# Patient Record
Sex: FEMALE | Race: WHITE | NOT HISPANIC OR LATINO | Employment: OTHER | URBAN - METROPOLITAN AREA
[De-identification: names, ages, dates, MRNs, and addresses within clinical notes are randomized per-mention and may not be internally consistent; named-entity substitution may affect disease eponyms.]

---

## 2017-01-03 ENCOUNTER — TELEPHONE (OUTPATIENT)
Dept: FAMILY MEDICINE | Facility: CLINIC | Age: 62
End: 2017-01-03

## 2017-01-03 DIAGNOSIS — G90.50 REFLEX SYMPATHETIC DYSTROPHY: Primary | ICD-10-CM

## 2017-01-03 NOTE — TELEPHONE ENCOUNTER
[facility] phone call message- patient requesting a refill:    Full Medication Name: gabapentin (NEURONTIN) 600 MG tablet    Dose: Take 2 tablets (1,200 mg) by mouth 3 times daily    Pharmacy confirmed as   [facility] [address] [place] [address] N AT [address]  [facility] [address]  Phone: [phone] Fax: [phone]  : Yes    Additional Comments: Patient is requesting refills on the medication above. States [facility] faxed this over with no follow up. She is now hoping it can be sent to the pharmacy selected above to save time. Please follow up. Thanks!     OK to leave a message on voice mail? Yes    Primary language: English      needed? No    Call taken on January 3, 2017 at 10:34 AM by Ashley Miles

## 2017-01-04 RX ORDER — GABAPENTIN 600 MG/1
1200 TABLET ORAL 3 TIMES DAILY
Qty: 540 TABLET | Refills: 3 | Status: SHIPPED | OUTPATIENT
Start: 2017-01-04 | End: 2017-01-25

## 2017-01-10 ENCOUNTER — OFFICE VISIT (OUTPATIENT)
Dept: DERMATOLOGY | Facility: CLINIC | Age: 62
End: 2017-01-10

## 2017-01-10 DIAGNOSIS — L82.1 SEBORRHEIC KERATOSIS: Primary | ICD-10-CM

## 2017-01-10 PROCEDURE — 96999 UNLISTED SPEC DERM SVC/PX: CPT | Performed by: DERMATOLOGY

## 2017-01-10 NOTE — Clinical Note
1/10/2017      RE: Hailee Mayo  [address] [address] [place] [address]       [facility] Dermatology Note      Dermatology Problem List:  none    Encounter Date: Hector 10, 2017    CC:  Chief Complaint   Patient presents with     Derm Problem     6 month skin check         History of Present Illness:  Ms. Hailee Mayo is a 61 year old female who presents as a follow up for lesion on the right temple. The patient was last seen 9/13/2016 when a SK on the right temple was identified for recheck. Today, the patient reports that the lesion on the right temple appears unchanged. The lesion is not pruritic. Has a lesion on the left thigh that she has had for many years and would like evaluated. The lesions on her back seem unchanged but she is unable to look at the lesions clearly. The patient reports no other lesions of concern.     Past Medical History:   Patient Active Problem List   Diagnosis     Sural neuritis     Saphenous neuritis     Abnormality of gait     Herpes simplex     Reflex sympathetic dystrophy of lower extremity     Insomnia     Plantar fasciitis     Menopausal syndrome (hot flashes)     Thyroid nodule     Anxiety     Hyperlipidemia with target LDL less than 130     Complex regional pain syndrome type 1 of left lower extremity     Gastroesophageal reflux disease without esophagitis     Chronic pain syndrome     Primary osteoarthritis of first carpometacarpal joint of right hand     Thumb pain, right     Thyroid function test abnormal     Past Medical History   Diagnosis Date     Sore throat      Sleep problems      Trouble swallowing      Complex regional pain syndrome      Past Surgical History   Procedure Laterality Date     ------------other-------------       Surgery to lengthen Left calf muscle     Bunionectomy       Both feet     Tonsillectomy       Lumpectomy breast bilateral       Orthopedic surgery       bunionectomy     Gyn surgery       uterine polyp removed      Laryngoscopy, esophagoscopy,  biopsy, combined  5/20/2013     Procedure: COMBINED LARYNGOSCOPY, ESOPHAGOSCOPY,  BIOPSY;  Direct Laryngoscopy , Esophagoscopy;  Surgeon: Tawanda Bryson MD;  Location: [facility]     Colonoscopy with co2 insufflation N/A 9/7/2016     Procedure: COLONOSCOPY WITH CO2 INSUFFLATION;  Surgeon: Duane, William Charles, MD;  Location: [facility] OR     Colonoscopy N/A 9/7/2016     Procedure: COMBINED COLONOSCOPY, SINGLE OR MULTIPLE BIOPSY/POLYPECTOMY BY BIOPSY;  Surgeon: Duane, William Charles, MD;  Location: [facility] OR       Social History:  The patient is retired, used to work as a . The patient denies use of tanning beds. The patient does not drink alcohol, smoke or use tobacco.     Family History:  There is no family history of skin cancer.    Medications:  Current Outpatient Prescriptions   Medication Sig Dispense Refill     gabapentin (NEURONTIN) 600 MG tablet Take 2 tablets (1,200 mg) by mouth 3 times daily 540 tablet 3     valACYclovir (VALTREX) 500 MG tablet Take 2 tablets (1,000 mg) by mouth daily 180 tablet 3     [START ON 2/12/2017] oxyCODONE (ROXICODONE) 5 MG IR tablet Take 1 tablet (5 mg) by mouth every 4 hours as needed for moderate to severe pain 150 tablet 0     zolpidem (AMBIEN) 10 MG tablet Take 1 tablet (10 mg) by mouth nightly as needed 90 tablet 1     sertraline (ZOLOFT) 50 MG tablet Take 1 tablet (50 mg) by mouth daily 90 tablet 3     simvastatin (ZOCOR) 20 MG tablet Take 1 tablet (20 mg) by mouth At Bedtime 90 tablet 3     Multiple Vitamins-Minerals (MULTIVITAMIN ADULT PO) Take by mouth daily       CALCIUM PO Take by mouth daily       Probiotic Product (PROBIOTIC DAILY PO) Take by mouth daily       ORDER FOR DME Equipment being ordered: Scooter 1 each 99       Allergies   Allergen Reactions     Penicillins      Vicodin [Hydrocodone-Acetaminophen] Other (See Comments)     Burning in abd       Review of Systems:  -Skin: As above in HPI. No additional skin  concerns.    Physical exam:  Vitals: There were no vitals taken for this visit.  GEN: This is a well developed, well-nourished female in no acute distress, in a pleasant mood.    SKIN: Focused examination of the left thigh, right temple and back was performed.  -There are waxy stuck on tan to brown papules on the back, left thigh and right temple.  -No other lesions of concern on areas examined.     Impression/Plan:  1. Seborrheic keratosis, right temple, left thigh, back    Benign nature was discussed. Discussed cryotherapy for treatment and cosmetic $150 fee.   Cryotherapy procedure note: After verbal consent and discussion of risks and benefits including but no limited to dyspigmentation/scar, blister, and pain, 10 were treated with 1-2mm freeze border for 2 cycles with liquid nitrogen. Post cryotherapy instructions were provided.     Last total skin exam 9/13/2016    Follow up in 1 year for skin exam due to number of lesions, earlier for new or changing lesions.     Staff Involved:  Scribe/Staff    Scribe Disclosure:   I, Pushpa Bradford, am serving as a scribe to document services personally performed by Dr. Enid Benavides, based on data collection and the provider's statements to me.       Provider Disclosure:   I agree with above History, Review of Systems, Physical exam and Plan. I have reviewed the content of the documentation and have edited it as needed. I have personally performed the services documented here and the documentation accurately represents those services and the decisions I have made.     Enid Benavides MD    Department of Dermatology  [facility]: Phone: [phone], Fax:[phone]  [facility]: Phone: [phone], Fax: [phone]

## 2017-01-10 NOTE — PATIENT INSTRUCTIONS
Cryotherapy    What is it?    Use of a very cold liquid, such as liquid nitrogen, to freeze and destroy abnormal skin cells that need to be removed    What should I expect?    Tenderness and redness    A small blister that might grow and fill with dark purple blood. There may be crusting.    More than one treatment may be needed if the lesions do not go away.    How do I care for the treated area?    Gently wash the area with your hands when bathing.    Use a thin layer of Vaseline to help with healing. You may use a Band-Aid.     The area should heal within 7-10 days and may leave behind a pink or lighter color.     Do not use an antibiotic or Neosporin ointment.     You may take acetaminophen (Tylenol) for pain.     Call your Doctor if you have:    Severe pain    Signs of infection (warmth, redness, cloudy yellow drainage, and or a bad smell)    Questions or concerns    Who should I call with questions?       [facility]: [phone]       [facility]: [phone]       For urgent needs outside of business hours call the [facility] at [phone]        and ask for the dermatology resident on call

## 2017-01-10 NOTE — PROGRESS NOTES
[facility] Dermatology Note      Dermatology Problem List:  none    Encounter Date: Hector 10, 2017    CC:  Chief Complaint   Patient presents with     Derm Problem     6 month skin check         History of Present Illness:  Ms. Hailee Mayo is a 61 year old female who presents as a follow up for lesion on the right temple. The patient was last seen 9/13/2016 when a SK on the right temple was identified for recheck. Today, the patient reports that the lesion on the right temple appears unchanged. The lesion is not pruritic. Has a lesion on the left thigh that she has had for many years and would like evaluated. The lesions on her back seem unchanged but she is unable to look at the lesions clearly. The patient reports no other lesions of concern.     Past Medical History:   Patient Active Problem List   Diagnosis     Sural neuritis     Saphenous neuritis     Abnormality of gait     Herpes simplex     Reflex sympathetic dystrophy of lower extremity     Insomnia     Plantar fasciitis     Menopausal syndrome (hot flashes)     Thyroid nodule     Anxiety     Hyperlipidemia with target LDL less than 130     Complex regional pain syndrome type 1 of left lower extremity     Gastroesophageal reflux disease without esophagitis     Chronic pain syndrome     Primary osteoarthritis of first carpometacarpal joint of right hand     Thumb pain, right     Thyroid function test abnormal     Past Medical History   Diagnosis Date     Sore throat      Sleep problems      Trouble swallowing      Complex regional pain syndrome      Past Surgical History   Procedure Laterality Date     ------------other-------------       Surgery to lengthen Left calf muscle     Bunionectomy       Both feet     Tonsillectomy       Lumpectomy breast bilateral       Orthopedic surgery       bunionectomy     Gyn surgery       uterine polyp removed     Laryngoscopy, esophagoscopy,  biopsy, combined  5/20/2013     Procedure: COMBINED  LARYNGOSCOPY, ESOPHAGOSCOPY,  BIOPSY;  Direct Laryngoscopy , Esophagoscopy;  Surgeon: Tawanda Bryson MD;  Location: [facility]     Colonoscopy with co2 insufflation N/A 9/7/2016     Procedure: COLONOSCOPY WITH CO2 INSUFFLATION;  Surgeon: Duane, William Charles, MD;  Location: [facility]     Colonoscopy N/A 9/7/2016     Procedure: COMBINED COLONOSCOPY, SINGLE OR MULTIPLE BIOPSY/POLYPECTOMY BY BIOPSY;  Surgeon: Duane, William Charles, MD;  Location: [facility] OR       Social History:  The patient is retired, used to work as a . The patient denies use of tanning beds. The patient does not drink alcohol, smoke or use tobacco.     Family History:  There is no family history of skin cancer.    Medications:  Current Outpatient Prescriptions   Medication Sig Dispense Refill     gabapentin (NEURONTIN) 600 MG tablet Take 2 tablets (1,200 mg) by mouth 3 times daily 540 tablet 3     valACYclovir (VALTREX) 500 MG tablet Take 2 tablets (1,000 mg) by mouth daily 180 tablet 3     [START ON 2/12/2017] oxyCODONE (ROXICODONE) 5 MG IR tablet Take 1 tablet (5 mg) by mouth every 4 hours as needed for moderate to severe pain 150 tablet 0     zolpidem (AMBIEN) 10 MG tablet Take 1 tablet (10 mg) by mouth nightly as needed 90 tablet 1     sertraline (ZOLOFT) 50 MG tablet Take 1 tablet (50 mg) by mouth daily 90 tablet 3     simvastatin (ZOCOR) 20 MG tablet Take 1 tablet (20 mg) by mouth At Bedtime 90 tablet 3     Multiple Vitamins-Minerals (MULTIVITAMIN ADULT PO) Take by mouth daily       CALCIUM PO Take by mouth daily       Probiotic Product (PROBIOTIC DAILY PO) Take by mouth daily       ORDER FOR DME Equipment being ordered: Scooter 1 each 99       Allergies   Allergen Reactions     Penicillins      Vicodin [Hydrocodone-Acetaminophen] Other (See Comments)     Burning in abd       Review of Systems:  -Skin: As above in HPI. No additional skin concerns.    Physical exam:  Vitals: There were no vitals taken for this visit.  GEN:  This is a well developed, well-nourished female in no acute distress, in a pleasant mood.    SKIN: Focused examination of the left thigh, right temple and back was performed.  -There are waxy stuck on tan to brown papules on the back, left thigh and right temple.  -No other lesions of concern on areas examined.     Impression/Plan:  1. Seborrheic keratosis, right temple, left thigh, back    Benign nature was discussed. Discussed cryotherapy for treatment and cosmetic $150 fee.   Cryotherapy procedure note: After verbal consent and discussion of risks and benefits including but no limited to dyspigmentation/scar, blister, and pain, 10 were treated with 1-2mm freeze border for 2 cycles with liquid nitrogen. Post cryotherapy instructions were provided.     Last total skin exam 9/13/2016    Follow up in 1 year for skin exam due to number of lesions, earlier for new or changing lesions.     Staff Involved:  Scribe/Staff    Scribe Disclosure:   I, Pushpa Bradford, am serving as a scribe to document services personally performed by Dr. Enid Benavides, based on data collection and the provider's statements to me.       Provider Disclosure:   I agree with above History, Review of Systems, Physical exam and Plan. I have reviewed the content of the documentation and have edited it as needed. I have personally performed the services documented here and the documentation accurately represents those services and the decisions I have made.     Enid Benavides MD    Department of Dermatology  [facility]: Phone: [phone], Fax:[phone]  [facility]: Phone: [phone], Fax: [phone]

## 2017-01-10 NOTE — MR AVS SNAPSHOT
After Visit Summary   1/10/2017    Hailee Mayo    MRN: [id number]           Patient Information     Date Of Birth          1955        Visit Information        Provider Department      1/10/2017 12:15 PM Enid Benavides MD [facility]        Today's Diagnoses     Seborrheic keratosis    -  1       Care Instructions    Cryotherapy    What is it?    Use of a very cold liquid, such as liquid nitrogen, to freeze and destroy abnormal skin cells that need to be removed    What should I expect?    Tenderness and redness    A small blister that might grow and fill with dark purple blood. There may be crusting.    More than one treatment may be needed if the lesions do not go away.    How do I care for the treated area?    Gently wash the area with your hands when bathing.    Use a thin layer of Vaseline to help with healing. You may use a Band-Aid.     The area should heal within 7-10 days and may leave behind a pink or lighter color.     Do not use an antibiotic or Neosporin ointment.     You may take acetaminophen (Tylenol) for pain.     Call your Doctor if you have:    Severe pain    Signs of infection (warmth, redness, cloudy yellow drainage, and or a bad smell)    Questions or concerns    Who should I call with questions?       [facility]: [phone]       [facility]: [phone]       For urgent needs outside of business hours call the [facility] at [phone]        and ask for the dermatology resident on call          Follow-ups after your visit        Your next 10 appointments already scheduled     May 01, 2017 11:30 AM   LAB with LAB FIRST FLOOR [facility] ([facility])    [id number] [place] [address] [phone]           Patient must bring picture ID.  Patient should be prepared to give a urine specimen  Please do not eat 10-12 hours  before your appointment if you are coming in fasting for labs on lipids, cholesterol, or glucose (sugar).  Pregnant women should follow their Care Team instructions. Water with medications is okay. Do not drink coffee or other fluids.   If you have concerns about taking  your medications, please ask at office or if scheduling via Or[employer], send a message by clicking on Secure Messaging, Message Your Care Team.            May 08, 2017 11:00 AM   [facility] Visit with Arvind Deal MD   [facility]    [address] [place] [address] [phone]            Jan 09, 2018 12:15 PM   Return Visit with Enid Benavides MD   [facility]    [address] [place] [address] [phone]              Who to contact     If you have questions or need follow up information about today's clinic visit or your schedule please contact [facility] directly at [phone].  Normal or non-critical lab and imaging results will be communicated to you by [employer], letter or phone within 4 business days after the clinic has received the results. If you do not hear from us within 7 days, please contact the clinic through [employer] or phone. If you have a critical or abnormal lab result, we will notify you by phone as soon as possible.  Submit refill requests through Ornicept or call your pharmacy and they will forward the refill request to us. Please allow 3 business days for your refill to be completed.          Additional Information About Your Visit        [employer] Information     Ornicept gives you secure access to your electronic health record. If you see a primary care provider, you can also send messages to your care team and make appointments. If you have questions, please call your primary care clinic.  If you do not have a primary care provider, please call [phone] and they will assist  you.      [employer] is an electronic gateway that provides easy, online access to your medical records. With [employer], you can request a clinic appointment, read your test results, renew a prescription or communicate with your care team.     To access your existing account, please contact your [facility] or call [phone] for assistance.        Care EveryWhere ID     This is your Care EveryWhere ID. This could be used by other organizations to access your [place] medical records  [id number]         Blood Pressure from Last 3 Encounters:   12/12/16 116/75   11/07/16 100/60   09/12/16 124/78    Weight from Last 3 Encounters:   11/07/16 78.8 kg (173 lb 11.6 oz)   09/12/16 78.563 kg (173 lb 3.2 oz)   08/30/16 77.111 kg (170 lb)              We Performed the Following     C DESTROY < 15 BENIGN SKIN LESIONS        Primary Care Provider Office Phone # Fax #    Kaleb Mcelroy [phone]       [facility] [address] [address]        Thank you!     Thank you for choosing [facility]  for your care. Our goal is always to provide you with excellent care. Hearing back from our patients is one way we can continue to improve our services. Please take a few minutes to complete the written survey that you may receive in the mail after your visit with us. Thank you!             Your Updated Medication List - Protect others around you: Learn how to safely use, store and throw away your medicines at www.disposemymeds.org.          This list is accurate as of: 1/10/17 12:46 PM.  Always use your most recent med list.                   Brand Name Dispense Instructions for use    CALCIUM PO      Take by mouth daily       gabapentin 600 MG tablet    NEURONTIN    540 tablet    Take 2 tablets (1,200 mg) by mouth 3 times daily       MULTIVITAMIN ADULT PO      Take by mouth daily       order for DME     1 each    Equipment being ordered:  Rodriguez       oxyCODONE 5 MG IR tablet   Start taking on:  2/12/2017    ROXICODONE    150 tablet    Take 1 tablet (5 mg) by mouth every 4 hours as needed for moderate to severe pain       PROBIOTIC DAILY PO      Take by mouth daily       sertraline 50 MG tablet    ZOLOFT    90 tablet    Take 1 tablet (50 mg) by mouth daily       simvastatin 20 MG tablet    ZOCOR    90 tablet    Take 1 tablet (20 mg) by mouth At Bedtime       valACYclovir 500 MG tablet    VALTREX    180 tablet    Take 2 tablets (1,000 mg) by mouth daily       zolpidem 10 MG tablet    AMBIEN    90 tablet    Take 1 tablet (10 mg) by mouth nightly as needed

## 2017-01-25 DIAGNOSIS — G90.50 REFLEX SYMPATHETIC DYSTROPHY: Primary | ICD-10-CM

## 2017-01-25 RX ORDER — GABAPENTIN 600 MG/1
1200 TABLET ORAL 3 TIMES DAILY
Qty: 540 TABLET | Refills: 3 | Status: SHIPPED | OUTPATIENT
Start: 2017-01-25 | End: 2017-10-10

## 2017-01-25 NOTE — TELEPHONE ENCOUNTER
[facility] phone call message- medication clarification/question:    Full Medication Name: Jah       Refill request. Last seen 12/12/16. Last fill 1/4/2017    Pharmacy confirmed as    [id number] - [address] [place] AT [facility]  [facility] - [address]: No    OK to leave a message on voice mail? Yes    Primary language: English      needed? No    Call taken on January 25, 2017 at 1:58 PM by Catherine Rivera RN

## 2017-01-25 NOTE — TELEPHONE ENCOUNTER
[facility] phone call message- patient requesting a refill:    Full Medication Name: gabapentin (NEURONTIN) 600 MG tablet      Pharmacy confirmed as     [facility] - [address]  [address]  [facility] [address]  Phone: [phone] Fax: [phone]  : Yes    Additional Comments: Patient stated her pharmacy wanted her to get this filled through [facility] as it is cheaper.      OK to leave a message on voice mail? Yes    Primary language: English      needed? No    Call taken on January 25, 2017 at 1:51 PM by Leti Clark

## 2017-02-27 ENCOUNTER — TELEPHONE (OUTPATIENT)
Dept: FAMILY MEDICINE | Facility: CLINIC | Age: 62
End: 2017-02-27

## 2017-02-27 ENCOUNTER — OFFICE VISIT (OUTPATIENT)
Dept: FAMILY MEDICINE | Facility: CLINIC | Age: 62
End: 2017-02-27

## 2017-02-27 VITALS
WEIGHT: 179 LBS | HEART RATE: 63 BPM | OXYGEN SATURATION: 98 % | SYSTOLIC BLOOD PRESSURE: 127 MMHG | TEMPERATURE: 97.7 F | BODY MASS INDEX: 30.73 KG/M2 | RESPIRATION RATE: 18 BRPM | DIASTOLIC BLOOD PRESSURE: 83 MMHG

## 2017-02-27 DIAGNOSIS — G47.00 INSOMNIA, UNSPECIFIED TYPE: ICD-10-CM

## 2017-02-27 DIAGNOSIS — G90.522 COMPLEX REGIONAL PAIN SYNDROME TYPE 1 OF LEFT LOWER EXTREMITY: ICD-10-CM

## 2017-02-27 DIAGNOSIS — G89.4 CHRONIC PAIN SYNDROME: Primary | ICD-10-CM

## 2017-02-27 LAB
AMPHETAMINES QUAL: NEGATIVE
BARBITURATES QUAL URINE: NEGATIVE
BENZODIAZEPINE QUAL URINE: NEGATIVE
BUPRENORPHINE QUAL URINE: NEGATIVE
CANNABINOIDS UR QL SCN: NEGATIVE
COCAINE QUAL URINE: NEGATIVE
METHAMPHETAMINE: NEGATIVE
METHODONE QUAL: NEGATIVE
MORPHINE QUAL: NEGATIVE
OXYCODONE QUAL: NEGATIVE
TEMPERATURE OF URINE WAS BETWEEN 90-100 DEGREES F: YES

## 2017-02-27 RX ORDER — ZOLPIDEM TARTRATE 10 MG/1
10 TABLET ORAL
Qty: 90 TABLET | Refills: 1 | Status: SHIPPED | OUTPATIENT
Start: 2017-02-27 | End: 2017-04-26

## 2017-02-27 RX ORDER — OXYCODONE HYDROCHLORIDE 5 MG/1
5 TABLET ORAL EVERY 4 HOURS PRN
Qty: 150 TABLET | Refills: 0 | Status: SHIPPED | OUTPATIENT
Start: 2017-03-27 | End: 2017-02-27

## 2017-02-27 RX ORDER — OXYCODONE HYDROCHLORIDE 5 MG/1
5 TABLET ORAL EVERY 4 HOURS PRN
Qty: 150 TABLET | Refills: 0 | Status: SHIPPED | OUTPATIENT
Start: 2017-04-27 | End: 2017-05-22

## 2017-02-27 RX ORDER — OXYCODONE HYDROCHLORIDE 5 MG/1
5 TABLET ORAL EVERY 4 HOURS PRN
Qty: 150 TABLET | Refills: 0 | Status: SHIPPED | OUTPATIENT
Start: 2017-02-27 | End: 2017-02-27

## 2017-02-27 NOTE — PROGRESS NOTES
HPI  61 year old female here for evaluation of several issues.      1. RSD  Some tingling in R foot now.  Would like more oxy but knows that will not happen  Difficult on days she does water aerobics    2. Chronic Pain  UTox - no oxy seen  FAQ5 - 55 (previous 60)  [facility] database - expected  Consent - done today  BH - referral done today  Exercise - water aerobics twice a week    3. Refill ambien    4. Depression / anxiety  Stable with zoloft        ROS  6 point ROS neg other than the symptoms noted above in the HPI.    MEDS  Current Outpatient Prescriptions   Medication     gabapentin (NEURONTIN) 600 MG tablet     valACYclovir (VALTREX) 500 MG tablet     oxyCODONE (ROXICODONE) 5 MG IR tablet     zolpidem (AMBIEN) 10 MG tablet     sertraline (ZOLOFT) 50 MG tablet     simvastatin (ZOCOR) 20 MG tablet     Multiple Vitamins-Minerals (MULTIVITAMIN ADULT PO)     CALCIUM PO     Probiotic Product (PROBIOTIC DAILY PO)     ORDER FOR DME     No current facility-administered medications for this visit.          SOC      FHx  Family History   Problem Relation Age of Onset     CEREBROVASCULAR DISEASE Mother      DIABETES Father      DIABETES Maternal Grandfather      C.A.D. Maternal Grandfather      Myocardial Infarction Maternal Grandfather      Alcohol/Drug Maternal Grandfather      CEREBROVASCULAR DISEASE Paternal Grandmother        PHYSICAL EXAMINATION:  Vital signs: /83 (BP Location: Left arm, Patient Position: Chair, Cuff Size: Adult Regular)  Pulse 63  Temp 97.7  F (36.5  C) (Oral)  Resp 18  Wt 179 lb (81.2 kg)  SpO2 98%  Breastfeeding? No  BMI 30.73 kg/m2    GENERAL:: healthy, alert and no distress  PSYCH: Alert and oriented times 3; speech- coherent , normal rate and volume; able to articulate logical thoughts, able to abstract reason, no tangential thoughts, no hallucinations or delusions, affect- normal      LABS  Results for orders placed or performed in visit on 02/27/17 (from the past 24 hour(s))   Rapid Urine  "Drug Screen ([facility])   Result Value Ref Range    Amphetamines Qual NEGATIVE NEGATIVE    Barbiturates Qual Urine NEGATIVE NEGATIVE    Buprenorphine Qual Urine NEGATIVE NEGATIVE    Benzodiazepine Qual Urine NEGATIVE NEGATIVE    Cocaine Qual Urine NEGATIVE NEGATIVE    Cannabinoids Qual Urine NEGATIVE NEGATIVE    Methamphetamine Qual NEGATIVE NEGATIVE    Methadone Qual NEGATIVE NEGATIVE    Morphine Qual NEGATIVE NEGATIVE    Oxycodone Qual NEGATIVE NEGATIVE    Temperature of Urine was Between  Degrees F YES YES         ASSESSMENT/PLAN    1. Chronic pain syndrome  2. Complex regional pain syndrome type 1 of left lower extremity  Reasonably stable  Offered referral to pain med for any \"new\" methods - she will consider    Behavioral health consult and care plan  Consent given  FAQ = 55    - oxyCODONE (ROXICODONE) 5 MG IR tablet; Take 1 tablet (5 mg) by mouth every 4 hours as needed for moderate to severe pain  Dispense: 150 tablet; Refill: 0  - BEHAVIORAL HEALTH REFERRAL ([facility] and external)  - Rapid Urine Drug Screen ([facility])        3. Insomnia, unspecified type  - zolpidem (AMBIEN) 10 MG tablet; Take 1 tablet (10 mg) by mouth nightly as needed  Dispense: 90 tablet; Refill: 1    4. RTC 3 months      JANA DRIVER    "

## 2017-02-27 NOTE — TELEPHONE ENCOUNTER
Mental Health Referral:  Please schedule with Dr. Larios or Jessica Hoff [facility] for chronic pain evaluation    If you are unable to reach the patient after two phone attempts, please send a letter and close the encounter.      Thank you!

## 2017-02-27 NOTE — MR AVS SNAPSHOT
After Visit Summary   2/27/2017    Hailee Mayo    MRN: [id number]           Patient Information     Date Of Birth          1955        Visit Information        Provider Department      2/27/2017 11:00 AM Kaleb Mcelroy MD [facility]        Today's Diagnoses     Complex regional pain syndrome type 1 of left lower extremity    -  1    Chronic pain syndrome        Insomnia, unspecified type          Care Instructions    Read this consent/information about opiates        Hailee Mayo  [id number]         February 27, 2017  Informed  Consent  and  Agreement  for  Opioid  Therapy  of  Pain        Reason  for  Review  and  Signing  of  this  Document       Pain  relief  is  an  important  goal  for  your  care.  Opioid  medications  may  be  a  helpful  part  of   chronic  pain  treatment  for  some  people;  however,  misuse  of  opioid  medications  may result  in   serious  harm  to  patients  prescribed  them  and,  when  the  medications  are  diverted,  to  the  public  at   large.  As  opioid  use  for  pain  management  has  increased  in  recent  years,  injury,  addiction,  and   death  due  to  misuse  of  opioids  have  also  increased.    Patients  and  health  care  providers  both  have  responsibilities  for  the  safe  use  of  opioid   medications  when  they  are  prescribed  for  pain.  This  agreement  provides  important information   on  the  potential  benefits  and  risks  of  opioid  medications  and shows that both  you and  your  provider  agree  on  a  care  plan  so  that  opioid  medications  are  used  in  a  way  that  is  safe   and  effective  in  treating  your  pain.    This  agreement  is  reviewed  and  signed  by  all  patients  in  our   practice  who  receive  opioids  for  chronic  pain.          Expected  Benefits  or  Goals  of  Opioid  Treatment     Improved  pain      Improved  ability  to  engage  in  work,  social,  recreational   and/or  physical  activities      Improved  quality  of  life            Potential Risks or Side Effects of Opioid Treatment    Overdose Taking more than the prescribed amount of medication or using with alcohol or other drugs can cause you to stop breathing, resulting in coma, brain damage, or even death. Every single day, more than 40 Americans die from prescription opioid overdoses. The Center for Disease Control and Prevention (CDC) has declared that the [place] is in the middle of an epidemic opioid overdose deaths.      Physical side effects May include mood changes, drowsiness, nausea, constipation, urination difficulties, depressed breathing, itching, bone thinning and sexual difficulties, such as lowering male hormone in men and stopping menstrual periods in women.      Physical dependence Suddenly stopping an opioid may lead to withdrawal symptoms including abdominal cramping, pain, diarrhea, sweating, anxiety, irritability and aching.     Tolerance A dose of an opioid may become less effective over time even though there is no change in your physical condition. If this happens repeatedly, your medication may need to be changed or discontinued.      Addiction Is more common in people with personal or family history of addiction, but can occur in anyone. It is suggested by drug craving, loss of control and may lead to money, relationship, or legal problems.     Hyperalgesia The use of opioids can increase the experience of pain. If this happens, it may require change or discontinuation of medication.      Sleep apnea  (periods of not breathing while asleep) may be caused or worsened by opioids.   Risk to unborn child Risks to unborn children may include: physical dependence at birth, possible alterations in pain perception, possible increased risk for addiction later in life, among others. Tell your provider if you are or intend to become pregnant.       Victimization There is a risk that you or someone in  your household may be the victim of theft, deceit, assault or abuse by people trying to take your medications to misuse them.          Responsibilities  in  Opioid  Therapy  of  Chronic  Pain      Your provider's responsibilities: listening carefully to your concerns, treating you with  care and with due respect, and making clinical decisions based on what he/she believes is in  your best interest.    Your responsibilities: In order to maximize the potential benefits of opioid medications and to  minimize the potential risks, it is important that you accept the following responsibilities. In  signing this agreement, you agree to:    1. Use your opioid medications as prescribed for the purpose of relieving pain  2. Keep your medications locked up to avoid intentional or unintentional use or diversion  by others. Discard all unused medications.  3. Be honest with your providers about your medication or other drug use.  4. Use no illegal drugs and not abuse alcohol while being prescribed opioids.  5. Not to share, sell, trade or in any way provide your medications to others.  6. Receive opioid medications from this practice only. If opioids are prescribed  unexpectedly by another office (for example due to an accident or dental procedure),  inform this office within 24 hours.  7. Fill your opioid medications at one pharmacy only. Inform this practice within 24 hours  if you must use a pharmacy different from your usual one.  8. Have urine drugs tests on a random basis and as requested by your provider. (Opioid  may be discontinued if the tests are declined illicit drugs found or medication not present when should be.)  9. Bring your opioid medications to the practice when requested.  10. Participate in other pain treatments agreed to with your provider and keep all  appointments scheduled for your care.  11. Permit this practice to communicate with other care providers and/or your significant  others as needed to  assure opioids are being used appropriately and are beneficial to  your health and well-being  12.  I understand that my clinic can track controlled substance prescriptions from other providers through a central database (prescription monitoring program).  13.   I will tell my clinic right away if I become pregnant or have a new medical problem treated at another clinic    Your medications may be continued if they improve your pain, help you engage in valued  activities, and/or enhance your quality of life and if you follow the above responsibilities.  Your medications may be discontinued if your goals for treatment are not met, if you experience negative  effects from using them, or if you do not follow this agreement.  If you develop complications of opioid use, such as addiction, we will assist you in finding  treatment. Please be aware, however, that our practice cooperates fully with law enforcement,  the US Drug Enforcement Agency and other agencies in the investigation of opioid-related  crimes including sharing, selling, trading or other potential harmful use of these powerful  Medications.    I have reviewed this document and been given the opportunity to have any questions  answered. I understand the possible benefits and risks of opioid medications and I accept the  responsibilities described above.    __________________________________         ____________________________________  Patient     Date   Kaleb Escamilla MD                     Date        Follow-ups after your visit        Additional Services     BEHAVIORAL HEALTH REFERRAL ([facility] interal and external)       Referring MD: KALEB ESCAMILLA    May leave message on voicemail: Yes  PHQ-9 Score:   GAD7 Score:                  Your next 10 appointments already scheduled     May 01, 2017 11:30 AM CDT   LAB with LAB FIRST FLOOR [facility] ([facility])    [address] [address] [place] [address]    [phone]           Patient must bring picture ID.  Patient should be prepared to give a urine specimen  Please do not eat 10-12 hours before your appointment if you are coming in fasting for labs on lipids, cholesterol, or glucose (sugar).  Pregnant women should follow their Care Team instructions. Water with medications is okay. Do not drink coffee or other fluids.   If you have concerns about taking  your medications, please ask at office or if scheduling via [employer], send a message by clicking on Secure Messaging, Message Your Care Team.            May 08, 2017 11:00 AM CDT   Return Visit with Arvind Deal MD   [facility]    [address] [place] [address] [phone]            Jan 09, 2018 12:15 PM CST   Return Visit with Enid Benavides MD   [facility] ([facility])    [address] [place] [address] [phone]              Who to contact     Please call your clinic at [phone] to:    Ask questions about your health    Make or cancel appointments    Discuss your medicines    Learn about your test results    Speak to your doctor   If you have compliments or concerns about an experience at your clinic, or if you wish to file a complaint, please contact [facility] Patient Relations at [phone] or email us at [email or web address]         Additional Information About Your Visit        [employer] Information     [employer] gives you secure access to your electronic health record. If you see a primary care provider, you can also send messages to your care team and make appointments. If you have questions, please call your primary care clinic.  If you do not have a primary care provider, please call [phone] and they will assist you.      [employer] is an electronic gateway that provides easy, online access to your medical records. With [employer],  you can request a clinic appointment, read your test results, renew a prescription or communicate with your care team.     To access your existing account, please contact your [facility] or call [phone] for assistance.        Care EveryWhere ID     This is your Care EveryWhere ID. This could be used by other organizations to access your [facility] medical records  [id number]        Your Vitals Were     Pulse Temperature Respirations Pulse Oximetry Breastfeeding? BMI (Body Mass Index)    63 97.7  F (36.5  C) (Oral) 18 98% No 30.73 kg/m2       Blood Pressure from Last 3 Encounters:   02/27/17 127/83   12/12/16 116/75   11/07/16 100/60    Weight from Last 3 Encounters:   02/27/17 179 lb (81.2 kg)   11/07/16 173 lb 11.6 oz (78.8 kg)   09/12/16 173 lb 3.2 oz (78.6 kg)              We Performed the Following     BEHAVIORAL HEALTH REFERRAL ([facility] interal and external)     Rapid Urine Drug Screen ([facility])          Today's Medication Changes          These changes are accurate as of: 2/27/17 11:30 AM.  If you have any questions, ask your nurse or doctor.               Start taking these medicines.        Dose/Directions    oxyCODONE 5 MG IR tablet   Commonly known as:  ROXICODONE   Used for:  Chronic pain syndrome   Started by:  Kaleb Mcelroy MD        Dose:  5 mg   Start taking on:  4/27/2017   Take 1 tablet (5 mg) by mouth every 4 hours as needed for moderate to severe pain   Quantity:  150 tablet   Refills:  0            Where to get your medicines      Some of these will need a paper prescription and others can be bought over the counter.  Ask your nurse if you have questions.     Bring a paper prescription for each of these medications     oxyCODONE 5 MG IR tablet    zolpidem 10 MG tablet                Primary Care Provider Office Phone # Fax #    Kaleb Mcelroy [phone]       [facility] [address] [address]        Thank  you!     Thank you for choosing [facility]  for your care. Our goal is always to provide you with excellent care. Hearing back from our patients is one way we can continue to improve our services. Please take a few minutes to complete the written survey that you may receive in the mail after your visit with us. Thank you!             Your Updated Medication List - Protect others around you: Learn how to safely use, store and throw away your medicines at www.disposemymeds.org.          This list is accurate as of: 2/27/17 11:30 AM.  Always use your most recent med list.                   Brand Name Dispense Instructions for use    CALCIUM PO      Take by mouth daily       gabapentin 600 MG tablet    NEURONTIN    540 tablet    Take 2 tablets (1,200 mg) by mouth 3 times daily       MULTIVITAMIN ADULT PO      Take by mouth daily       order for DME     1 each    Equipment being ordered: Scooter       oxyCODONE 5 MG IR tablet   Start taking on:  4/27/2017    ROXICODONE    150 tablet    Take 1 tablet (5 mg) by mouth every 4 hours as needed for moderate to severe pain       PROBIOTIC DAILY PO      Take by mouth daily       sertraline 50 MG tablet    ZOLOFT    90 tablet    Take 1 tablet (50 mg) by mouth daily       simvastatin 20 MG tablet    ZOCOR    90 tablet    Take 1 tablet (20 mg) by mouth At Bedtime       valACYclovir 500 MG tablet    VALTREX    180 tablet    Take 2 tablets (1,000 mg) by mouth daily       zolpidem 10 MG tablet    AMBIEN    90 tablet    Take 1 tablet (10 mg) by mouth nightly as needed

## 2017-02-27 NOTE — PATIENT INSTRUCTIONS
Read this consent/information about opiates        Hailee Mayo  [id number]         February 27, 2017  Informed  Consent  and  Agreement  for  Opioid  Therapy  of  Pain        Reason  for  Review  and  Signing  of  this  Document       Pain  relief  is  an  important  goal  for  your  care.  Opioid  medications  may  be  a  helpful  part  of   chronic  pain  treatment  for  some  people;  however,  misuse  of  opioid  medications  may result  in   serious  harm  to  patients  prescribed  them  and,  when  the  medications  are  diverted,  to  the  public  at   large.  As  opioid  use  for  pain  management  has  increased  in  recent  years,  injury,  addiction,  and   death  due  to  misuse  of  opioids  have  also  increased.    Patients  and  health  care  providers  both  have  responsibilities  for  the  safe  use  of  opioid   medications  when  they  are  prescribed  for  pain.  This  agreement  provides  important information   on  the  potential  benefits  and  risks  of  opioid  medications  and shows that both  you and  your  provider  agree  on  a  care  plan  so  that  opioid  medications  are  used  in  a  way  that  is  safe   and  effective  in  treating  your  pain.    This  agreement  is  reviewed  and  signed  by  all  patients  in  our   practice  who  receive  opioids  for  chronic  pain.          Expected  Benefits  or  Goals  of  Opioid  Treatment     Improved  pain      Improved  ability  to  engage  in  work,  social,  recreational  and/or  physical  activities      Improved  quality  of  life            Potential Risks or Side Effects of Opioid Treatment    Overdose Taking more than the prescribed amount of medication or using with alcohol or other drugs can cause you to stop breathing, resulting in coma, brain damage, or even death. Every single day, more than 40 Americans die from prescription opioid overdoses. The Center for Disease Control and Prevention (CDC) has declared that the  [place] is in the middle of an epidemic opioid overdose deaths.      Physical side effects May include mood changes, drowsiness, nausea, constipation, urination difficulties, depressed breathing, itching, bone thinning and sexual difficulties, such as lowering male hormone in men and stopping menstrual periods in women.      Physical dependence Suddenly stopping an opioid may lead to withdrawal symptoms including abdominal cramping, pain, diarrhea, sweating, anxiety, irritability and aching.     Tolerance A dose of an opioid may become less effective over time even though there is no change in your physical condition. If this happens repeatedly, your medication may need to be changed or discontinued.      Addiction Is more common in people with personal or family history of addiction, but can occur in anyone. It is suggested by drug craving, loss of control and may lead to money, relationship, or legal problems.     Hyperalgesia The use of opioids can increase the experience of pain. If this happens, it may require change or discontinuation of medication.      Sleep apnea  (periods of not breathing while asleep) may be caused or worsened by opioids.   Risk to unborn child Risks to unborn children may include: physical dependence at birth, possible alterations in pain perception, possible increased risk for addiction later in life, among others. Tell your provider if you are or intend to become pregnant.       Victimization There is a risk that you or someone in your household may be the victim of theft, deceit, assault or abuse by people trying to take your medications to misuse them.          Responsibilities  in  Opioid  Therapy  of  Chronic  Pain      Your provider's responsibilities: listening carefully to your concerns, treating you with  care and with due respect, and making clinical decisions based on what he/she believes is in  your best interest.    Your responsibilities: In order to maximize the potential  benefits of opioid medications and to  minimize the potential risks, it is important that you accept the following responsibilities. In  signing this agreement, you agree to:    1. Use your opioid medications as prescribed for the purpose of relieving pain  2. Keep your medications locked up to avoid intentional or unintentional use or diversion  by others. Discard all unused medications.  3. Be honest with your providers about your medication or other drug use.  4. Use no illegal drugs and not abuse alcohol while being prescribed opioids.  5. Not to share, sell, trade or in any way provide your medications to others.  6. Receive opioid medications from this practice only. If opioids are prescribed  unexpectedly by another office (for example due to an accident or dental procedure),  inform this office within 24 hours.  7. Fill your opioid medications at one pharmacy only. Inform this practice within 24 hours  if you must use a pharmacy different from your usual one.  8. Have urine drugs tests on a random basis and as requested by your provider. (Opioid  may be discontinued if the tests are declined illicit drugs found or medication not present when should be.)  9. Bring your opioid medications to the practice when requested.  10. Participate in other pain treatments agreed to with your provider and keep all  appointments scheduled for your care.  11. Permit this practice to communicate with other care providers and/or your significant  others as needed to assure opioids are being used appropriately and are beneficial to  your health and well-being  12.  I understand that my clinic can track controlled substance prescriptions from other providers through a central database (prescription monitoring program).  13.   I will tell my clinic right away if I become pregnant or have a new medical problem treated at another clinic    Your medications may be continued if they improve your pain, help you engage in  valued  activities, and/or enhance your quality of life and if you follow the above responsibilities.  Your medications may be discontinued if your goals for treatment are not met, if you experience negative  effects from using them, or if you do not follow this agreement.  If you develop complications of opioid use, such as addiction, we will assist you in finding  treatment. Please be aware, however, that our practice cooperates fully with law enforcement,  the US Drug Enforcement Agency and other agencies in the investigation of opioid-related  crimes including sharing, selling, trading or other potential harmful use of these powerful  Medications.    I have reviewed this document and been given the opportunity to have any questions  answered. I understand the possible benefits and risks of opioid medications and I accept the  responsibilities described above.    __________________________________         ____________________________________  Patient     Date   Kaleb Mcelroy MD                     Date

## 2017-04-26 DIAGNOSIS — G47.00 INSOMNIA, UNSPECIFIED TYPE: ICD-10-CM

## 2017-04-26 NOTE — TELEPHONE ENCOUNTER
Request for medication refill:    Date of last visit at clinic: 2/27/17    Please complete refill if appropriate and CLOSE ENCOUNTER.    Closing the encounter signifies the refill is complete.    If refill has been denied, please complete the smart phrase .smirefuse and route it to the [facility] RN TRIAGE pool to inform the patient and the pharmacy.    Sharron Rodríguez, CMA

## 2017-04-27 RX ORDER — ZOLPIDEM TARTRATE 10 MG/1
10 TABLET ORAL
Qty: 90 TABLET | Refills: 1 | Status: SHIPPED | OUTPATIENT
Start: 2017-04-27 | End: 2017-09-05

## 2017-05-08 ENCOUNTER — OFFICE VISIT (OUTPATIENT)
Dept: ENDOCRINOLOGY | Facility: CLINIC | Age: 62
End: 2017-05-08
Payer: MEDICARE

## 2017-05-08 ENCOUNTER — TELEPHONE (OUTPATIENT)
Dept: ENDOCRINOLOGY | Facility: CLINIC | Age: 62
End: 2017-05-08

## 2017-05-08 VITALS
HEART RATE: 76 BPM | DIASTOLIC BLOOD PRESSURE: 70 MMHG | WEIGHT: 180.38 LBS | SYSTOLIC BLOOD PRESSURE: 111 MMHG | HEIGHT: 64 IN | BODY MASS INDEX: 30.8 KG/M2

## 2017-05-08 DIAGNOSIS — R94.6 THYROID FUNCTION TEST ABNORMAL: ICD-10-CM

## 2017-05-08 DIAGNOSIS — E04.2 MULTIPLE THYROID NODULES: ICD-10-CM

## 2017-05-08 DIAGNOSIS — R94.6 ABNORMAL FINDING ON THYROID FUNCTION TEST: Primary | ICD-10-CM

## 2017-05-08 LAB
T4 FREE SERPL-MCNC: 0.72 NG/DL (ref 0.76–1.46)
T4 SERPL-MCNC: 7.3 UG/DL (ref 4.5–13.9)
TSH SERPL DL<=0.05 MIU/L-ACNC: 1.81 MU/L (ref 0.4–4)

## 2017-05-08 PROCEDURE — 99000 SPECIMEN HANDLING OFFICE-LAB: CPT | Performed by: INTERNAL MEDICINE

## 2017-05-08 PROCEDURE — 84443 ASSAY THYROID STIM HORMONE: CPT | Performed by: INTERNAL MEDICINE

## 2017-05-08 PROCEDURE — 84439 ASSAY OF FREE THYROXINE: CPT | Performed by: INTERNAL MEDICINE

## 2017-05-08 PROCEDURE — 36415 COLL VENOUS BLD VENIPUNCTURE: CPT | Performed by: INTERNAL MEDICINE

## 2017-05-08 PROCEDURE — 84436 ASSAY OF TOTAL THYROXINE: CPT | Performed by: INTERNAL MEDICINE

## 2017-05-08 PROCEDURE — 99214 OFFICE O/P EST MOD 30 MIN: CPT | Performed by: INTERNAL MEDICINE

## 2017-05-08 NOTE — MR AVS SNAPSHOT
After Visit Summary   5/8/2017    Hailee Mayo    MRN: [id number]           Patient Information     Date Of Birth          1955        Visit Information        Provider Department      5/8/2017 11:00 AM Arvind Deal MD [facility]        Today's Diagnoses     Abnormal finding on thyroid function test    -  1    Multiple thyroid nodules          Care Instructions    Thank you for choosing the [facility] Department of Endocrinology. It has been a pleasure servicing you today. Please contact us at [phone] with any questions. If you need to speak to an Endocrinologist and it is after 5:00 pm or on a weekend, please call the On-Call Endocrinologist at [phone].          Follow-ups after your visit        Your next 10 appointments already scheduled     May 22, 2017 11:00 AM CDT   Return Visit with Kaleb Mcelroy MD   [facility])    [facility] [address],  [address] [place], [place] [id number] [phone]            Oct 04, 2017 10:00 AM CDT   US THYROID with MGUSAngela MG  [employer]   [facility] ([facility])    [address] [address] [place] [id number] [phone]           Please bring a list of your medicines (including vitamins, minerals and over-the-counter drugs). Also, tell your doctor about any allergies you may have. Wear comfortable clothes and leave your valuables at home.  You do not need to do anything special to prepare for your exam.  Please call the Imaging Department at your exam site with any questions.            Jan 09, 2018 12:15 PM CST   Return Visit with Enid Benavides MD   [facility] ([facility])    [address] [place] [id number] [phone]              Future tests that were ordered for you today     Open Future Orders        Priority Expected Expires Ordered    US Thyroid Routine 10/9/2017  "12/4/2017 5/8/2017    T4 free Routine  5/8/2018 5/8/2017    TSH Routine  5/8/2018 5/8/2017    Thyroxine total Routine  5/8/2018 5/8/2017            Who to contact     If you have questions or need follow up information about today's clinic visit or your schedule please contact [facility] directly at [phone].  Normal or non-critical lab and imaging results will be communicated to you by [employer]MineOnlinehart, letter or phone within 4 business days after the clinic has received the results. If you do not hear from us within 7 days, please contact the clinic through [employer] or phone. If you have a critical or abnormal lab result, we will notify you by phone as soon as possible.  Submit refill requests through [employer] or call your pharmacy and they will forward the refill request to us. Please allow 3 business days for your refill to be completed.          Additional Information About Your Visit        [employer] Information     [employer] gives you secure access to your electronic health record. If you see a primary care provider, you can also send messages to your care team and make appointments. If you have questions, please call your primary care clinic.  If you do not have a primary care provider, please call [phone] and they will assist you.      [employer] is an electronic gateway that provides easy, online access to your medical records. With [employer], you can request a clinic appointment, read your test results, renew a prescription or communicate with your care team.     To access your existing account, please contact your [facility] or call [phone] for assistance.        Care EveryWhere ID     This is your Care EveryWhere ID. This could be used by other organizations to access your [place] medical records  [id number]        Your Vitals Were     Pulse Height BMI (Body Mass Index)             76 1.626 m (5' 4\") 30.96 kg/m2          Blood Pressure from Last 3 " Encounters:   05/08/17 111/70   02/27/17 127/83   12/12/16 116/75    Weight from Last 3 Encounters:   05/08/17 81.8 kg (180 lb 6 oz)   02/27/17 81.2 kg (179 lb)   11/07/16 78.8 kg (173 lb 11.6 oz)               Primary Care Provider Office Phone # Fax #    Kaleb Mcelroy [phone]       [facility] [address] [address]        Thank you!     Thank you for choosing [facility]  for your care. Our goal is always to provide you with excellent care. Hearing back from our patients is one way we can continue to improve our services. Please take a few minutes to complete the written survey that you may receive in the mail after your visit with us. Thank you!             Your Updated Medication List - Protect others around you: Learn how to safely use, store and throw away your medicines at www.disposemymeds.org.          This list is accurate as of: 5/8/17 11:39 AM.  Always use your most recent med list.                   Brand Name Dispense Instructions for use    gabapentin 600 MG tablet    NEURONTIN    540 tablet    Take 2 tablets (1,200 mg) by mouth 3 times daily       MULTIVITAMIN ADULT PO      Take by mouth daily       order for DME     1 each    Equipment being ordered: Scooter       oxyCODONE 5 MG IR tablet    ROXICODONE    150 tablet    Take 1 tablet (5 mg) by mouth every 4 hours as needed for moderate to severe pain       PROBIOTIC DAILY PO      Take by mouth daily       sertraline 50 MG tablet    ZOLOFT    90 tablet    Take 1 tablet (50 mg) by mouth daily       simvastatin 20 MG tablet    ZOCOR    90 tablet    Take 1 tablet (20 mg) by mouth At Bedtime       valACYclovir 500 MG tablet    VALTREX    180 tablet    Take 2 tablets (1,000 mg) by mouth daily       zolpidem 10 MG tablet    AMBIEN    90 tablet    Take 1 tablet (10 mg) by mouth nightly as needed

## 2017-05-08 NOTE — PATIENT INSTRUCTIONS
Thank you for choosing the [facility], Department of Endocrinology. It has been a pleasure servicing you today. Please contact us at [phone] with any questions. If you need to speak to an Endocrinologist and it is after 5:00 pm or on a weekend, please call the On-Call Endocrinologist at [phone].

## 2017-05-08 NOTE — PROGRESS NOTES
Endocrinology Note         Hailee is a 61 year old female presents today for multiple thyroid nodules.    HPI  Hailee Mayo is a 61 years old female with hx of multiple thyroid nodule, complex regional pain syndrome who is here for follow up multiple thyroid nodule and mild abnormality of thyroid function test. She was previously seen Dr.Shalamar Broussard. Last seen 11/7/2016.    She was noted to have multiple thyroid nodules since 2013 when she experienced something stuck in her throat and left sided sore throat that occurred intermittently. She did have FNA of the right sided nodule that was benign in 2013. She had serial thyroid ultrasound over the past year. The last US thyroid in 10/2016 showed multiple thyroid nodules with the dominant nodule in the right lobe described as well-defined hypoechoic nodule in the inferior right thyroid lobe measuring 1.2 x 1 x 0.9 cm. The remaining nodules were subcentimeter in size.    She continues to have intermittent left sided sore throat and food stuck in her throat. However, she did not have choking or difficulty swallowing or breathing. She has low energy, easily sweating and hot intolerance. Her recent 2 sets of TFT showed mild low FT4 and normal TSH. Lab on 12/12/16 showed TSH 1.92, FT4 0.74 (0.76-1.46). She has had complex regional pain syndrome affected left foot and leg.    Past Medical History  Past Medical History:   Diagnosis Date     Complex regional pain syndrome      Sleep problems      Sore throat      Trouble swallowing        Allergies  Allergies   Allergen Reactions     Penicillins      Vicodin [Hydrocodone-Acetaminophen] Other (See Comments)     Burning in abd     Medications  Current Outpatient Prescriptions   Medication Sig Dispense Refill     zolpidem (AMBIEN) 10 MG tablet Take 1 tablet (10 mg) by mouth nightly as needed 90 tablet 1     oxyCODONE (ROXICODONE) 5 MG IR tablet Take 1 tablet (5 mg) by mouth every 4 hours as needed for moderate to severe  "pain 150 tablet 0     gabapentin (NEURONTIN) 600 MG tablet Take 2 tablets (1,200 mg) by mouth 3 times daily 540 tablet 3     valACYclovir (VALTREX) 500 MG tablet Take 2 tablets (1,000 mg) by mouth daily 180 tablet 3     sertraline (ZOLOFT) 50 MG tablet Take 1 tablet (50 mg) by mouth daily 90 tablet 3     simvastatin (ZOCOR) 20 MG tablet Take 1 tablet (20 mg) by mouth At Bedtime 90 tablet 3     Multiple Vitamins-Minerals (MULTIVITAMIN ADULT PO) Take by mouth daily       Probiotic Product (PROBIOTIC DAILY PO) Take by mouth daily       ORDER FOR DME Equipment being ordered: Scooter 1 each 99     Family History  family history includes Alcohol/Drug in her maternal grandfather; C.A.D. in her maternal grandfather; CEREBROVASCULAR DISEASE in her mother and paternal grandmother; DIABETES in her father and maternal grandfather; Myocardial Infarction in her maternal grandfather.     Social History  Social History   Substance Use Topics     Smoking status: Never Smoker     Smokeless tobacco: Never Used     Alcohol use Yes      Comment: occasional   retired    ROS  Constitutional: low energy  Eyes: no vision change, diplopia or red eyes   Neck: no difficulty swallowing, no choking, no neck pain, no neck swelling  Cardiovascular: no chest pain, palpitations  Respiratory: no dyspnea, cough, shortness of breath or wheezing   GI: no nausea, vomiting, diarrhea or constipation, no abdominal pain   : no change in urine, no dysuria or hematuria  Musculoskeletal: no joint or muscle pain or swelling   Integumentary: no concerning lesions  Neuro: no loss of strength or sensation, no numbness or tingling, no tremor, no dizziness, no headache   Endo: no polyuria or polydipsia, +hot intolerance   Heme/Lymph: no concerning bumps, no bleeding problems   Allergy: no environmental allergies   Psych:  Sleep ok.     Physical Exam  /70  Pulse 76  Ht 1.626 m (5' 4\")  Wt 81.8 kg (180 lb 6 oz)  BMI 30.96 kg/m2  Body mass index is 30.96 " kg/(m^2).  Constitutional: no distress, comfortable, pleasant   Eyes: anicteric, normal extra-ocular movements, no lid lag or retraction  Neck: no thyromegaly, no discrete nodule  Cardiovascular: regular rate and rhythm, normal S1 and S2, no murmurs  Respiratory: clear to auscultation, no wheezes or crackles, normal breath sounds   Gastrointestinal:  nontender, no hepatomegaly, no masses   Musculoskeletal: no edema   Skin: no jaundice   Neurological: cranial nerves intact, 2+reflexes at patella, normal gait, no tremor on outstretched hands bilaterally  Psychological: appropriate mood   Lymphatic: no cervical  lymphadenopathy.      RESULTS       US thyroid 4/11/2013  Findings: The right lobe of the thyroid measures 3.8 x 1.6 x 0.9 cm. In the inferior pole, there is a 8 x 8 x 8 mm solid-appearing hypoechoic nodule with a thin hypoechoic rim. No internal calcifications or vascularity within this nodule. At the lower tip of the right thyroid, there is a 5 x 3 x 3 mm uniformly hyperechoic nodule with no internal calcifications or vascularity.      The left lobe of the thyroid measures 0.6 x 1.4 x 3.3 cm. At the lower tip there is a 5 x 5 x 3 mm uniformly hyperechoic nodule with no internal calcifications or flow on color Doppler imaging.      The isthmus measures 1 mm in thickness and appears unremarkable.       Impression:  1. 8 mm solid nodule in the right lobe of the thyroid would be amenable to ultrasound-guided FNA.  2. There are symmetric 5 mm hyperechoic nodules at the lower poles of the right and left lobes of the uncertain clinical significance. They may represent atypical appearing parathyroid glands.    FNA 8/21/2013  Thyroid, right lobe, ultrasound-guided fine needle aspiration:     Benign Consistent with a benign nodule (includes adenomatoid nodule, colloid nodule, etc.)    US thyroid 1/6/2016  COMPARISON: 4/11/2013     FINDINGS: The right thyroid measures 4.1 x 1.4 x 1.6 cm. There is a 1.0 x 0.9 x 1.2 cm  nodule in the inferior pole, previously 0.8 x 0.8 x  0.8 cm. There is some vascularity. No calcification. It is not hypervascular. There is a 4 mm nodule at the inferior tip of the right thyroid.     The thyroid isthmus measures 0.2 cm.     The left thyroid measures 3.5 x 1.0 x 1.4 cm. There is a 6 mm nodule at the inferior aspect.    IMPRESSION: Mild increase in size of the 1 cm nodule in the inferior right thyroid.    US thyroid 10/4/2016  FINDINGS:  The right lobe of the thyroid measures 4.2 x 1.3 x 1.4 cm.   The left lobe of the thyroid measures 4.1 x 1 x 1.5 cm.  The isthmus measures 2 mm in AP diameter.     Thyroid nodules are as follows:  1): Well-defined hypoechoic nodule in the inferior right thyroid lobe measuring 1.2 x 1 x 0.9 cm with mild internal vascularity, unchanged.  2): Ovoid uniformly hyperechoic non-shadowing nodule in the inferior right thyroid lobe measuring 0.7 x 0.4 x 0.3 cm without internal hypervascularity, unchanged.  3): Generally hyperechoic non-shadowing nodule with rim of decreased echogenicity in the inferior left thyroid lobe measuring 0.6 x 0.5 x  0.4 cm with minimal internal vascular flow, not significantly changed.        IMPRESSION: No significant interval change in 3 thyroid nodules, the largest of which is in the inferior right thyroid lobe measuring up to 1.2 cm.    ASSESSMENT:    Hailee Mayo is a 61 years old female with hx of multiple thyroid nodule, complex regional pain syndrome who is here for follow up multiple thyroid nodule and mild abnormality of thyroid function test.    1) multiple thyroid nodules: no compressive symptoms. She has had multiple nodules with the dominant nodule on the right lobe measured 1.2x1x0.9 cm well defined hypoechoic nodule. The remaining nodule was subcentimeter. Reviewed US image, no suspicious characteristics. I will plan for repeating US thyroid in October 2017.    2) abnormal thyroid function test: mildly low FT4 with normal TSH. Unclear  etiology. Patient will check whether she is taking biotin in her supplement or not. Repeat lab today.    PLAN:   - check lab for TSH, FT4 and TT4  - plan for US thyroid in October 2017 and contact patient with result    Addendum  ENDO THYROID LABS-[facility] Latest Ref Rng & Units 5/8/2017 12/12/2016   TSH 0.40 - 4.00 mU/L 1.81 1.92   T4 TOTAL 4.5 - 13.9 ug/dL 7.3    T4 FREE 0.76 - 1.46 ng/dL 0.72 (L) 0.74 (L)   Pt called back and reported taking Biotin 5000 mcg daily  Asked the pt to stop biotin for 2 weeks and repeat lab    Arvind Deal MD     Division of Diabetes and Endocrinology  Department of Medicine  [phone]

## 2017-05-08 NOTE — TELEPHONE ENCOUNTER
[facility] Call Center    Phone Message    Name of Caller: Hailee    Phone Number: Home number on file [phone] (home)    Best time to return call: Any    May a detailed message be left on voicemail: yes    Relation to patient: Self    Reason for Call: Other: Patient was to inform Dr. Samuels if she is taking Biotin and she is. Please advise.      Action Taken: Message routed to:  Adult Clinics: Endocrinology p [phone]

## 2017-05-08 NOTE — NURSING NOTE
"Hailee Mayo's goals for this visit include: Establish Care Thyroid Nodule  She requests these members of her care team be copied on today's visit information: YES    PCP: Kaleb Mcelroy    Referring Provider:  No referring provider defined for this encounter.    Chief Complaint   Patient presents with     RECHECK     Thyroid Nodule       Initial There were no vitals taken for this visit. Estimated body mass index is 30.73 kg/(m^2) as calculated from the following:    Height as of 11/7/16: 1.626 m (5' 4\").    Weight as of 2/27/17: 81.2 kg (179 lb).  Medication Reconciliation: complete    Do you need any medication refills at today's visit? NO    "

## 2017-05-08 NOTE — LETTER
5/8/2017      RE: Hailee Mayo  [address] [address] [place] [id number]     Dear Colleague,    Thank you for referring your patient, Hailee Mayo, to the [facility]. Please see a copy of my visit note below.         Endocrinology Note         Hailee is a 61 year old female presents today for multiple thyroid nodules.    HPI  Hailee Mayo is a 61 years old female with hx of multiple thyroid nodule, complex regional pain syndrome who is here for follow up multiple thyroid nodule and mild abnormality of thyroid function test. She was previously seen Dr.Shalamar Broussard. Last seen 11/7/2016.    She was noted to have multiple thyroid nodules since 2013 when she experienced something stuck in her throat and left sided sore throat that occurred intermittently. She did have FNA of the right sided nodule that was benign in 2013. She had serial thyroid ultrasound over the past year. The last US thyroid in 10/2016 showed multiple thyroid nodules with the dominant nodule in the right lobe described as well-defined hypoechoic nodule in the inferior right thyroid lobe measuring 1.2 x 1 x 0.9 cm. The remaining nodules were subcentimeter in size.    She continues to have intermittent left sided sore throat and food stuck in her throat. However, she did not have choking or difficulty swallowing or breathing. She has low energy, easily sweating and hot intolerance. Her recent 2 sets of TFT showed mild low FT4 and normal TSH. Lab on 12/12/16 showed TSH 1.92, FT4 0.74 (0.76-1.46). She has had complex regional pain syndrome affected left foot and leg.    Past Medical History  Past Medical History:   Diagnosis Date     Complex regional pain syndrome      Sleep problems      Sore throat      Trouble swallowing        Allergies  Allergies   Allergen Reactions     Penicillins      Vicodin [Hydrocodone-Acetaminophen] Other (See Comments)     Burning in abd     Medications  Current Outpatient Prescriptions    Medication Sig Dispense Refill     zolpidem (AMBIEN) 10 MG tablet Take 1 tablet (10 mg) by mouth nightly as needed 90 tablet 1     oxyCODONE (ROXICODONE) 5 MG IR tablet Take 1 tablet (5 mg) by mouth every 4 hours as needed for moderate to severe pain 150 tablet 0     gabapentin (NEURONTIN) 600 MG tablet Take 2 tablets (1,200 mg) by mouth 3 times daily 540 tablet 3     valACYclovir (VALTREX) 500 MG tablet Take 2 tablets (1,000 mg) by mouth daily 180 tablet 3     sertraline (ZOLOFT) 50 MG tablet Take 1 tablet (50 mg) by mouth daily 90 tablet 3     simvastatin (ZOCOR) 20 MG tablet Take 1 tablet (20 mg) by mouth At Bedtime 90 tablet 3     Multiple Vitamins-Minerals (MULTIVITAMIN ADULT PO) Take by mouth daily       Probiotic Product (PROBIOTIC DAILY PO) Take by mouth daily       ORDER FOR DME Equipment being ordered: Scooter 1 each 99     Family History  family history includes Alcohol/Drug in her maternal grandfather; C.A.D. in her maternal grandfather; CEREBROVASCULAR DISEASE in her mother and paternal grandmother; DIABETES in her father and maternal grandfather; Myocardial Infarction in her maternal grandfather.     Social History  Social History   Substance Use Topics     Smoking status: Never Smoker     Smokeless tobacco: Never Used     Alcohol use Yes      Comment: occasional   retired    ROS  Constitutional: low energy  Eyes: no vision change, diplopia or red eyes   Neck: no difficulty swallowing, no choking, no neck pain, no neck swelling  Cardiovascular: no chest pain, palpitations  Respiratory: no dyspnea, cough, shortness of breath or wheezing   GI: no nausea, vomiting, diarrhea or constipation, no abdominal pain   : no change in urine, no dysuria or hematuria  Musculoskeletal: no joint or muscle pain or swelling   Integumentary: no concerning lesions  Neuro: no loss of strength or sensation, no numbness or tingling, no tremor, no dizziness, no headache   Endo: no polyuria or polydipsia, +hot intolerance  "  Heme/Lymph: no concerning bumps, no bleeding problems   Allergy: no environmental allergies   Psych:  Sleep ok.     Physical Exam  /70  Pulse 76  Ht 1.626 m (5' 4\")  Wt 81.8 kg (180 lb 6 oz)  BMI 30.96 kg/m2  Body mass index is 30.96 kg/(m^2).  Constitutional: no distress, comfortable, pleasant   Eyes: anicteric, normal extra-ocular movements, no lid lag or retraction  Neck: no thyromegaly, no discrete nodule  Cardiovascular: regular rate and rhythm, normal S1 and S2, no murmurs  Respiratory: clear to auscultation, no wheezes or crackles, normal breath sounds   Gastrointestinal:  nontender, no hepatomegaly, no masses   Musculoskeletal: no edema   Skin: no jaundice   Neurological: cranial nerves intact, 2+reflexes at patella, normal gait, no tremor on outstretched hands bilaterally  Psychological: appropriate mood   Lymphatic: no cervical  lymphadenopathy.      RESULTS       US thyroid 4/11/2013  Findings: The right lobe of the thyroid measures 3.8 x 1.6 x 0.9 cm. In the inferior pole, there is a 8 x 8 x 8 mm solid-appearing hypoechoic nodule with a thin hypoechoic rim. No internal calcifications or vascularity within this nodule. At the lower tip of the right thyroid, there is a 5 x 3 x 3 mm uniformly hyperechoic nodule with no internal calcifications or vascularity.      The left lobe of the thyroid measures 0.6 x 1.4 x 3.3 cm. At the lower tip there is a 5 x 5 x 3 mm uniformly hyperechoic nodule with no internal calcifications or flow on color Doppler imaging.      The isthmus measures 1 mm in thickness and appears unremarkable.       Impression:  1. 8 mm solid nodule in the right lobe of the thyroid would be amenable to ultrasound-guided FNA.  2. There are symmetric 5 mm hyperechoic nodules at the lower poles of the right and left lobes of the uncertain clinical significance. They may represent atypical appearing parathyroid glands.    FNA 8/21/2013  Thyroid, right lobe, ultrasound-guided fine needle " aspiration:     Benign Consistent with a benign nodule (includes adenomatoid nodule, colloid nodule, etc.)    US thyroid 1/6/2016  COMPARISON: 4/11/2013     FINDINGS: The right thyroid measures 4.1 x 1.4 x 1.6 cm. There is a 1.0 x 0.9 x 1.2 cm nodule in the inferior pole, previously 0.8 x 0.8 x  0.8 cm. There is some vascularity. No calcification. It is not hypervascular. There is a 4 mm nodule at the inferior tip of the right thyroid.     The thyroid isthmus measures 0.2 cm.     The left thyroid measures 3.5 x 1.0 x 1.4 cm. There is a 6 mm nodule at the inferior aspect.    IMPRESSION: Mild increase in size of the 1 cm nodule in the inferior right thyroid.    US thyroid 10/4/2016  FINDINGS:  The right lobe of the thyroid measures 4.2 x 1.3 x 1.4 cm.   The left lobe of the thyroid measures 4.1 x 1 x 1.5 cm.  The isthmus measures 2 mm in AP diameter.     Thyroid nodules are as follows:  1): Well-defined hypoechoic nodule in the inferior right thyroid lobe measuring 1.2 x 1 x 0.9 cm with mild internal vascularity, unchanged.  2): Ovoid uniformly hyperechoic non-shadowing nodule in the inferior right thyroid lobe measuring 0.7 x 0.4 x 0.3 cm without internal hypervascularity, unchanged.  3): Generally hyperechoic non-shadowing nodule with rim of decreased echogenicity in the inferior left thyroid lobe measuring 0.6 x 0.5 x  0.4 cm with minimal internal vascular flow, not significantly changed.        IMPRESSION: No significant interval change in 3 thyroid nodules, the largest of which is in the inferior right thyroid lobe measuring up to 1.2 cm.    ASSESSMENT:    Hailee Mayo is a 61 years old female with hx of multiple thyroid nodule, complex regional pain syndrome who is here for follow up multiple thyroid nodule and mild abnormality of thyroid function test.    1) multiple thyroid nodules: no compressive symptoms. She has had multiple nodules with the dominant nodule on the right lobe measured 1.2x1x0.9 cm well  defined hypoechoic nodule. The remaining nodule was subcentimeter. Reviewed US image, no suspicious characteristics. I will plan for repeating US thyroid in October 2017.    2) abnormal thyroid function test: mildly low FT4 with normal TSH. Unclear etiology. Patient will check whether she is taking biotin in her supplement or not. Repeat lab today.    PLAN:   - check lab for TSH, FT4 and TT4  - plan for US thyroid in October 2017 and contact patient with result    Addendum  ENDO THYROID LABS-[facility] Latest Ref Rng & Units 5/8/2017 12/12/2016   TSH 0.40 - 4.00 mU/L 1.81 1.92   T4 TOTAL 4.5 - 13.9 ug/dL 7.3    T4 FREE 0.76 - 1.46 ng/dL 0.72 (L) 0.74 (L)   Pt called back and reported taking Biotin 5000 mcg daily  Asked the pt to stop biotin for 2 weeks and repeat lab    Arvind Deal MD     Division of Diabetes and Endocrinology  Department of Medicine  [phone]        Again, thank you for allowing me to participate in the care of your patient.      Sincerely,    Arvind Deal MD

## 2017-05-09 NOTE — TELEPHONE ENCOUNTER
Koki Vines LPN Harindhanavudhi, Tasma, MD Yesterday (1:32 PM)                    Patient taking 5000 mcg daily - medication list updated.   Koki Vines LPN (Routing comment)               Arvind Deal MD   [facility] 2 hours ago (12:01 PM)                Thank you.     Can you please ask her to stop biotin for 2 weeks and repeat thyroid lab afterward?     Thank you,   Arvind (Routing comment)                 Left message for patient to contact clinic.    Kaelyn Mattson, ALEX  Endocrine Care Coordinator  [facility]

## 2017-05-10 LAB — T4 FREE SERPL DIALY-MCNC: 0.9 NG/DL

## 2017-05-11 NOTE — TELEPHONE ENCOUNTER
Message was relayed to patient by nursing staff, Koki VARGAS. Labs also scheduled by Koki for 5/24/17.      Kaelyn Mattson RN  Endocrine Care Coordinator  [facility]

## 2017-05-22 ENCOUNTER — OFFICE VISIT (OUTPATIENT)
Dept: FAMILY MEDICINE | Facility: CLINIC | Age: 62
End: 2017-05-22

## 2017-05-22 VITALS
TEMPERATURE: 98.2 F | BODY MASS INDEX: 30.73 KG/M2 | RESPIRATION RATE: 20 BRPM | SYSTOLIC BLOOD PRESSURE: 111 MMHG | DIASTOLIC BLOOD PRESSURE: 75 MMHG | OXYGEN SATURATION: 95 % | HEART RATE: 68 BPM | WEIGHT: 179 LBS

## 2017-05-22 DIAGNOSIS — E78.5 HYPERLIPIDEMIA LDL GOAL <130: ICD-10-CM

## 2017-05-22 DIAGNOSIS — G90.522 COMPLEX REGIONAL PAIN SYNDROME TYPE 1 OF LEFT LOWER EXTREMITY: ICD-10-CM

## 2017-05-22 DIAGNOSIS — B00.9 HERPES SIMPLEX VIRUS INFECTION: ICD-10-CM

## 2017-05-22 DIAGNOSIS — G89.4 CHRONIC PAIN SYNDROME: Primary | ICD-10-CM

## 2017-05-22 LAB
AMPHETAMINES QUAL: NEGATIVE
BARBITURATES QUAL URINE: NEGATIVE
BENZODIAZEPINE QUAL URINE: NEGATIVE
BUPRENORPHINE QUAL URINE: NEGATIVE
CANNABINOIDS UR QL SCN: NEGATIVE
COCAINE QUAL URINE: NEGATIVE
METHAMPHETAMINE: NEGATIVE
METHODONE QUAL: NEGATIVE
MORPHINE QUAL: NEGATIVE
OXYCODONE QUAL: POSITIVE
TEMPERATURE OF URINE WAS BETWEEN 90-100 DEGREES F: YES

## 2017-05-22 RX ORDER — VALACYCLOVIR HYDROCHLORIDE 500 MG/1
500 TABLET, FILM COATED ORAL DAILY
Qty: 180 TABLET | Refills: 3 | COMMUNITY
Start: 2017-05-22 | End: 2017-12-18

## 2017-05-22 RX ORDER — OXYCODONE HYDROCHLORIDE 5 MG/1
5 TABLET ORAL EVERY 4 HOURS PRN
Qty: 150 TABLET | Refills: 0 | Status: SHIPPED | OUTPATIENT
Start: 2017-07-22 | End: 2017-08-14

## 2017-05-22 RX ORDER — OXYCODONE HYDROCHLORIDE 5 MG/1
5 TABLET ORAL EVERY 4 HOURS PRN
Qty: 150 TABLET | Refills: 0 | Status: SHIPPED | OUTPATIENT
Start: 2017-06-22 | End: 2017-05-22

## 2017-05-22 RX ORDER — OXYCODONE HYDROCHLORIDE 5 MG/1
5 TABLET ORAL EVERY 4 HOURS PRN
Qty: 150 TABLET | Refills: 0 | Status: SHIPPED | OUTPATIENT
Start: 2017-05-22 | End: 2017-05-22

## 2017-05-22 RX ORDER — SIMVASTATIN 20 MG
20 TABLET ORAL AT BEDTIME
Qty: 90 TABLET | Refills: 3 | Status: SHIPPED | OUTPATIENT
Start: 2017-05-22 | End: 2018-07-23

## 2017-05-22 NOTE — PROGRESS NOTES
HPI  61 year old female here for evaluation of several issues.    1. Chronic Pain (Complex Regional Pain Syndrome)  Utox - Expected   - Reviewed, expected  FAQ - 47.5 (previously 55 in 2/17 and 60 in 12/16)  Care Plan - Not yet completed, referred for   Naloxone - None  Other- Continues to do water aerobics 2-3x per week and practices mindful meditation    2. Depression and Anxiety  She is stable on sertraline. Feeling well and is happy with the amount of social activity she gets with water aerobics.     ROS  6 point ROS neg other than the symptoms noted above in the HPI.    MEDS  Current Outpatient Prescriptions   Medication     simvastatin (ZOCOR) 20 MG tablet     valACYclovir (VALTREX) 500 MG tablet     [START ON 7/22/2017] oxyCODONE (ROXICODONE) 5 MG IR tablet     BIOTIN PO     zolpidem (AMBIEN) 10 MG tablet     gabapentin (NEURONTIN) 600 MG tablet     sertraline (ZOLOFT) 50 MG tablet     Multiple Vitamins-Minerals (MULTIVITAMIN ADULT PO)     Probiotic Product (PROBIOTIC DAILY PO)     ORDER FOR DME     [DISCONTINUED] valACYclovir (VALTREX) 500 MG tablet     [DISCONTINUED] simvastatin (ZOCOR) 20 MG tablet     No current facility-administered medications for this visit.          FHx  Family History   Problem Relation Age of Onset     CEREBROVASCULAR DISEASE Mother      DIABETES Father      DIABETES Maternal Grandfather      C.A.D. Maternal Grandfather      Myocardial Infarction Maternal Grandfather      Alcohol/Drug Maternal Grandfather      CEREBROVASCULAR DISEASE Paternal Grandmother        PHYSICAL EXAMINATION:  Vital signs: /75 (BP Location: Left arm, Patient Position: Chair, Cuff Size: Adult Regular)  Pulse 68  Temp 98.2  F (36.8  C) (Oral)  Resp 20  Wt 179 lb (81.2 kg)  SpO2 95%  BMI 30.73 kg/m2    GENERAL:: healthy, alert and no distress  MS: extremities- no gross deformities noted, no edema  PSYCH: Alert and oriented times 3; speech- coherent , normal rate and volume; able to articulate  logical thoughts, able to abstract reason, no tangential thoughts, no hallucinations or delusions, affect- normal      LABS  Results for orders placed or performed in visit on 05/22/17 (from the past 24 hour(s))   Rapid Urine Drug Screen ([facility])   Result Value Ref Range    Amphetamines Qual NEGATIVE NEGATIVE    Barbiturates Qual Urine NEGATIVE NEGATIVE    Buprenorphine Qual Urine NEGATIVE NEGATIVE    Benzodiazepine Qual Urine NEGATIVE NEGATIVE    Cocaine Qual Urine NEGATIVE NEGATIVE    Cannabinoids Qual Urine NEGATIVE NEGATIVE    Methamphetamine Qual NEGATIVE NEGATIVE    Methadone Qual NEGATIVE NEGATIVE    Morphine Qual NEGATIVE NEGATIVE    Oxycodone Qual POSITIVE (A) NEGATIVE    Temperature of Urine was Between  Degrees F YES YES         ASSESSMENT/PLAN  1. Chronic pain syndrome  2. Complex regional pain syndrome type 1 of left lower extremit  Appears to be stable compared to 2/17. Her FAQ score has gone down to 47.5 from 55 in 3 months, but when asked about it she admits to scoring some questions lower than she should. We estimate her score to be closer to 60 on our own calculation, but given possible down trend in score it will be important to monitor this at next visit.     Behavioral health consult and care plan referral made today. She was unable to make her last appointment due to illness. Encouraged her to continue water aerobics and meditation.     - oxyCODONE (ROXICODONE) 5 MG IR tablet; Take 1 tablet (5 mg) by mouth every 4 hours as needed for moderate to severe pain Dispense: 150 tablet; Refill: 0  - BEHAVIORAL HEALTH REFERRAL ([facility])    3. Depression/Anxiety  Continue Sertraline 50 mg and continue meditation/exercise.     The medical student acted as a scribe and the encounter documented above was performed completely by me and the documentation accurately reflects the work I have performed today. Sandeep BATES MD    This note is scribed by Kaleb Mcelroy, medical student on  behalf of GLORIA ESCAMILLA

## 2017-05-22 NOTE — PATIENT INSTRUCTIONS
1. Chronic pain syndrome    - oxyCODONE (ROXICODONE) 5 MG IR tablet; Take 1 tablet (5 mg) by mouth every 4 hours as needed for moderate to severe pain  Dispense: 150 tablet; Refill: 0  - BEHAVIORAL HEALTH REFERRAL ([facility] and external)    2. Hyperlipidemia LDL goal <130  Refill    - simvastatin (ZOCOR) 20 MG tablet; Take 1 tablet (20 mg) by mouth At Bedtime  Dispense: 90 tablet; Refill: 3      PHarper

## 2017-05-22 NOTE — MR AVS SNAPSHOT
After Visit Summary   5/22/2017    Hailee Mayo    MRN: [id number]           Patient Information     Date Of Birth          1955        Visit Information        Provider Department      5/22/2017 11:00 AM Kaleb Escamilla MD [facility]        Today's Diagnoses     Chronic pain syndrome    -  1    Complex regional pain syndrome type 1 of left lower extremity        Hyperlipidemia LDL goal <130        Herpes simplex          Care Instructions    1. Chronic pain syndrome    - oxyCODONE (ROXICODONE) 5 MG IR tablet; Take 1 tablet (5 mg) by mouth every 4 hours as needed for moderate to severe pain  Dispense: 150 tablet; Refill: 0  - BEHAVIORAL HEALTH REFERRAL ([facility] inter[facility] and external)    2. Hyperlipidemia LDL goal <130  Refill    - simvastatin (ZOCOR) 20 MG tablet; Take 1 tablet (20 mg) by mouth At Bedtime  Dispense: 90 tablet; Refill: 3      PHarper            Follow-ups after your visit        Additional Services     [facility]       Referring MD: KALEB ESCAMILLA    May leave message on voicemail: Yes                  Your next 10 appointments already scheduled     Oct 04, 2017 10:00 AM CDT   US THYROID with MGUS1, MG [employer]   [facility]    [address] [place] [id number] [phone]           Please bring a list of your medicines (including vitamins, minerals and over-the-counter drugs). Also, tell your doctor about any allergies you may have. Wear comfortable clothes and leave your valuables at home.  You do not need to do anything special to prepare for your exam.  Please call the Imaging Department at your exam site with any questions.            Jan 09, 2018 12:15 PM CST   Return Visit with Enid Benavides MD   [facility] ([facility])    [address] [address] [place] [id number] [phone]              Who to  contact     Please call your clinic at [phone] to:    Ask questions about your health    Make or cancel appointments    Discuss your medicines    Learn about your test results    Speak to your doctor   If you have compliments or concerns about an experience at your clinic, or if you wish to file a complaint, please contact [facility] Patient Relations at [phone] or email us at [email or web address]         Additional Information About Your Visit        [employer] Information     [employer] gives you secure access to your electronic health record. If you see a primary care provider, you can also send messages to your care team and make appointments. If you have questions, please call your primary care clinic.  If you do not have a primary care provider, please call [phone] and they will assist you.      [employer] is an electronic gateway that provides easy, online access to your medical records. With [employer], you can request a clinic appointment, read your test results, renew a prescription or communicate with your care team.     To access your existing account, please contact your [facility] or call [phone] for assistance.        Care EveryWhere ID     This is your Care EveryWhere ID. This could be used by other organizations to access your [place] medical records  [id number]        Your Vitals Were     Pulse Temperature Respirations Pulse Oximetry BMI (Body Mass Index)       68 98.2  F (36.8  C) (Oral) 20 95% 30.73 kg/m2        Blood Pressure from Last 3 Encounters:   05/22/17 111/75   05/08/17 111/70   02/27/17 127/83    Weight from Last 3 Encounters:   05/22/17 179 lb (81.2 kg)   05/08/17 180 lb 6 oz (81.8 kg)   02/27/17 179 lb (81.2 kg)              We Performed the Following     BEHAVIORAL HEALTH REFERRAL ([facility])     Rapid Urine Drug Screen ([facility])          Today's Medication Changes          These  changes are accurate as of: 5/22/17 11:59 PM.  If you have any questions, ask your nurse or doctor.               Start taking these medicines.        Dose/Directions    oxyCODONE 5 MG IR tablet   Commonly known as:  ROXICODONE   Used for:  Chronic pain syndrome   Started by:  Kaleb Mcelroy MD        Dose:  5 mg   Start taking on:  7/22/2017   Take 1 tablet (5 mg) by mouth every 4 hours as needed for moderate to severe pain   Quantity:  150 tablet   Refills:  0         These medicines have changed or have updated prescriptions.        Dose/Directions    valACYclovir 500 MG tablet   Commonly known as:  VALTREX   This may have changed:  how much to take   Used for:  Herpes simplex virus infection   Changed by:  Kaleb Mcelroy MD        Dose:  500 mg   Take 1 tablet (500 mg) by mouth daily   Quantity:  180 tablet   Refills:  3            Where to get your medicines      These medications were sent to [facility] - [address], [facility] [address]     Phone:  [phone]     simvastatin 20 MG tablet         Some of these will need a paper prescription and others can be bought over the counter.  Ask your nurse if you have questions.     Bring a paper prescription for each of these medications     oxyCODONE 5 MG IR tablet                Primary Care Provider Office Phone # Fax #    Kaleb Mcelroy [phone]       [facility] [address] [address]        Thank you!     Thank you for choosing [facility]  for your care. Our goal is always to provide you with excellent care. Hearing back from our patients is one way we can continue to improve our services. Please take a few minutes to complete the written survey that you may receive in the mail after your visit with us. Thank you!             Your Updated Medication List - Protect others around you: Learn how to safely use, store and  throw away your medicines at www.disposemymeds.org.          This list is accurate as of: 5/22/17 11:59 PM.  Always use your most recent med list.                   Brand Name Dispense Instructions for use    BIOTIN PO      Take 5,000 mcg by mouth daily       gabapentin 600 MG tablet    NEURONTIN    540 tablet    Take 2 tablets (1,200 mg) by mouth 3 times daily       MULTIVITAMIN ADULT PO      Take by mouth daily       order for DME     1 each    Equipment being ordered: Scooter       oxyCODONE 5 MG IR tablet   Start taking on:  7/22/2017    ROXICODONE    150 tablet    Take 1 tablet (5 mg) by mouth every 4 hours as needed for moderate to severe pain       PROBIOTIC DAILY PO      Take by mouth daily       sertraline 50 MG tablet    ZOLOFT    90 tablet    Take 1 tablet (50 mg) by mouth daily       simvastatin 20 MG tablet    ZOCOR    90 tablet    Take 1 tablet (20 mg) by mouth At Bedtime       valACYclovir 500 MG tablet    VALTREX    180 tablet    Take 1 tablet (500 mg) by mouth daily       zolpidem 10 MG tablet    AMBIEN    90 tablet    Take 1 tablet (10 mg) by mouth nightly as needed

## 2017-05-24 ENCOUNTER — TELEPHONE (OUTPATIENT)
Dept: ENDOCRINOLOGY | Facility: CLINIC | Age: 62
End: 2017-05-24

## 2017-05-24 DIAGNOSIS — R94.6 THYROID FUNCTION TEST ABNORMAL: ICD-10-CM

## 2017-05-24 DIAGNOSIS — R94.6 ABNORMAL FINDING ON THYROID FUNCTION TEST: ICD-10-CM

## 2017-05-24 DIAGNOSIS — E04.2 MULTIPLE THYROID NODULES: ICD-10-CM

## 2017-05-24 LAB
T4 FREE SERPL-MCNC: 0.77 NG/DL (ref 0.76–1.46)
TSH SERPL DL<=0.05 MIU/L-ACNC: 1.63 MU/L (ref 0.4–4)

## 2017-05-24 PROCEDURE — 84439 ASSAY OF FREE THYROXINE: CPT | Performed by: INTERNAL MEDICINE

## 2017-05-24 PROCEDURE — 36415 COLL VENOUS BLD VENIPUNCTURE: CPT | Performed by: INTERNAL MEDICINE

## 2017-05-24 PROCEDURE — 84443 ASSAY THYROID STIM HORMONE: CPT | Performed by: INTERNAL MEDICINE

## 2017-05-24 NOTE — TELEPHONE ENCOUNTER
Patient notified of results and recommendations. She is agreeable with plan to remain off of Biotin.    Alecia Peña LPN  Adult Endocrinology  [facility] [place]      Per Say Message:  Please let the pt know that her TFT is normalized. The previous abnormality was likely biotin interference with lab assay. It biotin does not help her in any way, I would recommend NOT to restart. If she would like to continue biotin, please make sure that she informs the provider when they are checking thyroid test in the future.     Thank you,   Arvind

## 2017-05-26 NOTE — TELEPHONE ENCOUNTER
Patient called and is wondering if this is something she could do at a clinic somewhere in [place]. If not, can she wait until August to do this? If not, she could come in sooner. Patient states she lives far away from the clinic.     Leti Clark, [facility]

## 2017-06-06 NOTE — TELEPHONE ENCOUNTER
OK to wait until August.  But the issue is making the scheduling work  BH was not available at her last visit.  Fouzia

## 2017-06-06 NOTE — TELEPHONE ENCOUNTER
Will call patient and relay this information. I will put her on the recall list and when the August schedule is available, I will schedule appointments the same day if possible.     Leti Clark, [facility]

## 2017-06-07 NOTE — TELEPHONE ENCOUNTER
Spoke with patient and relayed information below. Patient understood and will be waiting for my call when schedules are released.     Leti Clark, CMA

## 2017-06-08 NOTE — TELEPHONE ENCOUNTER
When patient is called to schedule appt for chronic pain eval, we can schedule at the same time as her follow up with Dr. Mcelroy.  This means that it does not have to be when we have an appointment slot, but can be when we are in [facility] (just would need to alert behavioral health of the appt).  There is a behavioral health staff person typically on M Afternoon, Tues AM/PM, Wed AM, Th AM/PM, Fri AM/PM.

## 2017-07-17 NOTE — TELEPHONE ENCOUNTER
Patient scheduled 08/14/2017 at 10:00 with [facility] and 11:00 with provider.

## 2017-08-14 ENCOUNTER — OFFICE VISIT (OUTPATIENT)
Dept: PSYCHOLOGY | Facility: CLINIC | Age: 62
End: 2017-08-14

## 2017-08-14 ENCOUNTER — OFFICE VISIT (OUTPATIENT)
Dept: FAMILY MEDICINE | Facility: CLINIC | Age: 62
End: 2017-08-14

## 2017-08-14 ENCOUNTER — OFFICE VISIT (OUTPATIENT)
Dept: PHARMACY | Facility: CLINIC | Age: 62
End: 2017-08-14

## 2017-08-14 VITALS
DIASTOLIC BLOOD PRESSURE: 88 MMHG | RESPIRATION RATE: 16 BRPM | SYSTOLIC BLOOD PRESSURE: 130 MMHG | TEMPERATURE: 98.1 F | BODY MASS INDEX: 31.24 KG/M2 | WEIGHT: 182 LBS | HEART RATE: 74 BPM | OXYGEN SATURATION: 97 %

## 2017-08-14 DIAGNOSIS — F41.9 ANXIETY: ICD-10-CM

## 2017-08-14 DIAGNOSIS — G89.4 CHRONIC PAIN SYNDROME: Primary | ICD-10-CM

## 2017-08-14 DIAGNOSIS — G89.4 CHRONIC PAIN SYNDROME: ICD-10-CM

## 2017-08-14 DIAGNOSIS — G90.522 COMPLEX REGIONAL PAIN SYNDROME TYPE 1 OF LEFT LOWER EXTREMITY: Primary | ICD-10-CM

## 2017-08-14 RX ORDER — OXYCODONE HYDROCHLORIDE 5 MG/1
5 TABLET ORAL EVERY 4 HOURS PRN
Qty: 150 TABLET | Refills: 0 | Status: SHIPPED | OUTPATIENT
Start: 2017-09-14 | End: 2017-08-14

## 2017-08-14 RX ORDER — OXYCODONE HYDROCHLORIDE 5 MG/1
5 TABLET ORAL EVERY 4 HOURS PRN
Qty: 150 TABLET | Refills: 0 | Status: SHIPPED | OUTPATIENT
Start: 2017-08-14 | End: 2017-08-14

## 2017-08-14 RX ORDER — OXYCODONE HYDROCHLORIDE 5 MG/1
5 TABLET ORAL EVERY 4 HOURS PRN
Qty: 150 TABLET | Refills: 0 | Status: SHIPPED | OUTPATIENT
Start: 2017-10-14 | End: 2017-11-06

## 2017-08-14 NOTE — PROGRESS NOTES
"Behavioral Health Consult for Chronic Pain Management    Visit type: Health and Behavior Assessment  Meeting lasted: 60 minutes  Others present: patient     Hailee Mayo is a 61 year old,  female referred by Dr. Mcelroy for behavioral health evaluation as part of the Chronic Pain Management protocol at [facility]. Goal of behavioral health visit is to assist PCP with assessment and to co-create a plan to help patient with psychosocial aspects of pain.     Topics Discussed:  Oriented patient to team model of care for chronic pain and scope and purpose of assessment today.    Patient's Understanding of Pain Experience: Hailee states that the pain feels like \"hot electricity\" in her left toes.  It feels cold on the outside, even though it feels like it is burning on the inside.  When she does hot shower, there is part of her left foot that turns brighter pink around the ankle.  The pain comes from her left foot, up her calf to the front of her left leg. She feels as if it is starting on the right side as well.  She states she has also been experiencing a constant sore throat that is only on the left side as well.  Everything that happens, seems to occur on the left side and this is puzzling to her.  The pain has led her to do things in an intermittent fashion - she vacuums, but only vacuums a small piece at a time.  She feels scared about driving because she doesn't feel as if her reaction time with the pain in her left leg is as good as it was.     Patient Goals: Hailee states that her goals are to be able to go out and do stuff.  She greatly enjoys the two days/week that her grandbabies come over.  The connection she has been able to develop with them from having that time is irreplaceable.  She wants to be able to continue to spend time with them in this way.  Hailee also really wants to be able to stay living in her home.  It is getting harder to take care of, but it means a lot to her to " be in her own home with her stuff.  She wants to be able to continue to go to water aerobics with her mom and be able to go to breakfast and play cards with her sister and mom.     Current self-management strategies: Hailee reports that she has a healing mind cd that she uses when it is really hurting and this helps the pain a bit, that cd also helps her to fall asleep if she is having a hard time, she does a remy angelo yoga dvd on wednesdays, she goes to the water aerobics on mon and wed, she uses a body pillow as well.  She tried acupuncture and that felt terrible, but light massage helps a little as well.    Sleep: takes zolpidem nightly - [employer] all the time so schedule always off.  She states if she doesn't take the medication she falls asleep for an hour and then she is back up.  If she does take it, she gets 8 81/2 hours of sleep each night.  I     Nutrition:  Hailee reports having some stomach issues that were causing diarrhea.  Reports appetite it OK, but has to watch what she eats so it doesn't set off a bout.  Is only having diarrhea every couple of weeks now.  Takes a Having some stomach isues - appetite is ok, ghazala efoods that maribel normal bother me, biox4 - otc, now don't have as much diahrrhea anymore, every couple of weeks, like to eat cerral with gruit, blueberries love them, homemade jam, vegetabl are hard for me, eat just at dinner, those are things that sit well and do't cause a probem    Physical Activity:  Water aerobics, yoga dvd, had gone to physical therapy before this all happened, it didn't ever help me -kept going and it didn't do anything - feel better now than ever been.    Psychiatric History:  Hailee reports an incredibly stressful time in her life in 2013 which resulted in a low mood.  She was prescribed sertraline 50 mg and feels like that brought her back to her normal mood.  Not currently in counseling.  Saw a counselor one time for 3 sessions.      Trauma History:  None  reported, did not have a chance to directly ask.     History of Substance Use:    Alcohol: none at all - would have a glass of red wine at dinner at work    Tobacco: never   Caffeine: coffee 2 cups/day   Illicit Drugs: never any other drugs   Misuse of Prescription Drugs: when still working taking hydrocodone - feet and ankel would hurt so bad, I would get to hotel and take an extra - allergic to hydrocondone     Family History of Substance Use:  Son has significant substance use problems.  In 2013 he moved from [place] with his one year baby to live with Hailee and her .  He was stealing her oxycodone, so he was asked to leave.  He went to live with Hailee's parents.  There were a number of things that happened as well there, so he was asked to move out.  She states he is still smoking pot, she doesn't talk to him much.  She feels as if he is always trying to scam them.     Work History: , left with disability 2011, 6/1/2012 retired.        Patient Active Problem List   Diagnosis     Sural neuritis     Saphenous neuritis     Abnormality of gait     Herpes simplex     Reflex sympathetic dystrophy of lower extremity     Insomnia     Plantar fasciitis     Menopausal syndrome (hot flashes)     Thyroid nodule     Anxiety     Hyperlipidemia with target LDL less than 130     Complex regional pain syndrome type 1 of left lower extremity     Gastroesophageal reflux disease without esophagitis     Chronic pain syndrome     Primary osteoarthritis of first carpometacarpal joint of right hand     Thumb pain, right     Thyroid function test abnormal       Current Outpatient Prescriptions   Medication     simvastatin (ZOCOR) 20 MG tablet     valACYclovir (VALTREX) 500 MG tablet     oxyCODONE (ROXICODONE) 5 MG IR tablet     BIOTIN PO     zolpidem (AMBIEN) 10 MG tablet     gabapentin (NEURONTIN) 600 MG tablet     sertraline (ZOLOFT) 50 MG tablet     Multiple Vitamins-Minerals (MULTIVITAMIN ADULT PO)     Probiotic  Product (PROBIOTIC DAILY PO)     ORDER FOR DME     No current facility-administered medications for this visit.        Objective: Ms. Mayo appears to be awake and alert and oriented to time, place and person for today's visit.    Assessment tools:     Opioid Risk Tool Score:  OPIOID RISK TOOL TOTAL SCORE 8/14/2017   Total Score 3      Total Score Risk Category  0 - 3 = Low Risk  of future problems with Opioid     Functional Assessment  Questionnaire -5  FUNCTIONAL ASSESSMENT QUESTIONNAIRE SCORE 3/13/2017 5/23/2017   Total Score 55 60     PHQ-9 SCORE 7/13/2015 3/21/2016 6/13/2016   Total Score 1 - -   Total Score - 8 0     Assessment:   Ms. Mayo was referred by Dr. Mcelroy for a behavioral health evaluation to address chronic pain syndrome. The patient is benefiting from ongoing engagement in water aerobics, doing yoga dvd at home, and taking medications.  She also finds some benefit from using the healing mind cd when pain worsens.  Discussed additional advantages of doing relaxation exercises regularly, even when pain is not exacerbated to help with training her body so it can potentially go more easily into a state of relaxation when pain worsens.      Stage of change: Action  Diagnosis: chronic pain syndrome.    Plan:   1. Provided psychoeducation about the relationships between chronic pain, sleep, physical activity, nutrition/weight and stress.  Handouts are in AVS.   2. Developed Personal Care Plan for Chronic Pain for self-management strategies (see patient care instructions).  3. No follow up with  is planned at this time, but let patient know she is welcome to schedule in the future if she is interested.

## 2017-08-14 NOTE — MR AVS SNAPSHOT
After Visit Summary   8/14/2017    Hailee Mayo    MRN: [id number]           Patient Information     Date Of Birth          1955        Visit Information        Provider Department      8/14/2017 11:00 AM Kaleb Escamilla MD \[facility]        Today's Diagnoses     Chronic pain syndrome    -  1    Anxiety          Care Instructions    Here is the plan from today's visit    1. Chronic pain syndrome    - Rapid Urine Drug Screen (\[facility]\"")  - oxyCODONE (ROXICODONE) 5 MG IR tablet; Take 1 tablet (5 mg) by mouth every 4 hours as needed for moderate to severe pain  Dispense: 150 tablet; Refill: 0  - naloxone (NARCAN) nasal spray; Spray 1 spray (4 mg) into one nostril alternating nostrils as needed for opioid reversal (every 2-3 minutes until assistance arrives.)  Dispense: 0.2 mL; Refill: 1    2. Anxiety    Refill sent to [facility]    - sertraline (ZOLOFT) 50 MG tablet; Take 1 tablet (50 mg) by mouth daily  Dispense: 90 tablet; Refill: 3          Please call or return to clinic if your symptoms don't go away.        Follow up plan        Please make a clinic appointment for follow up with me (KALEB ESCAMILLA) in 3  months .      (Narcan) Narlaxone - Prescribed for emergency use      Sertraline refill was filled today and sent to Pharmacy    Thank you for coming to [facility] today.    Lab Testing:  **If you had lab testing today and your results are reassuring or normal they will be mailed to you or sent through [employer] within 7 days.   **If the lab tests need quick action we will call you with the results.  The phone number we will call with results is # [phone] (home) none (work). If this is not the best number please call our clinic and change the number.  Medication Refills:    Was sent to preferred pharmacy.      If you need any refills please call your pharmacy and they will contact us.   If you need to  your refill at a new pharmacy, please contact the new  pharmacy directly. The new pharmacy will help you get your medications transferred faster.   Scheduling:  If you have any concerns about today's visit or wish to schedule another appointment please call our office during normal business hours [phone] (8-5:00 M-F)  If a referral was made to a [facility] and you don't get a call from central scheduling please call [phone].  If a Mammogram was ordered for you at [facility] call [phone] to schedule or change your appointment.  If you had an XRay/CT/Ultrasound/MRI ordered the number is [phone] to schedule or change your radiology appointment.   Medical Concerns:  If you have urgent medical concerns please call [phone] at any time of the day.  If you have a medical emergency please call [phone].            Follow-ups after your visit        Your next 10 appointments already scheduled     Oct 04, 2017 10:00 AM CDT   US THYROID with MGUS1, [facility] TE[facility]    [id number] [place] [id number] [phone]           Please bring a list of your medicines (including vitamins, minerals and over-the-counter drugs). Also, tell your doctor about any allergies you may have. Wear comfortable clothes and leave your valuables at home.  You do not need to do anything special to prepare for your exam.  Please call the Imaging Department at your exam site with any questions.            Jan 09, 2018 12:15 PM CST   [facility] Visit with [facility]ides [facility]   [facility]    [address] [place] [id number] [phone]              Who to contact     Please call your clinic at [phone] to:    Ask questions about your health    Make or cancel appointments    Discuss your medicines    Learn about your test results    Speak to your doctor   If you have compliments or concerns about an experience at your clinic, or  if you wish to file a complaint, please contact [facility] Patient Relations at [phone] or email us at [email or web address]         Additional Information About Your Visit        [facility] Information     [employer] gives you secure access to your electronic health record. If you see a primary care provider, you can also send messages to your care team and make appointments. If you have questions, please call your primary care clinic.  If you do not have a primary care provider, please call [phone] and they will assist you.      [employer] is an electronic gateway that provides easy, online access to your medical records. With [employer], you can request a clinic appointment, read your test results, renew a prescription or communicate with your care team.     To access your existing account, please contact your [facility] or call [phone] for assistance.        Care EveryWhere ID     This is your Care EveryWhere ID. This could be used by other organizations to access your [facility] medical records  [id number]        Your Vitals Were     Pulse Temperature Respirations Pulse Oximetry BMI (Body Mass Index)       74 98.1  F (36.7  C) (Oral) 16 97% 31.24 kg/m2        Blood Pressure from Last 3 Encounters:   08/14/17 130/88   05/22/17 111/75   05/08/17 111/70    Weight from Last 3 Encounters:   08/14/17 182 lb (82.6 kg)   05/22/17 179 lb (81.2 kg)   05/08/17 180 lb 6 oz (81.8 kg)              We Performed the Following     Rapid Urine Drug Screen ([facility])          Today's Medication Changes          These changes are accurate as of: 8/14/17 11:48 AM.  If you have any questions, ask your nurse or doctor.               Start taking these medicines.        Dose/Directions    naloxone nasal spray   Commonly known as:  NARCAN   Used for:  Chronic pain syndrome   Started by:  Kaleb Mcelroy MD        Dose:  4 mg   Spray 1 spray (4 mg) into one nostril  alternating nostrils as needed for opioid reversal (every 2-3 minutes until assistance arrives.)   Quantity:  0.2 mL   Refills:  1       oxyCODONE 5 MG IR tablet   Commonly known as:  ROXICODONE   Used for:  Chronic pain syndrome   Started by:  Kaleb Mcelroy MD        Dose:  5 mg   Start taking on:  10/14/2017   Take 1 tablet (5 mg) by mouth every 4 hours as needed for moderate to severe pain   Quantity:  150 tablet   Refills:  0         Stop taking these medicines if you haven't already. Please contact your care team if you have questions.     BIOTIN PO   Stopped by:  Kaleb Mcelroy MD                Where to get your medicines      These medications were sent to [facility] - [address], [facility] [address]     Phone:  [phone]     sertraline 50 MG tablet         These medications were sent to [facility] [address] [place], [place] [address] [address] [address] [place] [address] [facility] [address]     Phone:  [phone]     naloxone nasal spray         Some of these will need a paper prescription and others can be bought over the counter.  Ask your nurse if you have questions.     Bring a paper prescription for each of these medications     oxyCODONE 5 MG IR tablet                Primary Care Provider Office Phone # Fax #    Kaleb Mcelroy [phone]       [address] [place] [phone]        Equal Access to Services     NICOLA ELLIS : Hadii jimena galloo Soarmindaali, waaxda luqadaha, qaybta kaalmada adeegyada, waxay dodie guallpa. So [facility] [phone].    ATENCIÓN: Si habla español, tiene a roca disposición servicios gratuitos de asistencia lingüística. Llame al [phone].    We comply with applicable federal civil rights laws and [place] laws. We do not discriminate on the basis of race, color, national origin, age, disability sex, sexual  orientation or gender identity.            Thank you!     Thank you for choosing [facility]  for your care. Our goal is always to provide you with excellent care. Hearing back from our patients is one way we can continue to improve our services. Please take a few minutes to complete the written survey that you may receive in the mail after your visit with us. Thank you!             Your Updated Medication List - Protect others around you: Learn how to safely use, store and throw away your medicines at www.disposemymeds.org.          This list is accurate as of: 8/14/17 11:48 AM.  Always use your most recent med list.                   Brand Name Dispense Instructions for use Diagnosis    gabapentin 600 MG tablet    NEURONTIN    540 tablet    Take 2 tablets (1,200 mg) by mouth 3 times daily    Reflex sympathetic dystrophy       MULTIVITAMIN ADULT PO      Take by mouth daily        naloxone nasal spray    NARCAN    0.2 mL    Spray 1 spray (4 mg) into one nostril alternating nostrils as needed for opioid reversal (every 2-3 minutes until assistance arrives.)    Chronic pain syndrome       order for DME     1 each    Equipment being ordered: [employer]    Reflex sympathetic dystrophy of the lower limb       oxyCODONE 5 MG IR tablet   Start taking on:  10/14/2017    ROXICODONE    150 tablet    Take 1 tablet (5 mg) by mouth every 4 hours as needed for moderate to severe pain    Chronic pain syndrome       PROBIOTIC DAILY PO      Take by mouth daily        sertraline 50 MG tablet    ZOLOFT    90 tablet    Take 1 tablet (50 mg) by mouth daily    Anxiety       simvastatin 20 MG tablet    ZOCOR    90 tablet    Take 1 tablet (20 mg) by mouth At Bedtime    Hyperlipidemia LDL goal <130       valACYclovir 500 MG tablet    VALTREX    180 tablet    Take 1 tablet (500 mg) by mouth daily    Herpes simplex virus infection       zolpidem 10 MG tablet    AMBIEN    90 tablet    Take 1 tablet (10 mg) by mouth nightly as  needed    Insomnia, unspecified type

## 2017-08-14 NOTE — MR AVS SNAPSHOT
After Visit Summary   8/14/2017    Hailee Mayo    MRN: [id number]           Patient Information     Date Of Birth          1955        Visit Information        Provider Department      8/14/2017 10:00 AM Maya Larios PsyD [facility]        Care Instructions    Personal Care Plan for Chronic Pain    1.  Personal Goals:  Engaging and spending time with my grandkids as much as I can, getting to my water aerobics and spending that time with the others int he class and my mom, spending time with my mom and my sister, feel like I am still of use in this world, that I am important to my grandma, a good partner to my , to stay in my house and keep it clean      2.  Sleep:     *  Basic sleep plan:    *reduce or eliminate caffeine and daytime naps    * relaxation before bed    * limit screen time 1-2 hours prior to bed    * establish dark/quiet sleep environment   *  Nighttime medications including the following: zolpidem    3.  Physical Activity:     * Home/community based activity:    * Yoga dvd 1x/week    * water aerobics 2x/week:     * Listen to your body.  Pace yourself for success.  Don't over-do it.  Don't under do it.   * Balance activities with rest and set realistic goals.     4.  Nutrition/Weight:     * Try to eat at least 5 servings of fresh fruits and vegetables each day.   * Limit processed foods and foods high in sugar, sodium and fat.   * Maintain a healthy weight.     5.  Mood/Stress Management:    * Listening to healing cd for relaxation and pain management - can use for acute pain, but it's also helpful to do this daily to help retrain brain   * Listening Danny music   * Taking sertraline medication daily    6.  Pain:     * Non-medication treatments:    * light massage as tolerated    7.  Pain Medications: oxycodone as prescribed    No follow up with behavioral health planned at this time        ESPERANZA Villarreal., COLTON Rodriguez., MANOJ Parrish,  CHRISTIANO Acosta, YOVANI New, & ADRIANA Mcgovern. Cognitive behavioral therapy for chronic pain among veterans: Therapist manual. [place]: [employer].        JEAN CLAUDE Villarreal, COLTON Rodriguez., TYLER Parrish., CHRISTIANO Acosta, YOVANI New, & ADRIANA Mcgovern. Cognitive behavioral therapy for chronic pain among veterans: Therapist manual. [place] [employer].          Follow-ups after your visit        Your next 10 appointments already scheduled     Aug 14, 2017 11:00 AM CDT   Return Visit with Kaleb Mcelroy MD   [facility] ([facility]    [address] [place] [address],  [address] [place] [address]   [phone]            Oct 04, 2017 10:00 AM CDT   US THYROID with MGUS1, [facility] US TECH   [facility]    [address] [place] [id number] [phone]           Please bring a list of your medicines (including vitamins, minerals and over-the-counter drugs). Also, tell your doctor about any allergies you may have. Wear comfortable clothes and leave your valuables at home.  You do not need to do anything special to prepare for your exam.  Please call the Imaging Department at your exam site with any questions.            Jan 09, 2018 12:15 PM CST   [facility]id [facility]    [address] [place] [id number] [phone]              Who to contact     Please call your clinic at [phone] to:    Ask questions about your health    Make or cancel appointments    Discuss your medicines    Learn about your test results    Speak to your doctor   If you have compliments or concerns about an experience at your clinic, or if you wish to file a complaint, please contact [facility] Patient Relations at [phone] or email us at [email or web address]         Additional Information  About Your Visit        [employer] Information     [email or web address] gives you secure access to your electronic health record. If you see a primary care provider, you can also send messages to your care team and make appointments. If you have questions, please call your primary care clinic.  If you do not have a primary care provider, please call [phone] and they will assist you.      [email or web address] is an electronic gateway that provides easy, online access to your medical records. With [email or web address], you can request a clinic appointment, read your test results, renew a prescription or communicate with your care team.     To access your existing account, please contact your [facility] or call [phone] for assistance.        Care EveryWhere ID     This is your Care EveryWhere ID. This could be used by other organizations to access your [facility] medical records  [id number]         Blood Pressure from Last 3 Encounters:   05/22/17 111/75   05/08/17 111/70   02/27/17 127/83    Weight from Last 3 Encounters:   05/22/17 179 lb (81.2 kg)   05/08/17 180 lb 6 oz (81.8 kg)   02/27/17 179 lb (81.2 kg)              Today, you had the following     No orders found for display       Primary Care Provider Office Phone # Fax #    Kaleb Mcelroy [phone]       [address] [address]        Equal Access to Services     ANKITA ELLIS : Hadii jimena jones hadasho Solorena, waaxda luqadaha, qaybta kaalmada katty quintero. So [facility] [phone].    ATENCIÓN: Si habla español, tiene a roca disposición servicios gratuitos de asistencia lingüística. Tito al [phone].    We comply with applicable federal civil rights laws and [place] laws. We do not discriminate on the basis of race, color, national origin, age, disability sex, sexual orientation or gender identity.            Thank you!     Thank you for choosing [facility]  for your care. Our goal is always to provide you with excellent care. Hearing back from our patients is one way we can continue to improve our services. Please take a few minutes to complete the written survey that you may receive in the mail after your visit with us. Thank you!             Your Updated Medication List - Protect others around you: Learn how to safely use, store and throw away your medicines at www.disposemymeds.org.          This list is accurate as of: 8/14/17 10:45 AM.  Always use your most recent med list.                   Brand Name Dispense Instructions for use Diagnosis    BIOTIN PO      Take 5,000 mcg by mouth daily    Abnormal finding on thyroid function test, Multiple thyroid nodules, Thyroid function test abnormal       gabapentin 600 MG tablet    NEURONTIN    540 tablet    Take 2 tablets (1,200 mg) by mouth 3 times daily    Reflex sympathetic dystrophy       MULTIVITAMIN ADULT PO      Take by mouth daily        order for DME     1 each    Equipment being ordered: Scooter    Reflex sympathetic dystrophy of the lower limb       oxyCODONE 5 MG IR tablet    ROXICODONE    150 tablet    Take 1 tablet (5 mg) by mouth every 4 hours as needed for moderate to severe pain    Chronic pain syndrome       PROBIOTIC DAILY PO      Take by mouth daily        sertraline 50 MG tablet    ZOLOFT    90 tablet    Take 1 tablet (50 mg) by mouth daily    Anxiety       simvastatin 20 MG tablet    ZOCOR    90 tablet    Take 1 tablet (20 mg) by mouth At Bedtime    Hyperlipidemia LDL goal <130       valACYclovir 500 MG tablet    VALTREX    180 tablet    Take 1 tablet (500 mg) by mouth daily    Herpes simplex virus infection       zolpidem 10 MG tablet    AMBIEN    90 tablet    Take 1 tablet (10 mg) by mouth nightly as needed    Insomnia, unspecified type

## 2017-08-14 NOTE — PROGRESS NOTES
Clinical Pharmacy Note     Hailee was referred by Dr. Mcelroy for pharmacy services for [facility]      MEDICATION REVIEW:  Discussed all medication indications, dosage and effectiveness, adverse effects, and adherence with patient/caregiver.    Pt had meds with them: no  Pt had med list with them: no  Pt was knowledgeable about meds: yes  Medications set up by: self      Medication Discrepancies  Medications on EMR med list that pt is NOT taking:  none  Medications pt IS taking that are NOT on EMR med list (e.g., from specialist, hospital): yes, Biotin vitamin product   OTC meds/ dietary supplements pt taking on own that are NOT on EMR med list:  none  Dosage listed differently than how patient is taking: none  Frequency listed differently than how patient is taking: none  Duplicate medication on list (two occurrences of the same medication):  none  TOTAL NUMBER OF MEDICATION DISCREPANCIES:  1  ______________________________________________________________________      Subjective:  Hailee is here today to meet with Dr. Mcelroy.  I have reviewed Hailee's medication list today and discussed any barriers or questions she may have regarding the medication.    Medication review:  Biotin: was stopped because it was changing the thyroid test.  Stopped after discussing the issue with the lab and later receiving a letter from Endocrinology.      Probiotic- taken daily.  Has been helpful for her digestion.  She describes the occasional diarrheal episode, but overall feels regular.      Insomnia:  Zolpidem:  Takes once each night.  She has tried to stop taking this in the past  But is kept up by her thoughts. She has been on this daily for years.  She started because of jetlag related to her employment, but now it is from her described insomnia.      Pain:  Consistent opioid dose.  Has not changed in years.    Oxycodone:  5mg AM, 10 mg in the afternoon,  10 mg at bedtime.    Long term dose.        Patient reported being compliant  all of the time   Patient reports no side effects.      Objective:    There were no vitals taken for this visit.  BP Readings from Last 6 Encounters:   08/14/17 130/88   05/22/17 111/75   05/08/17 111/70   02/27/17 127/83   12/12/16 116/75   11/07/16 100/60     CrCl cannot be calculated (Patient's most recent sCr result is older than the maximum 90 days allowed.).  GFR Estimate   Date Value Ref Range Status   06/13/2016 90.7 mL/min/1.7 m2 Final     GFR Estimate If Black   Date Value Ref Range Status   06/13/2016 109.8 mL/min/1.7 m2 Final   There were no vitals taken for this visit.    Drug Therapy Assessment:  Drug therapy problems identified:       1. Chronic pain syndrome  Currently controlled with 25mg of oxycodone IR.  This is considered a 37.5 mg Morphine Equivalent.  The patient has a long history sean this medication and appears very consistent with the dose.  The medication was refilled one month ago, and she is due now for this Rx.   Because Hailee is also taking a Ambien nightly, I would consider this drug combination a potential risk for adverse effects.  While the MME dose is not above 50 MME, this combination would qualify for a potentially dangerous drug combination.  Recommend to educate patient on Naloxone Rx and provide an Rx.        All medications were reviewed and found to be indicated, effective, safe and convenient unless drug therapy problem identified as described above.        Drug Therapy Plan and Follow up:    Plan  1. Chronic pain syndrome  Provide Naloxone in case of medication overdose.          Follow up: with PCP  Patient was provided with written instructions/medication list via AVS        Options for treatment and/or follow-up care were reviewed with the patient. Patient was engaged and actively involved in the decision making process.  Haileesuzanne Mayo verbalized understanding of the options discussed and was satisfied with the final plan.      Dr. Mcelroy was provided my action  in  clinic today and  was available for supervision during this visit and is the authorizing prescriber for this visit through the pharmacist collaborative practice agreement.      Inderjit Harrell, Pharm.D             Total Time: 20  # DTPs Identified: 1    Medical Condition 1: Pain (i.e. somatic, visceral, neuropathic), Goals of therapy: At Goal, Drug Class: Other,  ,  , Safety: Undesirable effect,  , Intervention: Initiate drug, Verification: Provider Agreed   ,  ,  ,  ,  ,  ,  ,  ,     ,  ,  ,  ,  ,  ,  ,  ,     ,  ,  ,

## 2017-08-14 NOTE — PATIENT INSTRUCTIONS
Here is the plan from today's visit    1. Chronic pain syndrome    - Rapid Urine Drug Screen (\[facility]\"")  - oxyCODONE (ROXICODONE) 5 MG IR tablet; Take 1 tablet (5 mg) by mouth every 4 hours as needed for moderate to severe pain  Dispense: 150 tablet; Refill: 0  - naloxone (NARCAN) nasal spray; Spray 1 spray (4 mg) into one nostril alternating nostrils as needed for opioid reversal (every 2-3 minutes until assistance arrives.)  Dispense: 0.2 mL; Refill: 1    2. Anxiety    Refill sent to [facility]    - sertraline (ZOLOFT) 50 MG tablet; Take 1 tablet (50 mg) by mouth daily  Dispense: 90 tablet; Refill: 3          Please call or return to clinic if your symptoms don't go away.        Follow up plan        Please make a clinic appointment for follow up with me (GLORIA ESCAMILLA) in 3  months .      (Narcan) Narlaxone - Prescribed for emergency use      Sertraline refill was filled today and sent to Pharmacy    Thank you for coming to [facility] today.    Lab Testing:  **If you had lab testing today and your results are reassuring or normal they will be mailed to you or sent through [employer] within 7 days.   **If the lab tests need quick action we will call you with the results.  The phone number we will call with results is # [phone] (home) none (work). If this is not the best number please call our clinic and change the number.  Medication Refills:    Was sent to preferred pharmacy.      If you need any refills please call your pharmacy and they will contact us.   If you need to  your refill at a new pharmacy, please contact the new pharmacy directly. The new pharmacy will help you get your medications transferred faster.   Scheduling:  If you have any concerns about today's visit or wish to schedule another appointment please call our office during normal business hours [phone] (8-5:00 M-F)  If a referral was made to a [facility] and you don't get a call from [facility]  scheduling please call [phone].  If a Mammogram was ordered for you at [facility] call [phone] to schedule or change your appointment.  If you had an XRay/CT/Ultrasound/MRI ordered the number is [phone] to schedule or change your radiology appointment.   Medical Concerns:  If you have urgent medical concerns please call [phone] at any time of the day.  If you have a medical emergency please call [phone].

## 2017-08-14 NOTE — PROGRESS NOTES
HPI:       Hailee Mayo is a 61 year old who presents for the following  No chief complaint on file.    Throat Irritation-Left Side, Refused swab     Chronic Pain Follow-Up Visit     Location of pain: Toes half moon, ankle into left calf, right toes  Analgesia/pain control:         Recent changes:  same        Overall control:Inadequate pain control    Care Plan    Chronic Pain Care Plan completed Yes    Are you able to follow the care plan? Yes    Activity level/function:    What does the pain stop you from doing? Unable to Exercise,long distant walking, unable to work and complete daily living duties, unable to stand for long period of times.    Functional Assessment  Questionnaire -5  55 today  FUNCTIONAL ASSESSMENT QUESTIONNAIRE SCORE 3/13/2017 5/23/2017   Total Score 55 60        Current morphine equivalents/day = 37.5 mg    Naloxone WILL be prescribed.  Adverse effects: No     Adherence    How often do you take extra pain medicine Never    Did you take your pain medication today? YES, First dose,7:50am    Home Exercise? 1 day/week for an average of 15-30 minutes  Other treatments         Counseling ?  no -Sister         Physical therapy? no - Completed years previously,Pool has helped    Risk Factors:      Sleep:  Good      Mood/anxiety:  controlled      Recent family or social stressors:  death in family:  Father passed 3 years ago      Other risks: none     Database checked today? Yes. Details: expected    Urine toxicology testing: expected                       Adherence and Exercise  Medication side effects: yes: Increased sweat  How often is a medication missed? Never  Exercise:yoga/stretching and Water areobics 1 day/week for an average of 15-30 minutes                 Review of Systems:   Review of Systems   Negative ROS except as noted above in HPI         Physical Exam:     /88  Pulse 74  Temp 98.1  F (36.7  C) (Oral)  Resp 16  Wt 182 lb (82.6 kg)  SpO2 97%  BMI 31.24  kg/m2    There is no height or weight on file to calculate BMI.  Vitals were reviewed and were normal     Physical Exam   Constitutional: She appears well-developed and well-nourished. No distress.   HENT:   Head: Normocephalic and atraumatic.   Mouth/Throat: Oropharynx is clear and moist.   L post pharynx with mild erythema     Musculoskeletal:   Pain to palpation on posterior L lower leg  Warm  Not discolored  Pulses intact         Results:     Results for orders placed or performed in visit on 08/14/17   Rapid Urine Drug Screen ([facility])   Result Value Ref Range    Amphetamines Qual NEGATIVE NEGATIVE    Barbiturates Qual Urine NEGATIVE NEGATIVE    Buprenorphine Qual Urine NEGATIVE NEGATIVE    Benzodiazepine Qual Urine NEGATIVE NEGATIVE    Cocaine Qual Urine NEGATIVE NEGATIVE    Cannabinoids Qual Urine NEGATIVE NEGATIVE    Methamphetamine Qual NEGATIVE NEGATIVE    Methadone Qual NEGATIVE NEGATIVE    Morphine Qual NEGATIVE NEGATIVE    Oxycodone Qual POSITIVE (A) NEGATIVE    Temperature of Urine was Between  Degrees F YES YES       Assessment and Plan     1. Chronic pain syndrome  Would like to increase midday dose to 10mg  Did care plan today    Utox - expected   - expected  FAQ - 55  Care Plan - Yes - done today  Naloxone - Rx'd today    - Rapid Urine Drug Screen ([facility])  - oxyCODONE (ROXICODONE) 5 MG IR tablet; Take 1 tablet (5 mg) by mouth every 4 hours as needed for moderate to severe pain  Dispense: 150 tablet; Refill: 0  - naloxone (NARCAN) nasal spray; Spray 1 spray (4 mg) into one nostril alternating nostrils as needed for opioid reversal (every 2-3 minutes until assistance arrives.)  Dispense: 0.2 mL; Refill: 1    2. Anxiety  Refill  - sertraline (ZOLOFT) 50 MG tablet; Take 1 tablet (50 mg) by mouth daily  Dispense: 90 tablet; Refill: 3      3. RTC 3 months  Refill      Kaleb Mcelroy MD

## 2017-08-14 NOTE — PATIENT INSTRUCTIONS
Personal Care Plan for Chronic Pain    1.  Personal Goals:  Engaging and spending time with my grandkids as much as I can, getting to my water aerobics and spending that time with the others int he class and my mom, spending time with my mom and my sister, feel like I am still of use in this world, that I am important to my grandma, a good partner to my , to stay in my house and keep it clean      2.  Sleep:     *  Basic sleep plan:    *reduce or eliminate caffeine and daytime naps    * relaxation before bed    * limit screen time 1-2 hours prior to bed    * establish dark/quiet sleep environment   *  Nighttime medications including the following: zolpidem    3.  Physical Activity:     * Home/community based activity:    * Yoga dvd 1x/week    * water aerobics 2x/week:     * Listen to your body.  Pace yourself for success.  Don't over-do it.  Don't under do it.   * Balance activities with rest and set realistic goals.     4.  Nutrition/Weight:     * Try to eat at least 5 servings of fresh fruits and vegetables each day.   * Limit processed foods and foods high in sugar, sodium and fat.   * Maintain a healthy weight.     5.  Mood/Stress Management:    * Listening to healing cd for relaxation and pain management - can use for acute pain, but it's also helpful to do this daily to help retrain brain   * Listening Danny music   * Taking sertraline medication daily    6.  Pain:     * Non-medication treatments:    * light massage as tolerated    7.  Pain Medications: oxycodone as prescribed    No follow up with behavioral health planned at this time        JEAN CLAUDE Villarreal, GORAN Rodriguez, MANOJ Parrish, DENYS Acosta., JIMY New., & ADRIANA Mcgovern. Cognitive behavioral therapy for chronic pain among veterans: Therapist manual. [place] [employer].        JEAN CLAUDE Villarreal McKellar, J.D., MANOJ Parrish, DENYS Acosta., JIMY New., & Shaniqua B.ETHAN. Cognitive behavioral therapy for chronic pain among  veterans: Therapist manual. [place] [employer].

## 2017-09-05 DIAGNOSIS — F41.9 ANXIETY: ICD-10-CM

## 2017-09-05 DIAGNOSIS — G47.00 INSOMNIA, UNSPECIFIED TYPE: ICD-10-CM

## 2017-09-05 RX ORDER — ZOLPIDEM TARTRATE 10 MG/1
10 TABLET ORAL
Qty: 90 TABLET | Refills: 1 | Status: SHIPPED | OUTPATIENT
Start: 2017-09-05 | End: 2017-09-07

## 2017-09-05 NOTE — TELEPHONE ENCOUNTER
Request for medication refill:    Date of last visit at clinic: 08/14/17    Please complete refill if appropriate and CLOSE ENCOUNTER.    Closing the encounter signifies the refill is complete.    If refill has been denied, please complete the smart phrase .smirefuse and route it to the [facility] RN TRIAGE pool to inform the patient and the pharmacy.    Sharron Rodríguez, CMA

## 2017-09-06 DIAGNOSIS — G47.00 INSOMNIA, UNSPECIFIED TYPE: ICD-10-CM

## 2017-09-06 NOTE — TELEPHONE ENCOUNTER
[facility] phone call message- patient requesting a refill:    Full Medication Name:     zolpidem (AMBIEN) 10 MG tablet         Dose:     Pharmacy confirmed as   [facility] [address] [place] [address] [address] AT [address]  [facility] [address]  Phone: [phone] Fax: [phone]      Additional Comments: patient is requesting refill  Of above medication.     OK to leave a message on voice mail? Yes    Primary language: English      needed? No    Call taken on September 6, 2017 at 9:01 AM by Anastasiia Gonzalez

## 2017-09-07 RX ORDER — ZOLPIDEM TARTRATE 10 MG/1
10 TABLET ORAL
Qty: 90 TABLET | Refills: 1 | Status: SHIPPED | OUTPATIENT
Start: 2017-09-07 | End: 2017-11-06

## 2017-09-07 NOTE — TELEPHONE ENCOUNTER
Request for medication refill: Patient needs refill sent to [facility] pharmacy which is updated in their chart. Last refill went to [facility].    Date of last visit at clinic: 8-14-17    Please complete refill if appropriate and CLOSE ENCOUNTER.    Closing the encounter signifies the refill is complete.    If refill has been denied, please complete the smart phrase .smirefuse and route it to the [facility] RN TRIAGE pool to inform the patient and the pharmacy.    Catalina Molina

## 2017-10-04 ENCOUNTER — RADIANT APPOINTMENT (OUTPATIENT)
Dept: ULTRASOUND IMAGING | Facility: CLINIC | Age: 62
End: 2017-10-04
Attending: INTERNAL MEDICINE
Payer: MEDICARE

## 2017-10-04 DIAGNOSIS — R94.6 ABNORMAL FINDING ON THYROID FUNCTION TEST: ICD-10-CM

## 2017-10-04 DIAGNOSIS — E04.2 MULTIPLE THYROID NODULES: ICD-10-CM

## 2017-10-04 DIAGNOSIS — R94.6 THYROID FUNCTION TEST ABNORMAL: ICD-10-CM

## 2017-10-04 PROCEDURE — 76536 US EXAM OF HEAD AND NECK: CPT

## 2017-10-10 DIAGNOSIS — G90.50 REFLEX SYMPATHETIC DYSTROPHY: ICD-10-CM

## 2017-10-10 RX ORDER — GABAPENTIN 600 MG/1
1200 TABLET ORAL 3 TIMES DAILY
Qty: 540 TABLET | Refills: 3 | Status: SHIPPED | OUTPATIENT
Start: 2017-10-10 | End: 2018-07-23

## 2017-10-10 NOTE — TELEPHONE ENCOUNTER
Request for medication refill:  Gabapentine 600mg    Date of last visit at clinic: 08/14/2017    Please complete refill if appropriate and CLOSE ENCOUNTER.    Closing the encounter signifies the refill is complete.    If refill has been denied, please complete the smart phrase .smirefuse and route it to the [facility] RN TRIAGE pool to inform the patient and the pharmacy.    Simran Jensen, CMA

## 2017-11-06 ENCOUNTER — OFFICE VISIT (OUTPATIENT)
Dept: FAMILY MEDICINE | Facility: CLINIC | Age: 62
End: 2017-11-06

## 2017-11-06 VITALS
DIASTOLIC BLOOD PRESSURE: 78 MMHG | TEMPERATURE: 97.6 F | SYSTOLIC BLOOD PRESSURE: 116 MMHG | OXYGEN SATURATION: 98 % | RESPIRATION RATE: 18 BRPM | HEART RATE: 63 BPM

## 2017-11-06 DIAGNOSIS — G90.522 COMPLEX REGIONAL PAIN SYNDROME TYPE 1 OF LEFT LOWER EXTREMITY: ICD-10-CM

## 2017-11-06 DIAGNOSIS — G89.4 CHRONIC PAIN SYNDROME: Primary | ICD-10-CM

## 2017-11-06 DIAGNOSIS — G47.00 INSOMNIA, UNSPECIFIED TYPE: ICD-10-CM

## 2017-11-06 RX ORDER — OXYCODONE HYDROCHLORIDE 5 MG/1
5 TABLET ORAL EVERY 4 HOURS PRN
Qty: 150 TABLET | Refills: 0 | Status: SHIPPED | OUTPATIENT
Start: 2017-12-06 | End: 2017-11-06

## 2017-11-06 RX ORDER — OXYCODONE HYDROCHLORIDE 5 MG/1
5 TABLET ORAL EVERY 4 HOURS PRN
Qty: 150 TABLET | Refills: 0 | Status: SHIPPED | OUTPATIENT
Start: 2018-01-06 | End: 2018-02-05

## 2017-11-06 RX ORDER — ZOLPIDEM TARTRATE 10 MG/1
10 TABLET ORAL
Qty: 90 TABLET | Refills: 1 | Status: SHIPPED | OUTPATIENT
Start: 2017-11-06 | End: 2018-02-08

## 2017-11-06 RX ORDER — OXYCODONE HYDROCHLORIDE 5 MG/1
5 TABLET ORAL EVERY 4 HOURS PRN
Qty: 150 TABLET | Refills: 0 | Status: SHIPPED | OUTPATIENT
Start: 2017-11-06 | End: 2017-11-06

## 2017-11-06 ASSESSMENT — ENCOUNTER SYMPTOMS
CONSTIPATION: 1
DYSPHORIC MOOD: 1
SLEEP DISTURBANCE: 1
DIARRHEA: 0
VOMITING: 0
DYSURIA: 0
NAUSEA: 0
BRUISES/BLEEDS EASILY: 1
SINUS PAIN: 0
CHEST TIGHTNESS: 1
CHILLS: 0
FEVER: 0
RHINORRHEA: 0

## 2017-11-06 NOTE — MR AVS SNAPSHOT
After Visit Summary   11/6/2017    Hailee Mayo    MRN: [id number]           Patient Information     Date Of Birth          1955        Visit Information        Provider Department      11/6/2017 11:00 AM Kaleb Escamilla MD [facility]        Today's Diagnoses     Chronic pain syndrome    -  1    Complex regional pain syndrome type 1 of left lower extremity        Insomnia, unspecified type          Care Instructions    Here is the plan from today's visit    1. Chronic pain syndrome  - Rapid Urine Drug Screen ([facility])  - oxyCODONE IR (ROXICODONE) 5 MG tablet; Take 1 tablet (5 mg) by mouth every 4 hours as needed for moderate to severe pain  Dispense: 150 tablet; Refill: 0    2. Complex regional pain syndrome type 1 of left lower extremity  - Med refill today. Continue regimen as before. Follow up in 3 months    3. Insomnia, unspecified type  - zolpidem (AMBIEN) 10 MG tablet; Take 1 tablet (10 mg) by mouth nightly as needed  Dispense: 90 tablet; Refill: 1    Please call or return to clinic if your symptoms don't go away.    Follow up plan  Please make a clinic appointment for follow up with me (KALEB ESCAMILLA) in 3 months.    Thank you for coming to [facility] today.  Lab Testing:  **If you had lab testing today and your results are reassuring or normal they will be mailed to you or sent through [employer] within 7 days.   **If the lab tests need quick action we will call you with the results.  The phone number we will call with results is # [phone] (home) none (work). If this is not the best number please call our clinic and change the number.  Medication Refills:  If you need any refills please call your pharmacy and they will contact us.   If you need to  your refill at a new pharmacy, please contact the new pharmacy directly. The new pharmacy will help you get your medications transferred faster.   Scheduling:  If you have any concerns about today's visit  or wish to schedule another appointment please call our office during normal business hours [phone] (8-5:00 M-F)  If a referral was made to a [facility] and you don't get a call from central scheduling please call [phone].  If a Mammogram was ordered for you at [facility] call [phone] to schedule or change your appointment.  If you had an XRay/CT/Ultrasound/MRI ordered the number is [phone] to schedule or change your radiology appointment.   Medical Concerns:  If you have urgent medical concerns please call [phone] at any time of the day.            Follow-ups after your visit        Your next 10 appointments already scheduled     Jan 09, 2018 12:15 PM CST   Return Visit with Enid Ben[facility]ides MD   [facility]    [address] [place] [id number] [phone]              Who to contact     Please call your clinic at [phone] to:    Ask questions about your health    Make or cancel appointments    Discuss your medicines    Learn about your test results    Speak to your doctor   If you have compliments or concerns about an experience at your clinic, or if you wish to file a complaint, please contact [facility] Patient Relations at [phone] or email us at [email or web address]         Additional Information About Your Visit        Reva System[employer]art Information     [employer] gives you secure access to your electronic health record. If you see a primary care provider, you can also send messages to your care team and make appointments. If you have questions, please call your primary care clinic.  If you do not have a primary care provider, please call [phone] and they will assist you.      Logi-Serve is an electronic gateway that provides easy, online access to your medical records. With Logi-Serve, you can request a clinic appointment, read your test results, renew a  prescription or communicate with your care team.     To access your existing account, please contact your [facility] or call [phone] for assistance.        Care EveryWhere ID     This is your Care EveryWhere ID. This could be used by other organizations to access your [facility] medical records  [id number]        Your Vitals Were     Pulse Temperature Respirations Pulse Oximetry Breastfeeding?       63 97.6  F (36.4  C) (Oral) 18 98% No        Blood Pressure from Last 3 Encounters:   11/06/17 116/78   08/14/17 130/88   05/22/17 111/75    Weight from Last 3 Encounters:   08/14/17 182 lb (82.6 kg)   05/22/17 179 lb (81.2 kg)   05/08/17 180 lb 6 oz (81.8 kg)              We Performed the Following     Rapid Urine Drug Screen ([facility])          Today's Medication Changes          These changes are accurate as of: 11/6/17 11:55 AM.  If you have any questions, ask your nurse or doctor.               Start taking these medicines.        Dose/Directions    oxyCODONE IR 5 MG tablet   Commonly known as:  ROXICODONE   Used for:  Chronic pain syndrome   Started by:  Kaleb Mcelroy MD        Dose:  5 mg   Start taking on:  1/6/2018   Take 1 tablet (5 mg) by mouth every 4 hours as needed for moderate to severe pain   Quantity:  150 tablet   Refills:  0            Where to get your medicines      Some of these will need a paper prescription and others can be bought over the counter.  Ask your nurse if you have questions.     Bring a paper prescription for each of these medications     oxyCODONE IR 5 MG tablet    zolpidem 10 MG tablet                Primary Care Provider Office Phone # Fax #    Kaleb Mcelroy [phone]       [address] [address]        Equal Access to Services     ANKITA ELLIS : angela Lopez, katty taylor. So [facility] [phone].    ATENCIÓN:  Si jonnathan gilmore, tiene a roca disposición servicios gratuitos de asistencia lingüística. Tito adamson [phone].    We comply with applicable federal civil rights laws and [place] laws. We do not discriminate on the basis of race, color, national origin, age, disability, sex, sexual orientation, or gender identity.            Thank you!     Thank you for choosing [facility]  for your care. Our goal is always to provide you with excellent care. Hearing back from our patients is one way we can continue to improve our services. Please take a few minutes to complete the written survey that you may receive in the mail after your visit with us. Thank you!             Your Updated Medication List - Protect others around you: Learn how to safely use, store and throw away your medicines at www.disposemymeds.org.          This list is accurate as of: 11/6/17 11:55 AM.  Always use your most recent med list.                   Brand Name Dispense Instructions for use Diagnosis    gabapentin 600 MG tablet    NEURONTIN    540 tablet    Take 2 tablets (1,200 mg) by mouth 3 times daily    Reflex sympathetic dystrophy       MULTIVITAMIN ADULT PO      Take by mouth daily        naloxone nasal spray    NARCAN    0.2 mL    Spray 1 spray (4 mg) into one nostril alternating nostrils as needed for opioid reversal (every 2-3 minutes until assistance arrives.)    Chronic pain syndrome       order for DME     1 each    Equipment being ordered: Scooter    Reflex sympathetic dystrophy of the lower limb       oxyCODONE IR 5 MG tablet   Start taking on:  1/6/2018    ROXICODONE    150 tablet    Take 1 tablet (5 mg) by mouth every 4 hours as needed for moderate to severe pain    Chronic pain syndrome       PROBIOTIC DAILY PO      Take by mouth daily        sertraline 50 MG tablet    ZOLOFT    90 tablet    Take 1 tablet (50 mg) by mouth daily    Anxiety       simvastatin 20 MG tablet    ZOCOR    90 tablet    Take 1 tablet (20  mg) by mouth At Bedtime    Hyperlipidemia LDL goal <130       valACYclovir 500 MG tablet    VALTREX    180 tablet    Take 1 tablet (500 mg) by mouth daily    Herpes simplex virus infection       zolpidem 10 MG tablet    AMBIEN    90 tablet    Take 1 tablet (10 mg) by mouth nightly as needed    Insomnia, unspecified type

## 2017-11-06 NOTE — PATIENT INSTRUCTIONS
Here is the plan from today's visit    1. Chronic pain syndrome  - Rapid Urine Drug Screen ([facility])  - oxyCODONE IR (ROXICODONE) 5 MG tablet; Take 1 tablet (5 mg) by mouth every 4 hours as needed for moderate to severe pain  Dispense: 150 tablet; Refill: 0    2. Complex regional pain syndrome type 1 of left lower extremity  - Med refill today. Continue regimen as before. Follow up in 3 months    3. Insomnia, unspecified type  - zolpidem (AMBIEN) 10 MG tablet; Take 1 tablet (10 mg) by mouth nightly as needed  Dispense: 90 tablet; Refill: 1    Please call or return to clinic if your symptoms don't go away.    Follow up plan  Please make a clinic appointment for follow up with me (GLORIA ESCAMILLA) in 3 months.    Thank you for coming to [facility] today.  Lab Testing:  **If you had lab testing today and your results are reassuring or normal they will be mailed to you or sent through [employer] within 7 days.   **If the lab tests need quick action we will call you with the results.  The phone number we will call with results is # [phone] (home) none (work). If this is not the best number please call our clinic and change the number.  Medication Refills:  If you need any refills please call your pharmacy and they will contact us.   If you need to  your refill at a new pharmacy, please contact the new pharmacy directly. The new pharmacy will help you get your medications transferred faster.   Scheduling:  If you have any concerns about today's visit or wish to schedule another appointment please call our office during normal business hours [phone] (8-5:00 M-F)  If a referral was made to a [facility] and you don't get a call from central scheduling please call [phone].  If a Mammogram was ordered for you at [facility] call [phone] to schedule or change your appointment.  If you had an XRay/CT/Ultrasound/MRI ordered the number is [phone] to schedule or  change your radiology appointment.   Medical Concerns:  If you have urgent medical concerns please call [phone] at any time of the day.

## 2017-12-18 DIAGNOSIS — B00.9 HERPES SIMPLEX VIRUS INFECTION: ICD-10-CM

## 2017-12-18 RX ORDER — VALACYCLOVIR HYDROCHLORIDE 500 MG/1
500 TABLET, FILM COATED ORAL DAILY
Qty: 180 TABLET | Refills: 3 | Status: SHIPPED | OUTPATIENT
Start: 2017-12-18 | End: 2018-10-29

## 2017-12-18 NOTE — TELEPHONE ENCOUNTER
Request for medication refill:    Date of last visit at clinic: 11/06/2017    Please complete refill if appropriate and CLOSE ENCOUNTER.    Closing the encounter signifies the refill is complete.    If refill has been denied, please complete the smart phrase .smirefuse and route it to the [facility] RN TRIAGE pool to inform the patient and the pharmacy.    Richard Hewitt, CMA

## 2017-12-31 ENCOUNTER — HEALTH MAINTENANCE LETTER (OUTPATIENT)
Age: 62
End: 2017-12-31

## 2018-01-09 ENCOUNTER — OFFICE VISIT (OUTPATIENT)
Dept: DERMATOLOGY | Facility: CLINIC | Age: 63
End: 2018-01-09
Payer: MEDICARE

## 2018-01-09 VITALS
HEIGHT: 64 IN | WEIGHT: 186 LBS | BODY MASS INDEX: 31.76 KG/M2 | DIASTOLIC BLOOD PRESSURE: 79 MMHG | OXYGEN SATURATION: 95 % | HEART RATE: 57 BPM | SYSTOLIC BLOOD PRESSURE: 124 MMHG

## 2018-01-09 DIAGNOSIS — L85.9 HYPERKERATOSIS: ICD-10-CM

## 2018-01-09 DIAGNOSIS — L82.1 SEBORRHEIC KERATOSIS: Primary | ICD-10-CM

## 2018-01-09 DIAGNOSIS — L60.3 BRITTLE NAILS: ICD-10-CM

## 2018-01-09 PROCEDURE — 99212 OFFICE O/P EST SF 10 MIN: CPT | Performed by: DERMATOLOGY

## 2018-01-09 ASSESSMENT — PAIN SCALES - GENERAL: PAINLEVEL: NO PAIN (0)

## 2018-01-09 NOTE — LETTER
1/9/2018         RE: Hailee Mayo  [address] [place] [id number]        Dear Colleague,    Thank you for referring your patient, Hailee Mayo, to the [facility]. Please see a copy of my visit note below.    [facility] Dermatology Note      Dermatology Problem List:  1Altagracia SK    Encounter Date: Jan 9, 2018    CC:  Chief Complaint   Patient presents with     RECHECK     1 year skin check - lesion of concern on right crows feet and skin tags on right neck         History of Present Illness:  Ms. Hailee Mayo is a 62 year old female who presents as a follow-up for skin cehck. The patient was last seen 01/10/2017 when cryotherapy was done on her right temple, left thigh and back. Since her last visit, he has skin tags along her right neck that are bothersome.  The lesion her right temple also seems to be returning.    No other lesions of concern today.    Past Medical History:   Patient Active Problem List   Diagnosis     Sural neuritis     Saphenous neuritis     Abnormality of gait     Herpes simplex     Reflex sympathetic dystrophy of lower extremity     Insomnia     Plantar fasciitis     Menopausal syndrome (hot flashes)     Thyroid nodule     Anxiety     Hyperlipidemia with target LDL less than 130     Complex regional pain syndrome type 1 of left lower extremity     Gastroesophageal reflux disease without esophagitis     Chronic pain syndrome     Primary osteoarthritis of first carpometacarpal joint of right hand     Thumb pain, right     Thyroid function test abnormal     Past Medical History:   Diagnosis Date     Complex regional pain syndrome      Sleep problems      Sore throat      Trouble swallowing      Past Surgical History:   Procedure Laterality Date     ------------OTHER-------------      Surgery to lengthen Left calf muscle     BUNIONECTOMY      Both feet     COLONOSCOPY N/A 9/7/2016    Procedure: COMBINED COLONOSCOPY, SINGLE OR MULTIPLE  BIOPSY/POLYPECTOMY BY BIOPSY;  Surgeon: Duane, William Charles, MD;  Location: [facility] OR     COLONOSCOPY WITH CO2 INSUFFLATION N/A 9/7/2016    Procedure: COLONOSCOPY WITH CO2 INSUFFLATION;  Surgeon: Duane, William Charles, MD;  Location: [facility] OR     GYN SURGERY      uterine polyp removed     LARYNGOSCOPY, ESOPHAGOSCOPY,  BIOPSY, COMBINED  5/20/2013    Procedure: COMBINED LARYNGOSCOPY, ESOPHAGOSCOPY,  BIOPSY;  Direct Laryngoscopy , Esophagoscopy;  Surgeon: Tawanda Bryson MD;  Location: [facility] OR     LUMPECTOMY BREAST BILATERAL       ORTHOPEDIC SURGERY      bunionectomy     TONSILLECTOMY         Social History:  The patient is retired, used to work as a . The patient denies use of tanning beds. The patient does not drink alcohol, smoke or use tobacco.     Family History:  There is no family history of skin cancer.    Medications:  Current Outpatient Prescriptions   Medication Sig Dispense Refill     valACYclovir (VALTREX) 500 MG tablet Take 1 tablet (500 mg) by mouth daily 180 tablet 3     oxyCODONE IR (ROXICODONE) 5 MG tablet Take 1 tablet (5 mg) by mouth every 4 hours as needed for moderate to severe pain 150 tablet 0     zolpidem (AMBIEN) 10 MG tablet Take 1 tablet (10 mg) by mouth nightly as needed 90 tablet 1     gabapentin (NEURONTIN) 600 MG tablet Take 2 tablets (1,200 mg) by mouth 3 times daily 540 tablet 3     sertraline (ZOLOFT) 50 MG tablet Take 1 tablet (50 mg) by mouth daily 90 tablet 3     naloxone (NARCAN) nasal spray Spray 1 spray (4 mg) into one nostril alternating nostrils as needed for opioid reversal (every 2-3 minutes until assistance arrives.) 0.2 mL 1     simvastatin (ZOCOR) 20 MG tablet Take 1 tablet (20 mg) by mouth At Bedtime 90 tablet 3     Multiple Vitamins-Minerals (MULTIVITAMIN ADULT PO) Take by mouth daily       Probiotic Product (PROBIOTIC DAILY PO) Take by mouth daily       ORDER FOR DME Equipment being ordered: Scooter 1 each 99       Allergies   Allergen  "Reactions     Penicillins      Vicodin [Hydrocodone-Acetaminophen] Other (See Comments)     Burning in abd       Review of Systems:    -Constitutional: The patient feels well otherwise.     Physical exam:  Vitals: /79 (BP Location: Left arm, Patient Position: Chair, Cuff Size: Adult Regular)  Pulse 57  Ht 1.626 m (5' 4\")  Wt 84.4 kg (186 lb)  SpO2 95%  BMI 31.93 kg/m2  GEN: This is a well developed, well-nourished female in no acute distress, in a pleasant mood.    SKIN: Total skin excluding the undergarment areas was performed. The exam included the head/face, neck, both arms, chest, back, abdomen, both legs, digits and/or nails. Declines genital exam  -There is a waxy stuck on tan to brown papule on the neck , trunk and extremities.  - hyperkeratosis of heels  -right temple is normal  -No other lesions of concern on areas examined.   Component      Latest Ref Rng & Units 5/24/2017   Amphetamines Qual      NEGATIVE    Barbiturates Qual Urine      NEGATIVE    Buprenorphine Qual Urine      NEGATIVE    Benzodiazepine Qual Urine      NEGATIVE    Cocaine Qual Urine      NEGATIVE    Cannabinoids Qual Urine      NEGATIVE    Methamphetamine Qual      NEGATIVE    Methadone Qual      NEGATIVE    Morphine Qual      NEGATIVE    Oxycodone Qual      NEGATIVE    Temperature of Urine was Between  Degrees F      YES    T4 Free      0.76 - 1.46 ng/dL 0.77   TSH      0.40 - 4.00 mU/L 1.63       Impression/Plan:  1. Seborrheic keratosis- neck, trunk and extremities    No further intervention required. Patient to report changes.     Declines cryo        2. Hyperkeratosis of heels    Ure 40% cream nightly     3. Brittle nails- has failed biotin, normal TSH    Avoid wet work    Gloves    Protein intake      Follow-up prn for new or changing lesions.       Staff Involved:  Scribe/Staff      Scribe Disclosure:   I, Krystin Gray, am serving as a scribe to document services personally performed by Dr. Enid Benavides, based on " data collection and the provider's statements to me.     Provider Disclosure:   The documentation recorded by the scribe accurately reflects the services I personally performed and the decisions made by me.    Enid Benavides MD    Department of Dermatology  [facility]: Phone: [phone], Fax:[phone]  [facility]: Phone: [phone], Fax: [phone]        Again, thank you for allowing me to participate in the care of your patient.        Sincerely,        Enid Benavides MD

## 2018-01-09 NOTE — PATIENT INSTRUCTIONS
Brittle Nails: tips for safe use of nail cosmetics    Brittle nails chip and split easily and are analogous to dry chapped hands. To a certain extent, nail strength is inherited, but there are lots of environmental factors that contribute to the health and strength of nails. Certain medical conditions such as anemia and protein deficiency can cause nails to be brittle. The following tips will help keep your fingernails healthy.       Brittle nails are exacerbated by household cleaning chemicals and soapy water. Always wear gloves for household and wet work chores, especially washing dishes.       Avoid antibacterial soaps containing triclosan which exacerbates brittle nails.      Nail cosmetics, especially nail polish remover dehydrates the nail plate and causes the nails to be brittle. In some cases a layer of nail polish actually helps the integrity of the nail plate by reinforcing it with a protective coating. If you use nail polish do not remove it more than once a week.       Unlike bones that derive strength from calcium, the nails contain keratin. Taking calcium supplements will not help strengthen nails, getting an adequate amount of protein in your diet will help.         General tips for safe use of nail cosmetics      Do not allow your manicurist to clip or cut your cuticles. If you perform your manicures at home do not cut your cuticles.       Purchase your own instruments to take with you to the nail salon. Wash your instruments in soapy water after each use ( is fine) or swab with alcohol.      If you experience any discomfort, stinging, burning or itching after nail treatments, you may be having an irritant or allergic reaction to one of the products. The nail products should be removed immediately.      If you wear artificial nail extensions (acrylates, wraps, gels, or tips), keep your nails short to avoid mechanical damage to the nail bed. Long nails  increase the risk of having problems with your nail enhancements. Remove the extensions at the first sign of onycholysis (lifting of the nail).

## 2018-01-09 NOTE — MR AVS SNAPSHOT
After Visit Summary   1/9/2018    Hailee Mayo    MRN: [id number]           Patient Information     Date Of Birth          1955        Visit Information        Provider Department      1/9/2018 12:15 PM Enid Benavides MD [facility]        Today's Diagnoses     Seborrheic keratosis    -  1    Hyperkeratosis        Brittle nails          Care Instructions                                       Brittle Nails: tips for safe use of nail cosmetics    Brittle nails chip and split easily and are analogous to dry chapped hands. To a certain extent, nail strength is inherited, but there are lots of environmental factors that contribute to the health and strength of nails. Certain medical conditions such as anemia and protein deficiency can cause nails to be brittle. The following tips will help keep your fingernails healthy.       Brittle nails are exacerbated by household cleaning chemicals and soapy water. Always wear gloves for household and wet work chores, especially washing dishes.       Avoid antibacterial soaps containing triclosan which exacerbates brittle nails.      Nail cosmetics, especially nail polish remover dehydrates the nail plate and causes the nails to be brittle. In some cases a layer of nail polish actually helps the integrity of the nail plate by reinforcing it with a protective coating. If you use nail polish do not remove it more than once a week.       Unlike bones that derive strength from calcium, the nails contain keratin. Taking calcium supplements will not help strengthen nails, getting an adequate amount of protein in your diet will help.         General tips for safe use of nail cosmetics      Do not allow your manicurist to clip or cut your cuticles. If you perform your manicures at home do not cut your cuticles.       Purchase your own instruments to take with you to the nail salon. Wash your instruments in soapy water after each use ( is fine)  or swab with alcohol.      If you experience any discomfort, stinging, burning or itching after nail treatments, you may be having an irritant or allergic reaction to one of the products. The nail products should be removed immediately.      If you wear artificial nail extensions (acrylates, wraps, gels, or tips), keep your nails short to avoid mechanical damage to the nail bed. Long nails increase the risk of having problems with your nail enhancements. Remove the extensions at the first sign of onycholysis (lifting of the nail).            Follow-ups after your visit        Follow-up notes from your care team     Return for Routine Visit as needed.      Your next 10 appointments already scheduled     Feb 05, 2018 10:40 AM CST   Return Visit with Kaleb Mcelroy MD   [facility] ([facility]    [facility] [address],  [address]  [place] [address]   [phone]              Who to contact     If you have questions or need follow up information about today's clinic visit or your schedule please contact [facility] directly at [phone].  Normal or non-critical lab and imaging results will be communicated to you by [employer], letter or phone within 4 business days after the clinic has received the results. If you do not hear from us within 7 days, please contact the clinic through [employer] or phone. If you have a critical or abnormal lab result, we will notify you by phone as soon as possible.  Submit refill requests through [employer] or call your pharmacy and they will forward the refill request to us. Please allow 3 business days for your refill to be completed.          Additional Information About Your Visit        [employer]CH[employer]ar[employer] Information     [employer] gives you secure access to your electronic health record. If you see a primary care provider, you can also send messages to your care team and make appointments. If you have questions, please call your primary care clinic.   "If you do not have a primary care provider, please call [phone] and they will assist you.      [employer] is an electronic gateway that provides easy, online access to your medical records. With [employer], you can request a clinic appointment, read your test results, renew a prescription or communicate with your care team.     To access your existing account, please contact your [facility] or call [phone] for assistance.        Care EveryWhere ID     This is your Care EveryWhere ID. This could be used by other organizations to access your [place] medical records  [id number]        Your Vitals Were     Pulse Height Pulse Oximetry BMI (Body Mass Index)          57 1.626 m (5' 4\") 95% 31.93 kg/m2         Blood Pressure from Last 3 Encounters:   01/09/18 124/79   11/06/17 116/78   08/14/17 130/88    Weight from Last 3 Encounters:   01/09/18 84.4 kg (186 lb)   08/14/17 82.6 kg (182 lb)   05/22/17 81.2 kg (179 lb)              Today, you had the following     No orders found for display       Primary Care Provider Office Phone # Fax #    Kaleb Mcelroy [phone]       [address] [address]        Equal Access to Services     ANKITA ELLIS : Hadii aad ku hadasho Soomaali, waaxda luqadaha, qaybta kaalmada adeegyada, waxay idiin haybyronn kailee lim . So [facility] [phone].    ATENCIÓN: Si habla español, tiene a roca disposición servicios gratuitos de asistencia lingüística. Llame al [phone].    We comply with applicable federal civil rights laws and [place] laws. We do not discriminate on the basis of race, color, national origin, age, disability, sex, sexual orientation, or gender identity.            Thank you!     Thank you for choosing [facility]  for your care. Our goal is always to provide you with excellent care. Hearing back from our patients is one way we can continue to improve our services. Please " take a few minutes to complete the written survey that you may receive in the mail after your visit with us. Thank you!             Your Updated Medication List - Protect others around you: Learn how to safely use, store and throw away your medicines at www.disposemymeds.org.          This list is accurate as of: 1/9/18 12:51 PM.  Always use your most recent med list.                   Brand Name Dispense Instructions for use Diagnosis    gabapentin 600 MG tablet    NEURONTIN    540 tablet    Take 2 tablets (1,200 mg) by mouth 3 times daily    Reflex sympathetic dystrophy       MULTIVITAMIN ADULT PO      Take by mouth daily        naloxone nasal spray    NARCAN    0.2 mL    Spray 1 spray (4 mg) into one nostril alternating nostrils as needed for opioid reversal (every 2-3 minutes until assistance arrives.)    Chronic pain syndrome       order for DME     1 each    Equipment being ordered: Scooter    Reflex sympathetic dystrophy of the lower limb       oxyCODONE IR 5 MG tablet    ROXICODONE    150 tablet    Take 1 tablet (5 mg) by mouth every 4 hours as needed for moderate to severe pain    Chronic pain syndrome       PROBIOTIC DAILY PO      Take by mouth daily        sertraline 50 MG tablet    ZOLOFT    90 tablet    Take 1 tablet (50 mg) by mouth daily    Anxiety       simvastatin 20 MG tablet    ZOCOR    90 tablet    Take 1 tablet (20 mg) by mouth At Bedtime    Hyperlipidemia LDL goal <130       valACYclovir 500 MG tablet    VALTREX    180 tablet    Take 1 tablet (500 mg) by mouth daily    Herpes simplex virus infection       zolpidem 10 MG tablet    AMBIEN    90 tablet    Take 1 tablet (10 mg) by mouth nightly as needed    Insomnia, unspecified type

## 2018-01-09 NOTE — PROGRESS NOTES
[facility] Dermatology Note      Dermatology Problem List:  1. SK    Encounter Date: Jan 9, 2018    CC:  Chief Complaint   Patient presents with     RECHECK     1 year skin check - lesion of concern on right crows feet and skin tags on right neck         History of Present Illness:  Ms. Hailee Mayo is a 62 year old female who presents as a follow-up for skin cehck. The patient was last seen 01/10/2017 when cryotherapy was done on her right temple, left thigh and back. Since her last visit, he has skin tags along her right neck that are bothersome.  The lesion her right temple also seems to be returning.    No other lesions of concern today.    Past Medical History:   Patient Active Problem List   Diagnosis     Sural neuritis     Saphenous neuritis     Abnormality of gait     Herpes simplex     Reflex sympathetic dystrophy of lower extremity     Insomnia     Plantar fasciitis     Menopausal syndrome (hot flashes)     Thyroid nodule     Anxiety     Hyperlipidemia with target LDL less than 130     Complex regional pain syndrome type 1 of left lower extremity     Gastroesophageal reflux disease without esophagitis     Chronic pain syndrome     Primary osteoarthritis of first carpometacarpal joint of right hand     Thumb pain, right     Thyroid function test abnormal     Past Medical History:   Diagnosis Date     Complex regional pain syndrome      Sleep problems      Sore throat      Trouble swallowing      Past Surgical History:   Procedure Laterality Date     ------------OTHER-------------      Surgery to lengthen Left calf muscle     BUNIONECTOMY      Both feet     COLONOSCOPY N/A 9/7/2016    Procedure: COMBINED COLONOSCOPY, SINGLE OR MULTIPLE BIOPSY/POLYPECTOMY BY BIOPSY;  Surgeon: Duane, William Charles, MD;  Location:  OR     COLONOSCOPY WITH CO2 INSUFFLATION N/A 9/7/2016    Procedure: COLONOSCOPY WITH CO2 INSUFFLATION;  Surgeon: Duane, William Charles, MD;  Location:  OR     GYN SURGERY       uterine polyp removed     LARYNGOSCOPY, ESOPHAGOSCOPY,  BIOPSY, COMBINED  5/20/2013    Procedure: COMBINED LARYNGOSCOPY, ESOPHAGOSCOPY,  BIOPSY;  Direct Laryngoscopy , Esophagoscopy;  Surgeon: Tawanda Bryson MD;  Location: [facility] OR     LUMPECTOMY BREAST BILATERAL       ORTHOPEDIC SURGERY      bunionectomy     TONSILLECTOMY         Social History:  The patient is retired, used to work as a . The patient denies use of tanning beds. The patient does not drink alcohol, smoke or use tobacco.     Family History:  There is no family history of skin cancer.    Medications:  Current Outpatient Prescriptions   Medication Sig Dispense Refill     valACYclovir (VALTREX) 500 MG tablet Take 1 tablet (500 mg) by mouth daily 180 tablet 3     oxyCODONE IR (ROXICODONE) 5 MG tablet Take 1 tablet (5 mg) by mouth every 4 hours as needed for moderate to severe pain 150 tablet 0     zolpidem (AMBIEN) 10 MG tablet Take 1 tablet (10 mg) by mouth nightly as needed 90 tablet 1     gabapentin (NEURONTIN) 600 MG tablet Take 2 tablets (1,200 mg) by mouth 3 times daily 540 tablet 3     sertraline (ZOLOFT) 50 MG tablet Take 1 tablet (50 mg) by mouth daily 90 tablet 3     naloxone (NARCAN) nasal spray Spray 1 spray (4 mg) into one nostril alternating nostrils as needed for opioid reversal (every 2-3 minutes until assistance arrives.) 0.2 mL 1     simvastatin (ZOCOR) 20 MG tablet Take 1 tablet (20 mg) by mouth At Bedtime 90 tablet 3     Multiple Vitamins-Minerals (MULTIVITAMIN ADULT PO) Take by mouth daily       Probiotic Product (PROBIOTIC DAILY PO) Take by mouth daily       ORDER FOR DME Equipment being ordered: Scooter 1 each 99       Allergies   Allergen Reactions     Penicillins      Vicodin [Hydrocodone-Acetaminophen] Other (See Comments)     Burning in abd       Review of Systems:    -Constitutional: The patient feels well otherwise.     Physical exam:  Vitals: /79 (BP Location: Left arm, Patient  "Position: Chair, Cuff Size: Adult Regular)  Pulse 57  Ht 1.626 m (5' 4\")  Wt 84.4 kg (186 lb)  SpO2 95%  BMI 31.93 kg/m2  GEN: This is a well developed, well-nourished female in no acute distress, in a pleasant mood.    SKIN: Total skin excluding the undergarment areas was performed. The exam included the head/face, neck, both arms, chest, back, abdomen, both legs, digits and/or nails. Declines genital exam  -There is a waxy stuck on tan to brown papule on the neck , trunk and extremities.  - hyperkeratosis of heels  -right temple is normal  -No other lesions of concern on areas examined.   Component      Latest Ref Rng & Units 5/24/2017   Amphetamines Qual      NEGATIVE    Barbiturates Qual Urine      NEGATIVE    Buprenorphine Qual Urine      NEGATIVE    Benzodiazepine Qual Urine      NEGATIVE    Cocaine Qual Urine      NEGATIVE    Cannabinoids Qual Urine      NEGATIVE    Methamphetamine Qual      NEGATIVE    Methadone Qual      NEGATIVE    Morphine Qual      NEGATIVE    Oxycodone Qual      NEGATIVE    Temperature of Urine was Between  Degrees F      YES    T4 Free      0.76 - 1.46 ng/dL 0.77   TSH      0.40 - 4.00 mU/L 1.63       Impression/Plan:  1. Seborrheic keratosis- neck, trunk and extremities    No further intervention required. Patient to report changes.     Declines cryo        2. Hyperkeratosis of heels    Ure 40% cream nightly     3. Brittle nails- has failed biotin, normal TSH    Avoid wet work    Gloves    Protein intake      Follow-up prn for new or changing lesions.       Staff Involved:  Scribe/Staff      Scribe Disclosure:   I, Krystin Gray, am serving as a scribe to document services personally performed by Dr. Enid Benavides, based on data collection and the provider's statements to me.     Provider Disclosure:   The documentation recorded by the scribe accurately reflects the services I personally performed and the decisions made by me.    Enid Benavides MD  Assistant " Professor  Department of Dermatology  [facility]: Phone: [phone], Fax:[phone]  [facility]: Phone: [phone], Fax: [phone]

## 2018-01-09 NOTE — NURSING NOTE
"Dermatology Rooming Note    Hailee Mayo's goals for this visit include:   Chief Complaint   Patient presents with     RECHECK     1 year skin check - lesion of concern on right crows feet and skin tags on right neck       Is a scribe okay for this visit: YES    Are records needed for this visit(If yes, obtain release of information): NO     Vitals: /79 (BP Location: Left arm, Patient Position: Chair, Cuff Size: Adult Regular)  Pulse 57  Ht 1.626 m (5' 4\")  Wt 84.4 kg (186 lb)  SpO2 95%  BMI 31.93 kg/m2    Referring Provider:  No referring provider defined for this encounter.    Krystin Gray CMA      "

## 2018-02-05 ENCOUNTER — OFFICE VISIT (OUTPATIENT)
Dept: FAMILY MEDICINE | Facility: CLINIC | Age: 63
End: 2018-02-05
Payer: MEDICARE

## 2018-02-05 VITALS
HEART RATE: 81 BPM | DIASTOLIC BLOOD PRESSURE: 83 MMHG | RESPIRATION RATE: 16 BRPM | BODY MASS INDEX: 31.96 KG/M2 | WEIGHT: 187.2 LBS | SYSTOLIC BLOOD PRESSURE: 120 MMHG | OXYGEN SATURATION: 96 % | HEIGHT: 64 IN | TEMPERATURE: 97.9 F

## 2018-02-05 DIAGNOSIS — F41.9 ANXIETY: ICD-10-CM

## 2018-02-05 DIAGNOSIS — R07.0 THROAT PAIN: ICD-10-CM

## 2018-02-05 DIAGNOSIS — Z00.00 ROUTINE HEALTH MAINTENANCE: ICD-10-CM

## 2018-02-05 DIAGNOSIS — E78.5 HYPERLIPIDEMIA WITH TARGET LDL LESS THAN 130: ICD-10-CM

## 2018-02-05 DIAGNOSIS — G89.4 CHRONIC PAIN SYNDROME: Primary | ICD-10-CM

## 2018-02-05 LAB
ALBUMIN SERPL-MCNC: 4.6 MG/DL (ref 3.5–4.7)
ALP SERPL-CCNC: 66.1 U/L (ref 31.7–110.5)
ALT SERPL-CCNC: 21.6 U/L (ref 0–45)
AMPHETAMINES QUAL: NEGATIVE
AST SERPL-CCNC: 20.9 U/L (ref 0–45)
BARBITURATES QUAL URINE: NEGATIVE
BENZODIAZEPINE QUAL URINE: NEGATIVE
BILIRUB SERPL-MCNC: <0.4 MG/DL (ref 0.2–1.3)
BUN SERPL-MCNC: 10.6 MG/DL (ref 7–19)
BUPRENORPHINE QUAL URINE: NEGATIVE
CALCIUM SERPL-MCNC: 9.5 MG/DL (ref 8.5–10.1)
CANNABINOIDS UR QL SCN: NEGATIVE
CHLORIDE SERPLBLD-SCNC: 102.5 MMOL/L (ref 98–110)
CHOLEST SERPL-MCNC: 163.4 MG/DL (ref 0–200)
CHOLEST/HDLC SERPL: 3.3 {RATIO} (ref 0–5)
CO2 SERPL-SCNC: 29.7 MMOL/L (ref 20–32)
COCAINE QUAL URINE: NEGATIVE
CREAT SERPL-MCNC: 0.7 MG/DL (ref 0.5–1)
GFR SERPL CREATININE-BSD FRML MDRD: >90 ML/MIN/1.7 M2
GLUCOSE SERPL-MCNC: 109.2 MG'DL (ref 70–99)
HCV AB SERPL QL IA: NONREACTIVE
HDLC SERPL-MCNC: 50.1 MG/DL
LDLC SERPL CALC-MCNC: 77 MG/DL (ref 0–129)
METHAMPHETAMINE: NEGATIVE
METHODONE QUAL: NEGATIVE
MORPHINE QUAL: NEGATIVE
OXYCODONE QUAL: POSITIVE
POTASSIUM SERPL-SCNC: 4 MMOL/DL (ref 3.3–4.5)
PROT SERPL-MCNC: 8.2 G/DL (ref 6.8–8.8)
S PYO AG THROAT QL IA.RAPID: NEGATIVE
SODIUM SERPL-SCNC: 139.6 MMOL/L (ref 132.6–141.4)
TEMPERATURE OF URINE WAS BETWEEN 90-100 DEGREES F: YES
TRIGL SERPL-MCNC: 182.6 MG/DL (ref 0–150)
VLDL CHOLESTEROL: 36.5 MG/DL (ref 7–32)

## 2018-02-05 RX ORDER — ZOLPIDEM TARTRATE 10 MG/1
TABLET ORAL
COMMUNITY
End: 2018-02-05

## 2018-02-05 RX ORDER — OXYCODONE HYDROCHLORIDE 5 MG/1
5 TABLET ORAL EVERY 4 HOURS PRN
Qty: 150 TABLET | Refills: 0 | Status: SHIPPED | OUTPATIENT
Start: 2018-02-05 | End: 2018-02-05

## 2018-02-05 RX ORDER — OXYCODONE HYDROCHLORIDE 5 MG/1
5 TABLET ORAL EVERY 4 HOURS PRN
Qty: 150 TABLET | Refills: 0 | Status: SHIPPED | OUTPATIENT
Start: 2018-03-05 | End: 2018-02-05

## 2018-02-05 RX ORDER — OXYCODONE HYDROCHLORIDE 5 MG/1
5 TABLET ORAL EVERY 4 HOURS PRN
Qty: 150 TABLET | Refills: 0 | Status: SHIPPED | OUTPATIENT
Start: 2018-04-05 | End: 2018-04-30

## 2018-02-05 ASSESSMENT — ANXIETY QUESTIONNAIRES
5. BEING SO RESTLESS THAT IT IS HARD TO SIT STILL: NOT AT ALL
2. NOT BEING ABLE TO STOP OR CONTROL WORRYING: NOT AT ALL
7. FEELING AFRAID AS IF SOMETHING AWFUL MIGHT HAPPEN: NOT AT ALL
6. BECOMING EASILY ANNOYED OR IRRITABLE: NOT AT ALL
3. WORRYING TOO MUCH ABOUT DIFFERENT THINGS: NOT AT ALL
1. FEELING NERVOUS, ANXIOUS, OR ON EDGE: NOT AT ALL
GAD7 TOTAL SCORE: 0
IF YOU CHECKED OFF ANY PROBLEMS ON THIS QUESTIONNAIRE, HOW DIFFICULT HAVE THESE PROBLEMS MADE IT FOR YOU TO DO YOUR WORK, TAKE CARE OF THINGS AT HOME, OR GET ALONG WITH OTHER PEOPLE: NOT DIFFICULT AT ALL

## 2018-02-05 ASSESSMENT — PATIENT HEALTH QUESTIONNAIRE - PHQ9: 5. POOR APPETITE OR OVEREATING: NOT AT ALL

## 2018-02-05 NOTE — MR AVS SNAPSHOT
After Visit Summary   2/5/2018    Hailee Mayo    MRN: [id number]           Patient Information     Date Of Birth          1955        Visit Information        Provider Department      2/5/2018 10:40 AM Kaleb Mcelroy MD [facility]        Today's Diagnoses     Chronic pain syndrome    -  1    Anxiety        Routine health maintenance        Throat pain        Hyperlipidemia with target LDL less than 130           Follow-ups after your visit        Who to contact     Please call your clinic at [phone] to:    Ask questions about your health    Make or cancel appointments    Discuss your medicines    Learn about your test results    Speak to your doctor   If you have compliments or concerns about an experience at your clinic, or if you wish to file a complaint, please contact [facility] Patient Relations at [phone] or email us at [email or web address]         Additional Information About Your Visit        MyChart Information     [employer] gives you secure access to your electronic health record. If you see a primary care provider, you can also send messages to your care team and make appointments. If you have questions, please call your primary care clinic.  If you do not have a primary care provider, please call [phone] and they will assist you.      [employer] is an electronic gateway that provides easy, online access to your medical records. With [employer], you can request a clinic appointment, read your test results, renew a prescription or communicate with your care team.     To access your existing account, please contact your [facility] or call [phone] for assistance.        Care EveryWhere ID     This is your Care EveryWhere ID. This could be used by other organizations to access your [place] medical records  [id number]        Your Vitals Were     Pulse Temperature Respirations Height  "Pulse Oximetry Breastfeeding?    81 97.9  F (36.6  C) (Oral) 16 1.626 m (5' 4\") 96% No    BMI (Body Mass Index)                   32.13 kg/m2            Blood Pressure from Last 3 Encounters:   02/05/18 120/83   01/09/18 124/79   11/06/17 116/78    Weight from Last 3 Encounters:   02/05/18 84.9 kg (187 lb 3.2 oz)   01/09/18 84.4 kg (186 lb)   08/14/17 82.6 kg (182 lb)              We Performed the Following     Chlamydia trachomatis PCR     Comprehensive Metabolic Panel ([facility])     Hepatitis C antibody     HPV High Risk Types DNA Cervical     Lipid Cascade ([facility])     Neisseria gonorrhoeae PCR     Pap imaged thin layer screen with HPV - recommended age 30 - 65 years (select HPV order below)     Rapid Urine Drug Screen ([facility])     Strep Culture ([facility])     Strep Screen Rapid (Group) ([facility])          Today's Medication Changes          These changes are accurate as of 2/5/18  3:47 PM.  If you have any questions, ask your nurse or doctor.               Start taking these medicines.        Dose/Directions    oxyCODONE IR 5 MG tablet   Commonly known as:  ROXICODONE   Used for:  Chronic pain syndrome   Started by:  Kaleb Mcelroy MD        Dose:  5 mg   Start taking on:  4/5/2018   Take 1 tablet (5 mg) by mouth every 4 hours as needed for moderate to severe pain   Quantity:  150 tablet   Refills:  0         These medicines have changed or have updated prescriptions.        Dose/Directions    zolpidem 10 MG tablet   Commonly known as:  AMBIEN   This may have changed:  Another medication with the same name was removed. Continue taking this medication, and follow the directions you see here.   Used for:  Insomnia, unspecified type   Changed by:  Kaleb Mcelroy MD        Dose:  10 mg   Take 1 tablet (10 mg) by mouth nightly as needed   Quantity:  90 tablet   Refills:  1            Where to get your medicines      Some of these will need a paper prescription and others can be bought over the counter.  Ask your " nurse if you have questions.     Bring a paper prescription for each of these medications     oxyCODONE IR 5 MG tablet                Primary Care Provider Office Phone # Fax #    Kaleb Mcelroy [phone]       [address] [address]        Equal Access to Services     ANKITA ELLIS : Ida jones hadkayyo Soomaali, waaxda luqadaha, qaybta kaalmada adeegyadelfina, katty guallpa. So [facility] [phone].    ATENCIÓN: Si habla español, tiene a roca disposición servicios gratuitos de asistencia lingüística. [facility] [phone].    We comply with applicable federal civil rights laws and [place] laws. We do not discriminate on the basis of race, color, national origin, age, disability, sex, sexual orientation, or gender identity.            Thank you!     Thank you for choosing \[facility]\"" [facility]  for your care. Our goal is always to provide you with excellent care. Hearing back from our patients is one way we can continue to improve our services. Please take a few minutes to complete the written survey that you may receive in the mail after your visit with us. Thank you!             Your Updated Medication List - Protect others around you: Learn how to safely use, store and throw away your medicines at www.disposemymeds.org.          This list is accurate as of 2/5/18  3:47 PM.  Always use your most recent med list.                   Brand Name Dispense Instructions for use Diagnosis    gabapentin 600 MG tablet    NEURONTIN    540 tablet    Take 2 tablets (1,200 mg) by mouth 3 times daily    Reflex sympathetic dystrophy       MULTIVITAMIN ADULT PO      Take by mouth daily        naloxone nasal spray    NARCAN    0.2 mL    Spray 1 spray (4 mg) into one nostril alternating nostrils as needed for opioid reversal (every 2-3 minutes until assistance arrives.)    Chronic pain syndrome       order for DME     1 each    Equipment being ordered:  Rodriguez    Reflex sympathetic dystrophy of the lower limb       oxyCODONE IR 5 MG tablet   Start taking on:  4/5/2018    ROXICODONE    150 tablet    Take 1 tablet (5 mg) by mouth every 4 hours as needed for moderate to severe pain    Chronic pain syndrome       PROBIOTIC DAILY PO      Take by mouth daily        sertraline 50 MG tablet    ZOLOFT    90 tablet    Take 1 tablet (50 mg) by mouth daily    Anxiety       simvastatin 20 MG tablet    ZOCOR    90 tablet    Take 1 tablet (20 mg) by mouth At Bedtime    Hyperlipidemia LDL goal <130       valACYclovir 500 MG tablet    VALTREX    180 tablet    Take 1 tablet (500 mg) by mouth daily    Herpes simplex virus infection       zolpidem 10 MG tablet    AMBIEN    90 tablet    Take 1 tablet (10 mg) by mouth nightly as needed    Insomnia, unspecified type

## 2018-02-05 NOTE — PROGRESS NOTES
"      HPI:       Hailee Mayo is a 62 year old who presents for the following  Patient presents with:  RECHECK: Medication Refill and Reoccuring sore throat        Chronic Pain Follow-Up Visit     Location of pain: Hip and Leg  Analgesia/pain control:         Recent changes:  same        Overall control:Tolerable with discomfort    Care Plan    Chronic Pain Care Plan completed Yes    Are you able to follow the care plan? Yes    Activity level/function:    What does the pain stop you from doing? Most things  Functional Assessment  Questionnaire -5  FUNCTIONAL ASSESSMENT QUESTIONNAIRE SCORE 11/9/2017 2/5/2018   Total Score 55 55        Current morphine equivalents/day = 37.5 mg    Naloxone WILL NOT be prescribed.  Adverse effects: No     Adherence    How often do you take extra pain medicine Never    Did you take your pain medication today? YES    Home Exercise? 2-3 days/week for an average of 30-45 minutes  Other treatments         Counseling ? On SSRI         Physical therapy? Completed. Exercise program         Database checked today? Yes. Details: expected  Urine toxicology testing: expected      Opioid Risk Tool Score:  OPIOID RISK TOOL TOTAL SCORE 8/14/2017   Total Score 3                2. Sore throat  L side  No exudates    3. PAP  Due for PAP    4.           Adherence and Exercise  Medication side effects: no  How often is a medication missed? Never  Exercise:strength training 2-3 days/week for an average of 45-60 minutes        Problem, Medication and Allergy Lists were reviewed and are current.  Patient is an established patient of this clinic.         Review of Systems:   Review of Systems   Negative ROS except as noted above in HPI         Physical Exam:     Patient Vitals for the past 24 hrs:   BP Temp Temp src Pulse Resp SpO2 Height Weight   02/05/18 1051 120/83 97.9  F (36.6  C) Oral 81 16 96 % 1.626 m (5' 4\") 84.9 kg (187 lb 3.2 oz)        Physical Exam  Gen: no distress  Ext: no edema, no " redness, no deformity  GYN: EGBUS- neg, Vag- neg, Cervix - normal, Adenexa - neg/neg, RV-deferred          Results:     Results for orders placed or performed in visit on 02/05/18   Rapid Urine Drug Screen ([facility])   Result Value Ref Range    Amphetamines Qual NEGATIVE NEGATIVE    Barbiturates Qual Urine NEGATIVE NEGATIVE    Buprenorphine Qual Urine NEGATIVE NEGATIVE    Benzodiazepine Qual Urine NEGATIVE NEGATIVE    Cocaine Qual Urine NEGATIVE NEGATIVE    Cannabinoids Qual Urine NEGATIVE NEGATIVE    Methamphetamine Qual NEGATIVE NEGATIVE    Methadone Qual NEGATIVE NEGATIVE    Morphine Qual NEGATIVE NEGATIVE    Oxycodone Qual POSITIVE (A) NEGATIVE    Temperature of Urine was Between  Degrees F YES YES   Strep Screen Rapid (Group) ([facility])   Result Value Ref Range    Rapid Strep A Screen NEGATIVE Negative   Lipid [place] ([facility])   Result Value Ref Range    Cholesterol 163.4 0.0 - 200.0 mg/dL    Cholesterol/HDL Ratio 3.3 0.0 - 5.0    HDL Cholesterol 50.1 >40.0 mg/dL    LDL Cholesterol Calculated 77 0 - 129 mg/dL    Triglycerides 182.6 (H) 0.0 - 150.0 mg/dL    VLDL Cholesterol 36.5 (H) 7.0 - 32.0 mg/dL   Comprehensive Metabolic Panel ([facility])   Result Value Ref Range    Albumin 4.6 3.5 - 4.7 mg/dL    Alkaline Phosphatase 66.1 31.7 - 110.5 U/L    ALT 21.6 0.0 - 45.0 U/L    AST 20.9 0.0 - 45.0 U/L    Bilirubin Total <0.4 0.2 - 1.3 mg/dL    Urea Nitrogen 10.6 7.0 - 19.0 mg/dL    Calcium 9.5 8.5 - 10.1 mg/dL    Chloride 102.5 98.0 - 110.0 mmol/L    Carbon Dioxide 29.7 20.0 - 32.0 mmol/L    Creatinine 0.7 0.5 - 1.0 mg/dL    Glucose 109.2 (H) 70.0 - 99.0 mg'dL    Potassium 4.0 3.3 - 4.5 mmol/dL    Sodium 139.6 132.6 - 141.4 mmol/L    Protein Total 8.2 6.8 - 8.8 g/dL    GFR Estimate >90 >60.0 mL/min/1.7 m2    GFR Estimate If Black >90 >60.0 mL/min/1.7 m2       Assessment and Plan     1. Chronic pain syndrome  Utox - expected   - expected  FAQ - 55  Care Plan - done  Naloxone - yes    - oxyCODONE IR (ROXICODONE)  5 MG tablet; Take 1 tablet (5 mg) by mouth every 4 hours as needed for moderate to severe pain  Dispense: 150 tablet; Refill: 0  - Rapid Urine Drug Screen ([facility])      2.  Routine health maintenance  - Pap imaged thin layer screen with HPV - recommended age 30 - 65 years (select HPV order below)  - HPV High Risk Types DNA Cervical  - Chlamydia trachomatis PCR  - Neisseria gonorrhoeae PCR  - Hepatitis C antibody    3. Throat pain  Strep - neg  - Strep Screen Rapid (Group) ([facility])  - Strep Culture ([facility])    5. Hyperlipidemia with target LDL less than 130  Excellent labs  - Lipid [place] ([facility])  - Comprehensive Metabolic Panel ([facility])        Options for treatment and follow-up care were reviewed with the patient. Hailee Mayo  engaged in the decision making process and verbalized understanding of the options discussed and agreed with the final plan.    Kaleb Mcelroy MD    40min spent with the patient, >50% in arranging care for chronic pain, PAP, lipids, sore throat,   PHarper

## 2018-02-06 LAB
C TRACH DNA SPEC QL NAA+PROBE: NEGATIVE
N GONORRHOEA DNA SPEC QL NAA+PROBE: NEGATIVE
SPECIMEN SOURCE: NORMAL
SPECIMEN SOURCE: NORMAL

## 2018-02-06 ASSESSMENT — ANXIETY QUESTIONNAIRES: GAD7 TOTAL SCORE: 0

## 2018-02-06 ASSESSMENT — PATIENT HEALTH QUESTIONNAIRE - PHQ9: SUM OF ALL RESPONSES TO PHQ QUESTIONS 1-9: 3

## 2018-02-07 LAB
BACTERIA SPEC CULT: NORMAL
COPATH REPORT: NORMAL
PAP: NORMAL
SPECIMEN SOURCE: NORMAL

## 2018-02-08 DIAGNOSIS — G47.00 INSOMNIA, UNSPECIFIED TYPE: ICD-10-CM

## 2018-02-08 RX ORDER — ZOLPIDEM TARTRATE 10 MG/1
10 TABLET ORAL
Qty: 90 TABLET | Refills: 1 | Status: SHIPPED | OUTPATIENT
Start: 2018-02-08 | End: 2018-07-23

## 2018-02-08 NOTE — TELEPHONE ENCOUNTER
Request for medication refill:    Date of last visit at clinic: 2-5-18    Please complete refill if appropriate and CLOSE ENCOUNTER.    Closing the encounter signifies the refill is complete.    If refill has been denied, please complete the smart phrase .smirefuse and route it to the [facility] RN TRIAGE pool to inform the patient and the pharmacy.    Catalina Molina, CMA

## 2018-02-09 LAB
FINAL DIAGNOSIS: NORMAL
HPV HR 12 DNA CVX QL NAA+PROBE: NEGATIVE
HPV16 DNA SPEC QL NAA+PROBE: NEGATIVE
HPV18 DNA SPEC QL NAA+PROBE: NEGATIVE
SPECIMEN DESCRIPTION: NORMAL
SPECIMEN SOURCE CVX/VAG CYTO: NORMAL

## 2018-04-25 ENCOUNTER — TELEPHONE (OUTPATIENT)
Dept: FAMILY MEDICINE | Facility: CLINIC | Age: 63
End: 2018-04-25

## 2018-04-25 NOTE — TELEPHONE ENCOUNTER
[facility] phone call message-patient reporting a symptom:     Symptom: Patient was bit by her outdoor cat last night. When to urgent care to have the wound cleaned, but noticed today that the area is swollen and warm to the touch. She is wondering if she should be concerned. Suggested patient should be seen in clinic to assess for infection, but patient states she is unable to come to clinic.    Same Day Visit Offered: Yes, declined    Additional comments: Patient is scheduled for Monday 4/30/2018 with PCP    OK to leave message on voice mail? Yes    Primary language: English      needed? No    Call taken on April 25, 2018 at 8:17 AM by Israel Gage

## 2018-04-25 NOTE — TELEPHONE ENCOUNTER
RN returned call to patient to discuss symptoms.    Patient reports her cat bit her yesterday evening on her hand above the wrist. States it started to bleed a lot. Reports four puncture holes. Went to Urgent care. Wound was cleaned, wrapped, and patient prescribed 10 days Doxycycline 100 mg with instructions to take twice daily for 10 days.     Patient is calling because she does not some swelling at the site on the top of her hand and between her knuckles and wrist. States her hand is warm but not hot to touch. States it does hurt a little but taking Tylenol. Denies any drainage or foul odor. Denies fever.    RN discussed with patient signs and symptoms of infection. Also discussed when to call back to the clinic if symptoms change prior to upcoming appointment. Patient verbalized understanding and agreement with plan.    Jyothi Vazquez RN

## 2018-04-27 ENCOUNTER — TELEPHONE (OUTPATIENT)
Dept: FAMILY MEDICINE | Facility: CLINIC | Age: 63
End: 2018-04-27

## 2018-04-27 NOTE — TELEPHONE ENCOUNTER
[facility] phone call message-patient reporting a symptom:     Symptom: Cat bite on hand     Same Day Visit Offered: Yes, declined    Additional comments: Patient stated that they went to the er and received a prescription on Wednesday. Patient is wondering if she should be seeing improvement in the infection by now as she stated it does not look like it is getting better.    OK to leave message on voice mail? Yes    Primary language: English      needed? No    Call taken on April 27, 2018 at 9:32 AM by Fatmata Guajardo

## 2018-04-27 NOTE — TELEPHONE ENCOUNTER
Deep bite  Not improving.  Discussed situation    Plan  Go to [facility] ER  Possible admission    PHarper

## 2018-04-30 ENCOUNTER — OFFICE VISIT (OUTPATIENT)
Dept: FAMILY MEDICINE | Facility: CLINIC | Age: 63
End: 2018-04-30
Payer: MEDICARE

## 2018-04-30 VITALS
OXYGEN SATURATION: 98 % | HEART RATE: 75 BPM | BODY MASS INDEX: 31.55 KG/M2 | TEMPERATURE: 97.6 F | WEIGHT: 183.8 LBS | DIASTOLIC BLOOD PRESSURE: 82 MMHG | SYSTOLIC BLOOD PRESSURE: 138 MMHG | RESPIRATION RATE: 16 BRPM

## 2018-04-30 DIAGNOSIS — L03.113 CELLULITIS OF RIGHT UPPER EXTREMITY: Primary | ICD-10-CM

## 2018-04-30 DIAGNOSIS — G89.4 CHRONIC PAIN SYNDROME: ICD-10-CM

## 2018-04-30 RX ORDER — OXYCODONE HYDROCHLORIDE 5 MG/1
5 TABLET ORAL EVERY 4 HOURS PRN
Qty: 150 TABLET | Refills: 0 | Status: SHIPPED | OUTPATIENT
Start: 2018-04-30 | End: 2018-04-30

## 2018-04-30 RX ORDER — LEVOFLOXACIN 750 MG/1
750 TABLET, FILM COATED ORAL
COMMUNITY
Start: 2018-04-26 | End: 2018-04-30

## 2018-04-30 RX ORDER — CEFDINIR 250 MG/5ML
300 POWDER, FOR SUSPENSION ORAL DAILY
COMMUNITY
End: 2018-07-23

## 2018-04-30 RX ORDER — OXYCODONE HYDROCHLORIDE 5 MG/1
5 TABLET ORAL EVERY 4 HOURS PRN
Qty: 150 TABLET | Refills: 0 | Status: SHIPPED | OUTPATIENT
Start: 2018-05-30 | End: 2018-04-30

## 2018-04-30 RX ORDER — OXYCODONE HYDROCHLORIDE 5 MG/1
5 TABLET ORAL EVERY 4 HOURS PRN
Qty: 150 TABLET | Refills: 0 | Status: SHIPPED | OUTPATIENT
Start: 2018-06-30 | End: 2018-07-23

## 2018-04-30 RX ORDER — DOXYCYCLINE 100 MG/1
100 CAPSULE ORAL
COMMUNITY
Start: 2018-04-24 | End: 2018-04-30

## 2018-04-30 RX ORDER — METRONIDAZOLE 500 MG/1
500 TABLET ORAL
COMMUNITY
Start: 2018-04-26 | End: 2018-07-23

## 2018-04-30 NOTE — MR AVS SNAPSHOT
After Visit Summary   4/30/2018    Hailee Mayo    MRN: [id number]           Patient Information     Date Of Birth          1955        Visit Information        Provider Department      4/30/2018 10:40 AM Kaleb Mcelroy MD [facility]        Today's Diagnoses     Chronic pain syndrome    -  1    Cellulitis of right upper extremity          Care Instructions            Personal Care Plan for Chronic Pain     1.  Personal Goals:  Engaging and spending time with my grandkids as much as I can, getting to my water aerobics and spending that time with the others int he class and my mom, spending time with my mom and my sister, feel like I am still of use in this world, that I am important to my grandma, a good partner to my , to stay in my house and keep it clean        2.  Sleep:                          *  Basic sleep plan:                                              *reduce or eliminate caffeine and daytime naps                                              * relaxation before bed                                              * limit screen time 1-2 hours prior to bed                                              * establish dark/quiet sleep environment                        *  Nighttime medications including the following: zolpidem     3.  Physical Activity:                          * Home/community based activity:                                              * Yoga dvd 1x/week                                              * water aerobics 2x/week:                          * Listen to your body.  Pace yourself for success.  Don't over-do it.  Don't under do it.                        * Balance activities with rest and set realistic goals.      4.  Nutrition/Weight:                          * Try to eat at least 5 servings of fresh fruits and vegetables each day.                        * Limit processed foods and foods high in sugar, sodium and fat.                         * Maintain a healthy weight.      5.  Mood/Stress Management:                         * Listening to healing cd for relaxation and pain management - can use for acute pain, but it's also helpful to do this daily to help retrain brain                        * Listening Danny music                        * Taking sertraline medication daily     6.  Pain:                          * Non-medication treatments:                                              * light massage as tolerated     7.  Pain Medications: oxycodone as prescribed     No follow up with behavioral health planned at this time          JEAN CLAUDE Villarreal, GORAN Rodriguez, MANOJ Parrish, DENYS Acosta., JIMY New., & ADRIANA Mcgovern. Cognitive behavioral therapy for chronic pain among veterans: Therapist manual. [place]: [place] [facility].          JEAN CLAUDE Villarreal McKellar, J.D., TYLER Parrish., DENYS Acosta., JIMY New., & Shaniqua, B.E. Cognitive behavioral therapy for chronic pain among veterans: Therapist manual. [place], [place]: .S. [facility].              Follow-ups after your visit        Who to contact     Please call your clinic at [phone] to:    Ask questions about your health    Make or cancel appointments    Discuss your medicines    Learn about your test results    Speak to your doctor            Additional Information About Your Visit        [employer] Information     [employer] gives you secure access to your electronic health record. If you see a primary care provider, you can also send messages to your care team and make appointments. If you have questions, please call your primary care clinic.  If you do not have a primary care provider, please call [phone] and they will assist you.      [employer] is an electronic gateway that provides easy, online access to your medical records. With [employer], you can request a clinic appointment, read your test results, renew a prescription or communicate with your care  team.     To access your existing account, please contact your [facility] or call [phone] for assistance.        Care EveryWhere ID     This is your Care EveryWhere ID. This could be used by other organizations to access your [place] medical records  [id number]        Your Vitals Were     Pulse Temperature Respirations Pulse Oximetry BMI (Body Mass Index)       75 97.6  F (36.4  C) (Oral) 16 98% 31.55 kg/m2        Blood Pressure from Last 3 Encounters:   04/30/18 138/82   02/05/18 120/83   01/09/18 124/79    Weight from Last 3 Encounters:   04/30/18 183 lb 12.8 oz (83.4 kg)   02/05/18 187 lb 3.2 oz (84.9 kg)   01/09/18 186 lb (84.4 kg)              We Performed the Following     Rapid Urine Drug Screen ([facility])          Today's Medication Changes          These changes are accurate as of 4/30/18 11:24 AM.  If you have any questions, ask your nurse or doctor.               Start taking these medicines.        Dose/Directions    oxyCODONE IR 5 MG tablet   Commonly known as:  ROXICODONE   Used for:  Chronic pain syndrome   Started by:  Kaleb Mcelroy MD        Dose:  5 mg   Start taking on:  6/30/2018   Take 1 tablet (5 mg) by mouth every 4 hours as needed for moderate to severe pain   Quantity:  150 tablet   Refills:  0         Stop taking these medicines if you haven't already. Please contact your care team if you have questions.     doxycycline 100 MG capsule   Commonly known as:  VIBRAMYCIN   Stopped by:  Kaleb Mcelroy MD           levofloxacin 750 MG tablet   Commonly known as:  LEVAQUIN   Stopped by:  Kaleb Mcelroy MD                Where to get your medicines      Some of these will need a paper prescription and others can be bought over the counter.  Ask your nurse if you have questions.     Bring a paper prescription for each of these medications     oxyCODONE IR 5 MG tablet               Information about OPIOIDS     PRESCRIPTION OPIOIDS: WHAT YOU NEED TO KNOW    You have a prescription for an opioid (narcotic) pain medicine. Opioids can cause addiction. If you have a history of chemical dependency of any type, you are at a higher risk of becoming addicted to opioids. Only take this medicine after all other options have been tried. Take it for as short a time and as few doses as possible.     Do not:    Drive. If you drive while taking these medicines, you could be arrested for driving under the influence (DUI).    Operate heavy machinery    Do any other dangerous activities while taking these medicines.     Drink any alcohol while taking these medicines.      Take with any other medicines that contain acetaminophen. Read all labels carefully. Look for the word  acetaminophen  or  Tylenol.  Ask your pharmacist if you have questions or are unsure.    Store your pills in a secure place, locked if possible. We will not replace any lost or stolen medicine. If you don t finish your medicine, please throw away (dispose) as directed by your pharmacist. The [place] Pollution Control Agency has more information about safe disposal: https://www.pca.[facility].[place].us/living-green/managing-unwanted-medications    All opioids tend to cause constipation. Drink plenty of water and eat foods that have a lot of fiber, such as fruits, vegetables, prune juice, apple juice and high-fiber cereal. Take a laxative (Miralax, milk of magnesia, Colace, Senna) if you don t move your bowels at least every other day.          Primary Care Provider Office Phone # Fax #    Kaleb Mcelroy [phone]       [address] [address]        Equal Access to Services     ANKITA ELLIS : Hadii aad ku hadasho Solorena, waaxda luqadaha, qaybta kaalmada leon, katty lim . So [facility] [phone].    ATENCIÓN: Si habla español, tiene a roca disposición servicios gratuitos de asistencia lingüística. Llame al [phone].    We comply with  applicable federal civil rights laws and [place] laws. We do not discriminate on the basis of race, color, national origin, age, disability, sex, sexual orientation, or gender identity.            Thank you!     Thank you for choosing [facility]  for your care. Our goal is always to provide you with excellent care. Hearing back from our patients is one way we can continue to improve our services. Please take a few minutes to complete the written survey that you may receive in the mail after your visit with us. Thank you!             Your Updated Medication List - Protect others around you: Learn how to safely use, store and throw away your medicines at www.disposemymeds.org.          This list is accurate as of 4/30/18 11:24 AM.  Always use your most recent med list.                   Brand Name Dispense Instructions for use Diagnosis    cefdinir 250 MG/5ML suspension    OMNICEF     Take 300 mg/kg/day by mouth daily    Cellulitis of right upper extremity       gabapentin 600 MG tablet    NEURONTIN    540 tablet    Take 2 tablets (1,200 mg) by mouth 3 times daily    Reflex sympathetic dystrophy       metroNIDAZOLE 500 MG tablet    FLAGYL     Take 500 mg by mouth    Cellulitis of right upper extremity       MULTIVITAMIN ADULT PO      Take by mouth daily        naloxone nasal spray    NARCAN    0.2 mL    Spray 1 spray (4 mg) into one nostril alternating nostrils as needed for opioid reversal (every 2-3 minutes until assistance arrives.)    Chronic pain syndrome       order for DME     1 each    Equipment being ordered: Scooter    Reflex sympathetic dystrophy of the lower limb       oxyCODONE IR 5 MG tablet   Start taking on:  6/30/2018    ROXICODONE    150 tablet    Take 1 tablet (5 mg) by mouth every 4 hours as needed for moderate to severe pain    Chronic pain syndrome       PROBIOTIC DAILY PO      Take by mouth daily        sertraline 50 MG tablet    ZOLOFT    90 tablet    Take 1 tablet (50  mg) by mouth daily    Anxiety       simvastatin 20 MG tablet    ZOCOR    90 tablet    Take 1 tablet (20 mg) by mouth At Bedtime    Hyperlipidemia LDL goal <130       valACYclovir 500 MG tablet    VALTREX    180 tablet    Take 1 tablet (500 mg) by mouth daily    Herpes simplex virus infection       zolpidem 10 MG tablet    AMBIEN    90 tablet    Take 1 tablet (10 mg) by mouth nightly as needed    Insomnia, unspecified type

## 2018-04-30 NOTE — PATIENT INSTRUCTIONS
Personal Care Plan for Chronic Pain     1.  Personal Goals:  Engaging and spending time with my grandkids as much as I can, getting to my water aerobics and spending that time with the others int he class and my mom, spending time with my mom and my sister, feel like I am still of use in this world, that I am important to my grandma, a good partner to my , to stay in my house and keep it clean        2.  Sleep:                          *  Basic sleep plan:                                              *reduce or eliminate caffeine and daytime naps                                              * relaxation before bed                                              * limit screen time 1-2 hours prior to bed                                              * establish dark/quiet sleep environment                        *  Nighttime medications including the following: zolpidem     3.  Physical Activity:                          * Home/community based activity:                                              * Yoga dvd 1x/week                                              * water aerobics 2x/week:                          * Listen to your body.  Pace yourself for success.  Don't over-do it.  Don't under do it.                        * Balance activities with rest and set realistic goals.      4.  Nutrition/Weight:                          * Try to eat at least 5 servings of fresh fruits and vegetables each day.                        * Limit processed foods and foods high in sugar, sodium and fat.                        * Maintain a healthy weight.      5.  Mood/Stress Management:                         * Listening to healing cd for relaxation and pain management - can use for acute pain, but it's also helpful to do this daily to help retrain brain                        * Listening [place] music                        * Taking sertraline medication daily     6.  Pain:                          * Non-medication  treatments:                                              * light massage as tolerated     7.  Pain Medications: oxycodone as prescribed     No follow up with behavioral health planned at this time          JEAN CLAUDE Villarreal, GORAN Rodriguez, MANOJ Parrish, DENYS Acosta., JIMY New., & ADRIANA Mcgovern. Cognitive behavioral therapy for chronic pain among veterans: Therapist manual. [place]: [place] [facility].          JEAN CLAUDE Villarreal, GORAN Rodriguez, MANOJ Parrish, DENYS Acosta., JIMY New., & ADRIANA Mcgovern. Cognitive behavioral therapy for chronic pain among veterans: Therapist manual. [place]: [employer].

## 2018-04-30 NOTE — PROGRESS NOTES
Hospitalization Follow-up Visit         [facility]       Hospital Follow-up Visit:    Hospital:  [facility]   Date of Admission: 4/27  Date of Discharge: 4/29  Reason(s) for Admission: Cat bite - Cellulitis R Wrist            Problems taking medications regularly:  None       Post Discharge Medication Reconciliation: discharge medications reconciled, continue medications without change.       Problems adhering to non-medication therapy:  None       Medications reviewed by: by myself.     Summary of hospitalization:  AVS reviewed with meds  Diagnostic Tests/Treatments reviewed.  Follow up needed: none  Other Healthcare Providers Involved in Patient s Care:         None  Update since discharge: improved.   Plan of care communicated with patient and family                       Review of Systems:   CONSTITUTIONAL: no fatigue, no unexpected change in weight  SKIN: no worrisome rashes, no worrisome moles, no worrisome lesions  EYES: no acute vision problems or changes  ENT: no ear problems, no mouth problems, no throat problems  RESP: no significant cough, no shortness of breath  CV: no chest pain, no palpitations, no new or worsening peripheral edema  GI: no nausea, no vomiting, no constipation, no diarrhea  EXT:problem with bush's neuroma x 1, cat bite improved with hospitalization, chronic pain stable              Physical Exam:     Vitals:    04/30/18 1040   BP: 138/82   Pulse: 75   Resp: 16   Temp: 97.6  F (36.4  C)   TempSrc: Oral   SpO2: 98%   Weight: 183 lb 12.8 oz (83.4 kg)     Body mass index is 31.55 kg/(m^2).    Gen: no distress  Lungs: clear  Cor: RRR, no S3 S4 murmur or rub  Abd: soft, nontender, no HSM  Ext: R wrist - swollen around 2 puncture wounds, mild tenderness by distal wound, able to move all fingers without pain, Swelling/redness much improved compared to picture from before treatment    Ext: R foot - space between 2-3rd toes. No tenderness. No tenderness in 4-5th web space for bush  neuroma           Results:     Results for orders placed or performed in visit on 04/30/18   Rapid Urine Drug Screen ([facility])   Result Value Ref Range    Amphetamines Qual NEGATIVE NEGATIVE    Barbiturates Qual Urine NEGATIVE NEGATIVE    Buprenorphine Qual Urine NEGATIVE NEGATIVE    Benzodiazepine Qual Urine NEGATIVE NEGATIVE    Cocaine Qual Urine NEGATIVE NEGATIVE    Cannabinoids Qual Urine NEGATIVE NEGATIVE    Methamphetamine Qual NEGATIVE NEGATIVE    Methadone Qual NEGATIVE NEGATIVE    Morphine Qual NEGATIVE NEGATIVE    Oxycodone Qual POSITIVE (A) NEGATIVE    Temperature of Urine was Between  Degrees F YES YES         Assessment and Plan      1. Cellulitis of right upper extremity  F/u hospitalization for cellulitis d/t cat bite  Improved with IV antibiotics. Now on orals.  Swelling improved, function improved  Finish antibiotics. If further swelling, restart antibiotics    - metroNIDAZOLE (FLAGYL) 500 MG tablet; Take 500 mg by mouth 3 times daily  - cefdinir (OMNICEF) 250 MG/5ML suspension; Take 300 mg/kg/day by mouth daily    2. Chronic pain syndrome  Utox - expected   - expected  FAQ - 52.5  Care Plan - yes - Reviewed Personal care plan  Naloxone - yes    Discussed medical cannabis.  Pt interested. Discussed goal of 50% decrease in opiates with [facility]  Will contact her when certifier available at [facility]    Refill opiates x 3 months      - Rapid Urine Drug Screen ([facility])  - oxyCODONE IR (ROXICODONE) 5 MG tablet; Take 1 tablet (5 mg) by mouth every 4 hours as needed for moderate to severe pain  Dispense: 150 tablet; Refill: 0          E&M code to be billed if TCM cannot be: 53635  Type of decision making: Moderate complexity (57392)      Options for treatment and follow-up care were reviewed with the patient  Hailee Mayo   engaged in the decision making process and verbalized understanding of the options discussed and agreed with the final plan.      Kaleb Mcelroy MD

## 2018-05-19 ENCOUNTER — TELEPHONE (OUTPATIENT)
Dept: FAMILY MEDICINE | Facility: CLINIC | Age: 63
End: 2018-05-19

## 2018-05-19 DIAGNOSIS — R30.0 DYSURIA: Primary | ICD-10-CM

## 2018-05-19 RX ORDER — SULFAMETHOXAZOLE/TRIMETHOPRIM 800-160 MG
1 TABLET ORAL 2 TIMES DAILY
Qty: 6 TABLET | Refills: 0 | Status: SHIPPED | OUTPATIENT
Start: 2018-05-19 | End: 2018-05-22

## 2018-05-19 NOTE — TELEPHONE ENCOUNTER
"  Clinic Page Return Call  5/19/2018  3:50AM    Call returned to Patient at [phone]    Patient reports she awoke around 2AM to the use the restroom and noted dysuria. Additionally patient noted some red spots on the toilet paper. Patient unsure if bleeding coming from urethra, vagina or rectum. She denies noting any blood in the bowl. Since then patient has had increased urinary frequency along with continued dysuria. Patient also reports noting small dark clots over the last few days (did not specify if noted in TP, in bowl or otherwise). She denies any fevers, chills, back or flank pain. She has had UTIs in the past, though unable to recall last episode, and feel this is very similar to prior episode. Patient concerned as she just recently finished antibiotics this Wednesday for recent cat bite.     She states her last menstruation occurred around the age of 52. She has had prior uterine cyst removed \"years ago.\" Also reported that she had the lining of her uterus evaluated approximately 5 years ago and per chart review due to vaginal bleeding in postmenopausal woman. Biopsy showed disordered proliferative endometrium without cytologic atypia. Denies any further bleeding episodes since.    Agree with patient this is most likely acute cystitis.   Allergies were verified.   Bactrim DS BID for 3 days ordered and sent to [facility] in [place].    As this may be more likely hematuria, but cannot rule out vaginal bleeding, encouraged patient to follow up in the clinic if it does not resolve by Monday. Patient is agreeable.    Bonita Power MD  [facility] Resident PGY-1  Pager [phone]    "

## 2018-07-23 ENCOUNTER — OFFICE VISIT (OUTPATIENT)
Dept: FAMILY MEDICINE | Facility: CLINIC | Age: 63
End: 2018-07-23
Payer: MEDICARE

## 2018-07-23 VITALS
HEART RATE: 57 BPM | WEIGHT: 189.6 LBS | DIASTOLIC BLOOD PRESSURE: 85 MMHG | RESPIRATION RATE: 16 BRPM | OXYGEN SATURATION: 95 % | TEMPERATURE: 97.7 F | SYSTOLIC BLOOD PRESSURE: 134 MMHG | BODY MASS INDEX: 32.54 KG/M2

## 2018-07-23 DIAGNOSIS — G90.50 REFLEX SYMPATHETIC DYSTROPHY: ICD-10-CM

## 2018-07-23 DIAGNOSIS — G47.00 INSOMNIA, UNSPECIFIED TYPE: ICD-10-CM

## 2018-07-23 DIAGNOSIS — F41.9 ANXIETY: ICD-10-CM

## 2018-07-23 DIAGNOSIS — G89.4 CHRONIC PAIN SYNDROME: Primary | ICD-10-CM

## 2018-07-23 DIAGNOSIS — E78.5 HYPERLIPIDEMIA LDL GOAL <130: ICD-10-CM

## 2018-07-23 LAB
AMPHETAMINES QUAL: NEGATIVE
BARBITURATES QUAL URINE: NEGATIVE
BENZODIAZEPINE QUAL URINE: NEGATIVE
BUPRENORPHINE QUAL URINE: NEGATIVE
CANNABINOIDS UR QL SCN: NEGATIVE
COCAINE QUAL URINE: NEGATIVE
METHAMPHETAMINE: NEGATIVE
METHODONE QUAL: NEGATIVE
MORPHINE QUAL: NEGATIVE
OXYCODONE QUAL: POSITIVE
PHENCYCLIDINE: NEGATIVE
PROPOXYPHENE: NEGATIVE
TEMPERATURE OF URINE WAS BETWEEN 90-100 DEGREES F: YES
TRICYCLIC ANTIDEPRESSANTS: NEGATIVE

## 2018-07-23 RX ORDER — OXYCODONE HYDROCHLORIDE 5 MG/1
5 TABLET ORAL EVERY 4 HOURS PRN
Qty: 150 TABLET | Refills: 0 | Status: SHIPPED | OUTPATIENT
Start: 2018-08-23 | End: 2018-10-29

## 2018-07-23 RX ORDER — OXYCODONE HYDROCHLORIDE 5 MG/1
5 TABLET ORAL EVERY 4 HOURS PRN
Qty: 150 TABLET | Refills: 0 | Status: SHIPPED | OUTPATIENT
Start: 2018-08-23 | End: 2018-07-23

## 2018-07-23 RX ORDER — GABAPENTIN 600 MG/1
1200 TABLET ORAL 3 TIMES DAILY
Qty: 540 TABLET | Refills: 3 | Status: SHIPPED | OUTPATIENT
Start: 2018-07-23 | End: 2018-08-24

## 2018-07-23 RX ORDER — ZOLPIDEM TARTRATE 10 MG/1
10 TABLET ORAL
Qty: 90 TABLET | Refills: 1 | Status: SHIPPED | OUTPATIENT
Start: 2018-07-23 | End: 2018-10-29

## 2018-07-23 RX ORDER — OXYCODONE HYDROCHLORIDE 5 MG/1
5 TABLET ORAL EVERY 4 HOURS PRN
Qty: 150 TABLET | Refills: 0 | Status: SHIPPED | OUTPATIENT
Start: 2018-09-23 | End: 2018-07-23

## 2018-07-23 RX ORDER — OXYCODONE HYDROCHLORIDE 5 MG/1
5 TABLET ORAL EVERY 4 HOURS PRN
Qty: 150 TABLET | Refills: 0 | Status: SHIPPED | OUTPATIENT
Start: 2018-07-23 | End: 2018-07-23

## 2018-07-23 RX ORDER — SIMVASTATIN 20 MG
20 TABLET ORAL AT BEDTIME
Qty: 90 TABLET | Refills: 3 | Status: SHIPPED | OUTPATIENT
Start: 2018-07-23 | End: 2018-07-27

## 2018-07-23 NOTE — PROGRESS NOTES
"       HPI       Hailee Mayo is a 62 year old  who presents for   Chief Complaint   Patient presents with     Refill Request     Ambien,oxycodone, gabapentin, sertraline, simvastatin     RECHECK     right thumb arthrititis- worsening with picking up items.Sharp pain into the wrist.     Derm Problem     bottom of her feet the skin is cracking with some pain when applied pressure.         Concern: Follow up  1.Chronic pain  2.Chronic Regional Pain Synd  Long history of reflex sympathetic dystrophy after gastroc slide surgery in 2011.  Initially in the left leg.  In 2012 began developing some symptoms in the right leg.  This is remained relatively stable compared to the left.  She has been on oxycodone 5 mg-5 tablets per day.  She uses different combinations of 2 and 1 pills.  In addition she uses gabapentin.  She is in an exercise program.  Is on SSRI for anxiety/depression which is helped immensely.  Her FAQ5 is in the 50-60s range.  She is fairly limited in her activities.    She is interested in considering medical cannabis.  Her DIRE and ORT scores make this a reasonable option.  The affordability will be her major barrier.  She does not want to smoke marijuana since she has never smoked cigarettes or anything in her life.  She is interested in pills.  Discussed the process for medical cannabis certification.  I will refer her to Dr. Patrick at [facility].  The hope is that she will be able to reduce her opioids by 50% in 1 year.  She would like this.  She also aware that she may need to have an new behavioral health assessment and new care plan.          3.Anxiety  -things seem \"pretty good\" per pt  - granddaughter is getting  needs that stress to go away  - stress with her son and grandkids    4.Insomnia / zolpidem  -needs it daily to sleep    5. Callous on heels/feet  Filing regularly.  Used special cream - no improvement  Now cracked. Painful           Problem, Medication and Allergy Lists were " reviewed and updated if needed..  Patient is an established patient of this clinic..         Review of Systems:   Review of Systems  Negative ROS except as noted above in HPI         Physical Exam:     Vitals:    07/23/18 1054   BP: 134/85   Pulse: 57   Resp: 16   Temp: 97.7  F (36.5  C)   TempSrc: Oral   SpO2: 95%   Weight: 189 lb 9.6 oz (86 kg)     Body mass index is 32.54 kg/(m^2).  Vitals were reviewed and were normal     Physical Exam   Gen: no distress  Feet: large calluses on heels and balls of feet. Deep cracks in them. No infection  L Leg: pain with light touch - stable  MSE: good        Results:     Results for orders placed or performed in visit on 07/23/18   Rapid Urine Drug Screen ([facility])   Result Value Ref Range    Phencyclidine NEGATIVE NEGATIVE    Propoxyphene NEGATIVE NEGATIVE    Tricyclic Antidepressants NEGATIVE NEGATIVE    Amphetamines Qual NEGATIVE NEGATIVE    Barbiturates Qual Urine NEGATIVE NEGATIVE    Buprenorphine Qual Urine NEGATIVE NEGATIVE    Benzodiazepine Qual Urine NEGATIVE NEGATIVE    Cocaine Qual Urine NEGATIVE NEGATIVE    Cannabinoids Qual Urine NEGATIVE NEGATIVE    Methamphetamine Qual NEGATIVE NEGATIVE    Methadone Qual NEGATIVE NEGATIVE    Morphine Qual NEGATIVE NEGATIVE    Oxycodone Qual POSITIVE (A) NEGATIVE    Temperature of Urine was Between  Degrees F YES YES           Assessment and Plan        1. Chronic pain syndrome  2.Chronic Regional Pain Synd / Reflex sympathetic dystrophy  Long-standing pain in her left leg from reflex sympathetic dystrophy also known as chronic regional pain syndrome.  She is on oxycodone 25 mg per day (37.5 MME).  Also on gabapentin 3600 mg per day.  Patient is interested in medical cannabis.  ORT is good.  DIRE is good.  Will refer to Dr. Kruse for possible  certification.  Cost will be a barrier for her.    Utox - expected   - expected  FAQ - 60 (best yet)  Care Plan Date: August/2017  Current morphine equivalents/day = 37.5 mg    Naloxone - Yes (on zolpidem)    Refill for oxycodone x 3 months    - Rapid Urine Drug Screen ([place])  - oxyCODONE IR (ROXICODONE) 5 MG tablet; Take 1 tablet (5 mg) by mouth every 4 hours as needed for moderate to severe pain  Dispense: 150 tablet; Refill: 0  - gabapentin (NEURONTIN) 600 MG tablet; Take 2 tablets (1,200 mg) by mouth 3 times daily  Dispense: 540 tablet; Refill: 3    3. Anxiety  - sertraline (ZOLOFT) 50 MG tablet; Take 1 tablet (50 mg) by mouth daily  Dispense: 90 tablet; Refill: 3    4. Hyperlipidemia LDL goal <130  - simvastatin (ZOCOR) 20 MG tablet; Take 1 tablet (20 mg) by mouth At Bedtime  Dispense: 90 tablet; Refill: 3    5. Insomnia, unspecified type  - zolpidem (AMBIEN) 10 MG tablet; Take 1 tablet (10 mg) by mouth nightly as needed  Dispense: 90 tablet; Refill: 1    6. RTC with Dr Patrick for possible Medical Cannabis certification. RTC with Lilibeth in 3 months         Options for treatment and follow-up care were reviewed with the patient. Hailee Mayo  engaged in the decision making process and verbalized understanding of the options discussed and agreed with the final plan.    Kaleb Mcelroy MD

## 2018-07-23 NOTE — MR AVS SNAPSHOT
After Visit Summary   7/23/2018    Hailee Mayo    MRN: [id number]           Patient Information     Date Of Birth          1955        Visit Information        Provider Department      7/23/2018 10:40 AM Kaleb Mcelroy MD [facility]        Today's Diagnoses     Chronic pain syndrome    -  1    Reflex sympathetic dystrophy        Anxiety        Hyperlipidemia LDL goal <130        Insomnia, unspecified type           Follow-ups after your visit        Your next 10 appointments already scheduled     Aug 20, 2018 10:40 AM CDT   Return Visit with [facility] ([facility])    [address],  [address]  [place] [address]   [phone]              Who to contact     Please call your clinic at [phone] to:    Ask questions about your health    Make or cancel appointments    Discuss your medicines    Learn about your test results    Speak to your doctor            Additional Information About Your Visit        MyChart Information     [employer] gives you secure access to your electronic health record. If you see a primary care provider, you can also send messages to your care team and make appointments. If you have questions, please call your primary care clinic.  If you do not have a primary care provider, please call [phone] and they will assist you.      [employer] is an electronic gateway that provides easy, online access to your medical records. With [employer], you can request a clinic appointment, read your test results, renew a prescription or communicate with your care team.     To access your existing account, please contact your [facility] or call [phone] for assistance.        Care EveryWhere ID     This is your Care EveryWhere ID. This could be used by other organizations to access your [facility] medical records  [id number]        Your Vitals Were     Pulse Temperature  Respirations Pulse Oximetry BMI (Body Mass Index)       57 97.7  F (36.5  C) (Oral) 16 95% 32.54 kg/m2        Blood Pressure from Last 3 Encounters:   07/23/18 134/85   04/30/18 138/82   02/05/18 120/83    Weight from Last 3 Encounters:   07/23/18 189 lb 9.6 oz (86 kg)   04/30/18 183 lb 12.8 oz (83.4 kg)   02/05/18 187 lb 3.2 oz (84.9 kg)              We Performed the Following     Rapid Urine Drug Screen ([place])          Today's Medication Changes          These changes are accurate as of 7/23/18  4:01 PM.  If you have any questions, ask your nurse or doctor.               Start taking these medicines.        Dose/Directions    oxyCODONE IR 5 MG tablet   Commonly known as:  ROXICODONE   Used for:  Chronic pain syndrome   Started by:  Kaleb Mcelroy MD        Dose:  5 mg   Start taking on:  8/23/2018   Take 1 tablet (5 mg) by mouth every 4 hours as needed for moderate to severe pain   Quantity:  150 tablet   Refills:  0         Stop taking these medicines if you haven't already. Please contact your care team if you have questions.     cefdinir 250 MG/5ML suspension   Commonly known as:  OMNICEF   Stopped by:  Kaleb Mcelroy MD           metroNIDAZOLE 500 MG tablet   Commonly known as:  FLAGYL   Stopped by:  Kaleb Mcelroy MD                Where to get your medicines      These medications were sent to [facility] [address] [place] [address] AT [address] [address]     Phone:  [phone]     gabapentin 600 MG tablet    sertraline 50 MG tablet    simvastatin 20 MG tablet         Some of these will need a paper prescription and others can be bought over the counter.  Ask your nurse if you have questions.     Bring a paper prescription for each of these medications     oxyCODONE IR 5 MG tablet    zolpidem 10 MG tablet               Information about OPIOIDS     PRESCRIPTION OPIOIDS: WHAT YOU NEED TO KNOW   We gave you an opioid  (narcotic) pain medicine. It is important to manage your pain, but opioids are not always the best choice. You should first try all the other options your care team gave you. Take this medicine for as short a time (and as few doses) as possible.     These medicines have risks:    DO NOT drive when on new or higher doses of pain medicine. These medicines can affect your alertness and reaction times, and you could be arrested for driving under the influence (DUI). If you need to use opioids long-term, talk to your care team about driving.    DO NOT operate heave machinery    DO NOT do any other dangerous activities while taking these medicines.     DO NOT drink any alcohol while taking these medicines.      If the opioid prescribed includes acetaminophen, DO NOT take with any other medicines that contain acetaminophen. Read all labels carefully. Look for the word  acetaminophen  or  Tylenol.  Ask your pharmacist if you have questions or are unsure.    You can get addicted to pain medicines, especially if you have a history of addiction (chemical, alcohol or substance dependence). Talk to your care team about ways to reduce this risk.    Store your pills in a secure place, locked if possible. We will not replace any lost or stolen medicine. If you don t finish your medicine, please throw away (dispose) as directed by your pharmacist. The [place] Pollution Control Agency has more information about safe disposal: https://www.pca.[facility].mn.us/living-green/managing-unwanted-medications.     All opioids tend to cause constipation. Drink plenty of water and eat foods that have a lot of fiber, such as fruits, vegetables, prune juice, apple juice and high-fiber cereal. Take a laxative (Miralax, milk of magnesia, Colace, Senna) if you don t move your bowels at least every other day.          Primary Care Provider Office Phone # Fax #    Kaleb Mcelroy [phone]       [address]  [address]        Equal Access to Services     [facility]: Hadii aad robert марина Lynneali, waelida chrisrosarioha, qasofia kacooperkatty browne. So [facility] [phone].    ATENCIÓN: Si habla español, tiene a roca disposición servicios gratuitos de asistencia lingüística. Mauryame al [phone].    We comply with applicable federal civil rights laws and [place] laws. We do not discriminate on the basis of race, color, national origin, age, disability, sex, sexual orientation, or gender identity.            Thank you!     Thank you for choosing [facility]  for your care. Our goal is always to provide you with excellent care. Hearing back from our patients is one way we can continue to improve our services. Please take a few minutes to complete the written survey that you may receive in the mail after your visit with us. Thank you!             Your Updated Medication List - Protect others around you: Learn how to safely use, store and throw away your medicines at www.disposemymeds.org.          This list is accurate as of 7/23/18  4:01 PM.  Always use your most recent med list.                   Brand Name Dispense Instructions for use Diagnosis    gabapentin 600 MG tablet    NEURONTIN    540 tablet    Take 2 tablets (1,200 mg) by mouth 3 times daily    Reflex sympathetic dystrophy       MULTIVITAMIN ADULT PO      Take by mouth daily        naloxone nasal spray    NARCAN    0.2 mL    Spray 1 spray (4 mg) into one nostril alternating nostrils as needed for opioid reversal (every 2-3 minutes until assistance arrives.)    Chronic pain syndrome       order for DME     1 each    Equipment being ordered: Rodriguez    Reflex sympathetic dystrophy of the lower limb       oxyCODONE IR 5 MG tablet   Start taking on:  8/23/2018    ROXICODONE    150 tablet    Take 1 tablet (5 mg) by mouth every 4 hours as needed for moderate to severe pain    Chronic pain syndrome       PROBIOTIC  DAILY PO      Take by mouth daily        sertraline 50 MG tablet    ZOLOFT    90 tablet    Take 1 tablet (50 mg) by mouth daily    Anxiety       simvastatin 20 MG tablet    ZOCOR    90 tablet    Take 1 tablet (20 mg) by mouth At Bedtime    Hyperlipidemia LDL goal <130       valACYclovir 500 MG tablet    VALTREX    180 tablet    Take 1 tablet (500 mg) by mouth daily    Herpes simplex virus infection       zolpidem 10 MG tablet    AMBIEN    90 tablet    Take 1 tablet (10 mg) by mouth nightly as needed    Insomnia, unspecified type

## 2018-07-27 DIAGNOSIS — E78.5 HYPERLIPIDEMIA LDL GOAL <130: ICD-10-CM

## 2018-07-27 NOTE — TELEPHONE ENCOUNTER

## 2018-07-30 RX ORDER — SIMVASTATIN 20 MG
20 TABLET ORAL AT BEDTIME
Qty: 90 TABLET | Refills: 1 | Status: SHIPPED | OUTPATIENT
Start: 2018-07-30 | End: 2019-01-15

## 2018-08-15 ENCOUNTER — TRANSFERRED RECORDS (OUTPATIENT)
Dept: HEALTH INFORMATION MANAGEMENT | Facility: CLINIC | Age: 63
End: 2018-08-15

## 2018-08-15 DIAGNOSIS — F41.9 ANXIETY: ICD-10-CM

## 2018-08-15 NOTE — TELEPHONE ENCOUNTER

## 2018-08-20 ENCOUNTER — OFFICE VISIT (OUTPATIENT)
Dept: FAMILY MEDICINE | Facility: CLINIC | Age: 63
End: 2018-08-20
Payer: MEDICARE

## 2018-08-20 VITALS
BODY MASS INDEX: 31.86 KG/M2 | OXYGEN SATURATION: 94 % | DIASTOLIC BLOOD PRESSURE: 87 MMHG | HEART RATE: 92 BPM | WEIGHT: 185.6 LBS | TEMPERATURE: 97.9 F | SYSTOLIC BLOOD PRESSURE: 121 MMHG

## 2018-08-20 DIAGNOSIS — G90.522 COMPLEX REGIONAL PAIN SYNDROME TYPE 1 OF LEFT LOWER EXTREMITY: Primary | ICD-10-CM

## 2018-08-20 ASSESSMENT — ENCOUNTER SYMPTOMS
CONSTIPATION: 0
HEADACHES: 0
NUMBNESS: 0
DIZZINESS: 0
ENDOCRINE NEGATIVE: 1
DYSPHORIC MOOD: 0
SLEEP DISTURBANCE: 0
CARDIOVASCULAR NEGATIVE: 1
ALLERGIC/IMMUNOLOGIC NEGATIVE: 1
EYES NEGATIVE: 1
ABDOMINAL PAIN: 0
FATIGUE: 1
RESPIRATORY NEGATIVE: 1
ARTHRALGIAS: 1
MYALGIAS: 1
FEVER: 0

## 2018-08-20 NOTE — PATIENT INSTRUCTIONS
Here is the plan from today's visit    1. Complex regional pain syndrome type 1 of left lower extremity  Patient was added to the Registry for cannabis.  The Medical Cannabis from the state will contact you with the details of the program.   1. Complex regional pain syndrome type 1 of left lower extremity  Patient was added to the Registry for cannabis.  The Medical Cannabis from the state will contact you with the details of the program.     Please call or return to clinic if your symptoms don't go away.    Follow up plan  Please make a clinic appointment for follow up with your primary physician Kaleb Mcelroy MD .    Thank you for coming to [facility] today.  Lab Testing:  **If you had lab testing today and your results are reassuring or normal they will be mailed to you or sent through [facility] within 7 days.   **If the lab tests need quick action we will call you with the results.  The phone number we will call with results is # [phone] (home) none (work). If this is not the best number please call our clinic and change the number.  Medication Refills:  If you need any refills please call your pharmacy and they will contact us.   If you need to  your refill at a new pharmacy, please contact the new pharmacy directly. The new pharmacy will help you get your medications transferred faster.   Scheduling:  If you have any concerns about today's visit or wish to schedule another appointment please call our office during normal business hours [phone] (8-5:00 M-F)  If a referral was made to a [facility] and you don't get a call from central scheduling please call [phone].  If a Mammogram was ordered for you at [facility] call [phone] to schedule or change your appointment.  If you had an XRay/CT/Ultrasound/MRI ordered the number is [phone] to schedule or change your radiology appointment.   Medical Concerns:  If you have urgent medical concerns please  call [phone] at any time of the day.    Shaheen Patrick MD

## 2018-08-20 NOTE — PROGRESS NOTES
HPI       Hailee Mayo is a 62 year old  who presents for   Chief Complaint   Patient presents with     Other     Pt is here to see if she is a possible candidate for medical cannibis         Concern: Possible medical cannibis candidate   Description of the problem :Pt stated that she has too many prescriptions and would like to see if she is a good candidate for medical marijuana.  Pain started after surgery on tendon 2011, was finally diagnosed with Complex regional pain syndrome. Continued to have pain was seen at [facility], was offered a stimulator. Was on oxycodone from pain clinic. Creams and TENS unit.     Currently pain is reasonably controlled with 5 , 5 mg pills a day. Is able to go to water aerobics 5 days a week, though still not able to go for a walk as it hurts. She did have to go on disability from her job as a .  Area whole leg is involved,  Exacerbated by being on feet.     Problems: with oxycodone,  adverse side effect constipation/diarrhea,  Uses biox 4 -a probiotic.     Recently had an episode of syncope after being on the toilet.  Was see in ED -no specific cause found.    Started sertraline for depression when had a series of tragedies in family.     No history of hallucinations.     No CD history.   Perceptions of medical Marijuana- Friend had chronic pain and found that medical marijuana,  Would like to take something safer.     Past Medical History:   Diagnosis Date     Complex regional pain syndrome      Sleep problems      Sore throat      Trouble swallowing        DIRE Score:    7/23/2018   Total Score 19      Score 14-21: May be a candidate for long-term opioid analgesia      Opioid Risk Tool Score:  OPIOID RISK TOOL TOTAL SCORE 8/14/2017   Total Score 3      Total Score Risk Category  0 - 3 = Low Risk  of future problems with Opioid     Functional Assessment  Questionnaire -5  FUNCTIONAL ASSESSMENT QUESTIONNAIRE SCORE 4/30/2018 7/23/2018   Total Score 50 60        Problem, Medication and Allergy Lists were reviewed and updated if needed..    Patient is an established patient of this clinic..         Review of Systems:   Review of Systems   Constitutional: Positive for fatigue. Negative for fever.   HENT: Negative.    Eyes: Negative.    Respiratory: Negative.    Cardiovascular: Negative.    Gastrointestinal: Negative for abdominal pain and constipation.   Endocrine: Negative.    Musculoskeletal: Positive for arthralgias (left leg) and myalgias.   Allergic/Immunologic: Negative.    Neurological: Negative for dizziness, numbness and headaches.   Psychiatric/Behavioral: Negative for dysphoric mood, sleep disturbance (treated with ambien) and suicidal ideas.          Physical Exam:     Vitals:    08/20/18 1032   BP: 121/87   Pulse: 92   Temp: 97.9  F (36.6  C)   TempSrc: Oral   SpO2: 94%   Weight: 185 lb 9.6 oz (84.2 kg)     Body mass index is 31.86 kg/(m^2).  Vitals were reviewed and were normal     Physical Exam   Constitutional: She is oriented to person, place, and time. She appears well-developed. No distress.   HENT:   Head: Normocephalic.   Eyes: Conjunctivae are normal. No scleral icterus.   Neck: Normal range of motion. No thyromegaly present.   Cardiovascular: Normal rate, regular rhythm and normal heart sounds.    No murmur heard.  Pulmonary/Chest: Effort normal and breath sounds normal. No respiratory distress. She has no wheezes.   Abdominal: Soft. Bowel sounds are normal. She exhibits no distension. There is no splenomegaly or hepatomegaly. There is no tenderness.   Musculoskeletal: She exhibits no edema.   Lymphadenopathy:     She has no cervical adenopathy.   Neurological: She is alert and oriented to person, place, and time.   Skin: Skin is warm and dry. She is not diaphoretic.   Psychiatric: She has a normal mood and affect. Her behavior is normal. Judgment and thought content normal.   Vitals reviewed.        Results:      Results from this visit  Results  for orders placed or performed in visit on 07/23/18   Rapid Urine Drug Screen ([facility])   Result Value Ref Range    Phencyclidine NEGATIVE NEGATIVE    Propoxyphene NEGATIVE NEGATIVE    Tricyclic Antidepressants NEGATIVE NEGATIVE    Amphetamines Qual NEGATIVE NEGATIVE    Barbiturates Qual Urine NEGATIVE NEGATIVE    Buprenorphine Qual Urine NEGATIVE NEGATIVE    Benzodiazepine Qual Urine NEGATIVE NEGATIVE    Cocaine Qual Urine NEGATIVE NEGATIVE    Cannabinoids Qual Urine NEGATIVE NEGATIVE    Methamphetamine Qual NEGATIVE NEGATIVE    Methadone Qual NEGATIVE NEGATIVE    Morphine Qual NEGATIVE NEGATIVE    Oxycodone Qual POSITIVE (A) NEGATIVE    Temperature of Urine was Between  Degrees F YES YES       Assessment and Plan      1. Complex regional pain syndrome type 1 of left lower extremity  Patient was added to the Registry for cannabis.  The Medical Cannabis from the state will contact you with the details of the program.       The visit was 45 minutes > 1/2 of the visit was spent in counseling and coordination of care regarding chronic pain and medical cannabis      There are no discontinued medications.    Options for treatment and follow-up care were reviewed with the patient. Hailee Mayo  engaged in the decision making process and verbalized understanding of the options discussed and agreed with the final plan.    Shaheen Patrick MD

## 2018-08-20 NOTE — MR AVS SNAPSHOT
After Visit Summary   8/20/2018    Hailee Mayo    MRN: [id number]           Patient Information     Date Of Birth          1955        Visit Information        Provider Department      8/20/2018 10:40 AM Shaheen Patrick MD [facility]        Today's Diagnoses     Complex regional pain syndrome type 1 of left lower extremity    -  1      Care Instructions    Here is the plan from today's visit    1. Complex regional pain syndrome type 1 of left lower extremity  Patient was added to the Registry for cannabis.  The Medical Cannabis from the state will contact you with the details of the program.   1. Complex regional pain syndrome type 1 of left lower extremity  Patient was added to the Registry for cannabis.  The Medical Cannabis from the state will contact you with the details of the program.     Please call or return to clinic if your symptoms don't go away.    Follow up plan  Please make a clinic appointment for follow up with your primary physician Kaleb Mcelroy MD .    Thank you for coming to [facility] today.  Lab Testing:  **If you had lab testing today and your results are reassuring or normal they will be mailed to you or sent through [employer] within 7 days.   **If the lab tests need quick action we will call you with the results.  The phone number we will call with results is # [phone] (home) none (work). If this is not the best number please call our clinic and change the number.  Medication Refills:  If you need any refills please call your pharmacy and they will contact us.   If you need to  your refill at a new pharmacy, please contact the new pharmacy directly. The new pharmacy will help you get your medications transferred faster.   Scheduling:  If you have any concerns about today's visit or wish to schedule another appointment please call our office during normal business hours [phone] (8-5:00 M-F)  If a referral was made to a [facility] and you don't get a call from central scheduling please call [phone].  If a Mammogram was ordered for you at [facility] call [phone] to schedule or change your appointment.  If you had an XRay/CT/Ultrasound/MRI ordered the number is [phone] to schedule or change your radiology appointment.   Medical Concerns:  If you have urgent medical concerns please call [phone] at any time of the day.    Shaheen Patrick MD            Follow-ups after your visit        Follow-up notes from your care team     Return if symptoms worsen or fail to improve.      Who to contact     Please call your clinic at [phone] to:    Ask questions about your health    Make or cancel appointments    Discuss your medicines    Learn about your test results    Speak to your doctor            Additional Information About Your Visit        [employer] Information     [employer] gives you secure access to your electronic health record. If you see a primary care provider, you can also send messages to your care team and make appointments. If you have questions, please call your primary care clinic.  If you do not have a primary care provider, please call [phone] and they will assist you.      [employer] is an electronic gateway that provides easy, online access to your medical records. With [employer], you can request a clinic appointment, read your test results, renew a prescription or communicate with your care team.     To access your existing account, please contact your [facility] or call [phone] for assistance.        Care EveryWhere ID     This is your Care EveryWhere ID. This could be used by other organizations to access your [place] medical records  [id number]        Your Vitals Were     Pulse Temperature Pulse Oximetry Breastfeeding? BMI (Body Mass Index)       92 97.9  F (36.6  C) (Oral) 94% No 31.86 kg/m2        Blood Pressure from Last 3 Encounters:    08/20/18 121/87   07/23/18 134/85   04/30/18 138/82    Weight from Last 3 Encounters:   08/20/18 185 lb 9.6 oz (84.2 kg)   07/23/18 189 lb 9.6 oz (86 kg)   04/30/18 183 lb 12.8 oz (83.4 kg)              Today, you had the following     No orders found for display         Today's Medication Changes          These changes are accurate as of 8/20/18  1:34 PM.  If you have any questions, ask your nurse or doctor.               Stop taking these medicines if you haven't already. Please contact your care team if you have questions.     order for DME                    Primary Care Provider Office Phone # Fax #    Kaleb Mcelroy [phone]       [address] [address]        Equal Access to Services     ANKITA ELLIS : Ida Tovar, angela hall, chele quintero, katty lim . So [facility] [phone].    ATENCIÓN: Si habla español, tiene a roca disposición servicios gratuitos de asistencia lingüística. Tito al [phone].    We comply with applicable federal civil rights laws and [place] laws. We do not discriminate on the basis of race, color, national origin, age, disability, sex, sexual orientation, or gender identity.            Thank you!     Thank you for choosing [facility]  for your care. Our goal is always to provide you with excellent care. Hearing back from our patients is one way we can continue to improve our services. Please take a few minutes to complete the written survey that you may receive in the mail after your visit with us. Thank you!             Your Updated Medication List - Protect others around you: Learn how to safely use, store and throw away your medicines at www.disposemymeds.org.          This list is accurate as of 8/20/18  1:34 PM.  Always use your most recent med list.                   Brand Name Dispense Instructions for use Diagnosis    gabapentin 600 MG tablet     NEURONTIN    540 tablet    Take 2 tablets (1,200 mg) by mouth 3 times daily    Reflex sympathetic dystrophy       MULTIVITAMIN ADULT PO      Take by mouth daily        naloxone nasal spray    NARCAN    0.2 mL    Spray 1 spray (4 mg) into one nostril alternating nostrils as needed for opioid reversal (every 2-3 minutes until assistance arrives.)    Chronic pain syndrome       oxyCODONE IR 5 MG tablet   Start taking on:  8/23/2018    ROXICODONE    150 tablet    Take 1 tablet (5 mg) by mouth every 4 hours as needed for moderate to severe pain    Chronic pain syndrome       PROBIOTIC DAILY PO      Take by mouth daily        sertraline 50 MG tablet    ZOLOFT    90 tablet    Take 1 tablet (50 mg) by mouth daily    Anxiety       simvastatin 20 MG tablet    ZOCOR    90 tablet    Take 1 tablet (20 mg) by mouth At Bedtime    Hyperlipidemia LDL goal <130       valACYclovir 500 MG tablet    VALTREX    180 tablet    Take 1 tablet (500 mg) by mouth daily    Herpes simplex virus infection       zolpidem 10 MG tablet    AMBIEN    90 tablet    Take 1 tablet (10 mg) by mouth nightly as needed    Insomnia, unspecified type

## 2018-08-24 DIAGNOSIS — G90.50 REFLEX SYMPATHETIC DYSTROPHY: ICD-10-CM

## 2018-08-24 RX ORDER — GABAPENTIN 600 MG/1
1200 TABLET ORAL 3 TIMES DAILY
Qty: 540 TABLET | Refills: 3 | Status: SHIPPED | OUTPATIENT
Start: 2018-08-24 | End: 2019-07-23

## 2018-08-31 ENCOUNTER — TELEPHONE (OUTPATIENT)
Dept: FAMILY MEDICINE | Facility: CLINIC | Age: 63
End: 2018-08-31

## 2018-08-31 NOTE — TELEPHONE ENCOUNTER
RN called patient. Patient will call [facility] to request transfer of [facility].     Jyothi Vazquez RN

## 2018-08-31 NOTE — TELEPHONE ENCOUNTER
[facility] phone call message- medication clarification/question:    Full Medication Name: zolpidem (AMBIEN) 10 MG tablet   Dose: Take 1 tablet (10 mg) by mouth nightly as needed - Oral    Question/Clarification needed: Rx was sent to [facility], patient would like Rx sent to [facility]    Pharmacy confirmed as   [facility] [address] [place] [address] [address]  [facility] [address]  Phone: [phone] Fax: [phone]    [email or web address] - [address]  [address]  [facility] [address]  Phone: [phone] Fax: [phone]  : Yes, [facility], please    Please leave ONLY preferred pharmacy    OK to leave a message on voice mail? Yes    Advised patient that RN would call back within 3 hours, unless emergent.    Primary language: English      needed? No    Call taken on August 31, 2018 at 11:18 AM by Maida Christy    [facility]

## 2018-10-29 ENCOUNTER — OFFICE VISIT (OUTPATIENT)
Dept: FAMILY MEDICINE | Facility: CLINIC | Age: 63
End: 2018-10-29
Payer: COMMERCIAL

## 2018-10-29 VITALS
TEMPERATURE: 97.9 F | SYSTOLIC BLOOD PRESSURE: 124 MMHG | BODY MASS INDEX: 30.83 KG/M2 | RESPIRATION RATE: 16 BRPM | OXYGEN SATURATION: 95 % | HEART RATE: 64 BPM | WEIGHT: 179.6 LBS | DIASTOLIC BLOOD PRESSURE: 84 MMHG

## 2018-10-29 DIAGNOSIS — Z13.9 SCREENING FOR CONDITION: ICD-10-CM

## 2018-10-29 DIAGNOSIS — G89.4 CHRONIC PAIN SYNDROME: Primary | ICD-10-CM

## 2018-10-29 DIAGNOSIS — B00.9 HERPES SIMPLEX VIRUS INFECTION: ICD-10-CM

## 2018-10-29 DIAGNOSIS — G47.00 INSOMNIA, UNSPECIFIED TYPE: ICD-10-CM

## 2018-10-29 RX ORDER — VALACYCLOVIR HYDROCHLORIDE 500 MG/1
500 TABLET, FILM COATED ORAL DAILY
Qty: 180 TABLET | Refills: 3 | Status: SHIPPED | OUTPATIENT
Start: 2018-10-29 | End: 2019-10-04

## 2018-10-29 RX ORDER — ZOLPIDEM TARTRATE 10 MG/1
10 TABLET ORAL
Qty: 90 TABLET | Refills: 1 | Status: SHIPPED | OUTPATIENT
Start: 2018-10-29 | End: 2019-04-24

## 2018-10-29 RX ORDER — OXYCODONE HYDROCHLORIDE 5 MG/1
5 TABLET ORAL EVERY 4 HOURS PRN
Qty: 150 TABLET | Refills: 0 | Status: SHIPPED | OUTPATIENT
Start: 2018-11-29 | End: 2018-10-29

## 2018-10-29 RX ORDER — OXYCODONE HYDROCHLORIDE 5 MG/1
5 TABLET ORAL EVERY 4 HOURS PRN
Qty: 150 TABLET | Refills: 0 | Status: SHIPPED | OUTPATIENT
Start: 2018-10-29 | End: 2018-10-29

## 2018-10-29 RX ORDER — OXYCODONE HYDROCHLORIDE 5 MG/1
5 TABLET ORAL EVERY 4 HOURS PRN
Qty: 150 TABLET | Refills: 0 | Status: SHIPPED | OUTPATIENT
Start: 2018-12-29 | End: 2019-01-28

## 2018-10-29 NOTE — PATIENT INSTRUCTIONS
Personal Care Plan for Chronic Pain     1.  Personal Goals:  Engaging and spending time with my grandkids as much as I can, getting to my water aerobics and spending that time with the others int he class and my mom, spending time with my mom and my sister, feel like I am still of use in this world, that I am important to my grandma, a good partner to my , to stay in my house and keep it clean        2.  Sleep:                          *  Basic sleep plan:                                              *reduce or eliminate caffeine and daytime naps                                              * relaxation before bed                                              * limit screen time 1-2 hours prior to bed                                              * establish dark/quiet sleep environment                        *  Nighttime medications including the following: zolpidem     3.  Physical Activity:                          * Home/community based activity:                                              * Yoga dvd 1x/week                                              * water aerobics 2x/week:                          * Listen to your body.  Pace yourself for success.  Don't over-do it.  Don't under do it.                        * Balance activities with rest and set realistic goals.      4.  Nutrition/Weight:                          * Try to eat at least 5 servings of fresh fruits and vegetables each day.                        * Limit processed foods and foods high in sugar, sodium and fat.                        * Maintain a healthy weight.      5.  Mood/Stress Management:                         * Listening to healing cd for relaxation and pain management - can use for acute pain, but it's also helpful to do this daily to help retrain brain                        * Listening [place] music                        * Taking sertraline medication daily     6.  Pain:                          * Non-medication  treatments:                                              * light massage as tolerated     7.  Pain Medications: oxycodone as prescribed     No follow up with behavioral health planned at this time          JEAN CLAUDE Villarreal, GORAN Rodriguez, MANOJ Parrish, DENYS Acosta., JIMY New., & ADRIANA Mcgovern. Cognitive behavioral therapy for chronic pain among veterans: Therapist manual. [place]: [place] [facility].          JEAN CLAUDE Villarreal, GORAN Rodriguez, MANOJ Parrish, DENYS Acosta., JIMY New., & ADRIANA Mcgovern. Cognitive behavioral therapy for chronic pain among veterans: Therapist manual. [place]: [employer] [facility].

## 2018-10-29 NOTE — MR AVS SNAPSHOT
After Visit Summary   10/29/2018    Hailee Mayo    MRN: [id number]           Patient Information     Date Of Birth          1955        Visit Information        Provider Department      10/29/2018 10:40 AM Kaleb Mcelroy MD [facility]        Today's Diagnoses     Chronic pain syndrome    -  1    Screening for condition        Insomnia, unspecified type        Herpes simplex          Care Instructions            Personal Care Plan for Chronic Pain     1.  Personal Goals:  Engaging and spending time with my grandkids as much as I can, getting to my water aerobics and spending that time with the others int he class and my mom, spending time with my mom and my sister, feel like I am still of use in this world, that I am important to my grandma, a good partner to my , to stay in my house and keep it clean        2.  Sleep:                          *  Basic sleep plan:                                              *reduce or eliminate caffeine and daytime naps                                              * relaxation before bed                                              * limit screen time 1-2 hours prior to bed                                              * establish dark/quiet sleep environment                        *  Nighttime medications including the following: zolpidem     3.  Physical Activity:                          * Home/community based activity:                                              * Yoga dvd 1x/week                                              * water aerobics 2x/week:                          * Listen to your body.  Pace yourself for success.  Don't over-do it.  Don't under do it.                        * Balance activities with rest and set realistic goals.      4.  Nutrition/Weight:                          * Try to eat at least 5 servings of fresh fruits and vegetables each day.                        * Limit processed foods and foods high in  sugar, sodium and fat.                        * Maintain a healthy weight.      5.  Mood/Stress Management:                         * Listening to healing cd for relaxation and pain management - can use for acute pain, but it's also helpful to do this daily to help retrain brain                        * Listening [place] music                        * Taking sertraline medication daily     6.  Pain:                          * Non-medication treatments:                                              * light massage as tolerated     7.  Pain Medications: oxycodone as prescribed     No follow up with behavioral health planned at this time          JEAN CLAUDE Villarreal, GORAN Rodriguez, MANOJ Parrish, DENYS Acosta., JIMY New., & ADRIANA Mcgovern. Cognitive behavioral therapy for chronic pain among veterans: Therapist manual. [place] [facility].          JEAN CLAUDE Villarreal McKellar, J.D., TYLER Parrish., DENYS Acosta., JIMY New., & Shaniqua, B.E. Cognitive behavioral therapy for chronic pain among veterans: Therapist manual. [place] [facility] of [facility].              Follow-ups after your visit        Future tests that were ordered for you today     Open Future Orders        Priority Expected Expires Ordered    Screening Mammogram Digital Bilateral Routine  10/29/2019 10/29/2018            Who to contact     Please call your clinic at [phone] to:    Ask questions about your health    Make or cancel appointments    Discuss your medicines    Learn about your test results    Speak to your doctor            Additional Information About Your Visit        E[employer] Information     [employer] gives you secure access to your electronic health record. If you see a primary care provider, you can also send messages to your care team and make appointments. If you have questions, please call your primary care clinic.  If you do not have a primary care provider, please call [phone] and they will  assist you.      [employer] is an electronic gateway that provides easy, online access to your medical records. With [employer], you can request a clinic appointment, read your test results, renew a prescription or communicate with your care team.     To access your existing account, please contact your [facility] or call [phone] for assistance.        Care EveryWhere ID     This is your Care EveryWhere ID. This could be used by other organizations to access your [facility] medical records  [id number]        Your Vitals Were     Pulse Temperature Respirations Pulse Oximetry BMI (Body Mass Index)       64 97.9  F (36.6  C) (Oral) 16 95% 30.83 kg/m2        Blood Pressure from Last 3 Encounters:   10/29/18 124/84   08/20/18 121/87   07/23/18 134/85    Weight from Last 3 Encounters:   10/29/18 179 lb 9.6 oz (81.5 kg)   08/20/18 185 lb 9.6 oz (84.2 kg)   07/23/18 189 lb 9.6 oz (86 kg)              We Performed the Following     Rapid Urine Drug Screen ([facility])          Today's Medication Changes          These changes are accurate as of 10/29/18 11:13 AM.  If you have any questions, ask your nurse or doctor.               Start taking these medicines.        Dose/Directions    oxyCODONE IR 5 MG tablet   Commonly known as:  ROXICODONE   Used for:  Chronic pain syndrome   Started by:  Kaleb Mcelroy MD        Dose:  5 mg   Start taking on:  12/29/2018   Take 1 tablet (5 mg) by mouth every 4 hours as needed for moderate to severe pain   Quantity:  150 tablet   Refills:  0         These medicines have changed or have updated prescriptions.        Dose/Directions    zolpidem 10 MG tablet   Commonly known as:  AMBIEN   This may have changed:  reasons to take this   Used for:  Insomnia, unspecified type   Changed by:  Kaleb Mcelroy MD        Dose:  10 mg   Take 1 tablet (10 mg) by mouth nightly as needed for sleep   Quantity:  90 tablet   Refills:  1            Where to get your medicines       These medications were sent to [facility] - [address]  [address] [facility] [address], [place] [address]     Phone:  [phone]     valACYclovir 500 MG tablet         Some of these will need a paper prescription and others can be bought over the counter.  Ask your nurse if you have questions.     Bring a paper prescription for each of these medications     oxyCODONE IR 5 MG tablet    zolpidem 10 MG tablet               Information about OPIOIDS     PRESCRIPTION OPIOIDS: WHAT YOU NEED TO KNOW   We gave you an opioid (narcotic) pain medicine. It is important to manage your pain, but opioids are not always the best choice. You should first try all the other options your care team gave you. Take this medicine for as short a time (and as few doses) as possible.    Some activities can increase your pain, such as bandage changes or therapy sessions. It may help to take your pain medicine 30 to 60 minutes before these activities. Reduce your stress by getting enough sleep, working on hobbies you enjoy and practicing relaxation or meditation. Talk to your care team about ways to manage your pain beyond prescription opioids.    These medicines have risks:    DO NOT drive when on new or higher doses of pain medicine. These medicines can affect your alertness and reaction times, and you could be arrested for driving under the influence (DUI). If you need to use opioids long-term, talk to your care team about driving.    DO NOT operate heavy machinery    DO NOT do any other dangerous activities while taking these medicines.    DO NOT drink any alcohol while taking these medicines.     If the opioid prescribed includes acetaminophen, DO NOT take with any other medicines that contain acetaminophen. Read all labels carefully. Look for the word  acetaminophen  or  Tylenol.  Ask your pharmacist if you have questions or are unsure.    You can get addicted to pain medicines, especially if  you have a history of addiction (chemical, alcohol or substance dependence). Talk to your care team about ways to reduce this risk.    All opioids tend to cause constipation. Drink plenty of water and eat foods that have a lot of fiber, such as fruits, vegetables, prune juice, apple juice and high-fiber cereal. Take a laxative (Miralax, milk of magnesia, Colace, Senna) if you don t move your bowels at least every other day. Other side effects include upset stomach, sleepiness, dizziness, throwing up, tolerance (needing more of the medicine to have the same effect), physical dependence and slowed breathing.    Store your pills in a secure place, locked if possible. We will not replace any lost or stolen medicine. If you don t finish your medicine, please throw away (dispose) as directed by your pharmacist. The [place] Pollution Control Agency has more information about safe disposal: [email or web address]://[email or web address]/living-[email or web address]/[email or web address]-unwanted-medications         Primary Care Provider Office Phone # Fax #    Kaleb Mcelroy [phone]       [address] [address]        Equal Access to Services     [facility] Hospital[facility] : Hadii jimena Tovar, waelida isabel, qajenniferta farhat quintero, katty lim . So [facility] [phone].    ATENCIÓN: Si habla español, tiene a roca disposición servicios gratuitos de asistencia lingüística. [facility] [phone].    We comply with applicable federal civil rights laws and [place] laws. We do not discriminate on the basis of race, color, national origin, age, disability, sex, sexual orientation, or gender identity.            Thank you!     Thank you for choosing hospitals [facility]  for your care. Our goal is always to provide you with excellent care. Hearing back from our patients is one way we can continue to improve our services. Please take a few minutes to complete the written survey that you may  receive in the mail after your visit with us. Thank you!             Your Updated Medication List - Protect others around you: Learn how to safely use, store and throw away your medicines at www.disposemymeds.org.          This list is accurate as of 10/29/18 11:13 AM.  Always use your most recent med list.                   Brand Name Dispense Instructions for use Diagnosis    gabapentin 600 MG tablet    NEURONTIN    540 tablet    Take 2 tablets (1,200 mg) by mouth 3 times daily    Reflex sympathetic dystrophy       MULTIVITAMIN ADULT PO      Take by mouth daily        naloxone nasal spray    NARCAN    0.2 mL    Spray 1 spray (4 mg) into one nostril alternating nostrils as needed for opioid reversal (every 2-3 minutes until assistance arrives.)    Chronic pain syndrome       oxyCODONE IR 5 MG tablet   Start taking on:  12/29/2018    ROXICODONE    150 tablet    Take 1 tablet (5 mg) by mouth every 4 hours as needed for moderate to severe pain    Chronic pain syndrome       PROBIOTIC DAILY PO      Take by mouth daily        sertraline 50 MG tablet    ZOLOFT    90 tablet    Take 1 tablet (50 mg) by mouth daily    Anxiety       simvastatin 20 MG tablet    ZOCOR    90 tablet    Take 1 tablet (20 mg) by mouth At Bedtime    Hyperlipidemia LDL goal <130       valACYclovir 500 MG tablet    VALTREX    180 tablet    Take 1 tablet (500 mg) by mouth daily    Herpes simplex virus infection       zolpidem 10 MG tablet    AMBIEN    90 tablet    Take 1 tablet (10 mg) by mouth nightly as needed for sleep    Insomnia, unspecified type

## 2018-10-29 NOTE — PROGRESS NOTES
HPI       Hailee Mayo is a 62 year old  who presents for   Chief Complaint   Patient presents with     Refill Request     Oxycodone, Valtrex and Ambien     Syncope     fainting spell occured in the bathroom on Aug 15. Went to urgent care due to hitting her head/face on the floor.          Concern: Refill   1. Refills  - oxycodone for chronic pain  - Valtrex and Ambien    2. Fainting spell  - she was in the bathroom on Aug 15th during the middle of the night  - woke up on the floor with a bloody nose  - [facility] to [facility] where she had CT scan done- nothing wrong  -Nose still causing some pain when laying down on her side  - No dizzy spells since           +++++++      Problem, Medication and Allergy Lists were reviewed and updated if needed..    Patient is an established patient of this clinic..         Review of Systems:   Review of Systems         Physical Exam:     Vitals:    10/29/18 1042   BP: 124/84   Pulse: 64   Resp: 16   Temp: 97.9  F (36.6  C)   TempSrc: Oral   SpO2: 95%   Weight: 179 lb 9.6 oz (81.5 kg)     Body mass index is 30.83 kg/(m^2).  Vitals were reviewed and were normal     Physical Exam  Gen: no distress  Ext: no edema, slight reddish color difference L>R, warm, not as dysthesia-like as before, pulses good.          Results:     Results for orders placed or performed in visit on 10/29/18   Rapid Urine Drug Screen ([facility])   Result Value Ref Range    Phencyclidine NEGATIVE NEGATIVE    Propoxyphene NEGATIVE NEGATIVE    Tricyclic Antidepressants NEGATIVE NEGATIVE    Amphetamines Qual NEGATIVE NEGATIVE    Barbiturates Qual Urine NEGATIVE NEGATIVE    Buprenorphine Qual Urine NEGATIVE NEGATIVE    Benzodiazepine Qual Urine NEGATIVE NEGATIVE    Cocaine Qual Urine NEGATIVE NEGATIVE    Cannabinoids Qual Urine NEGATIVE NEGATIVE    Methamphetamine Qual NEGATIVE NEGATIVE    Methadone Qual NEGATIVE NEGATIVE    Morphine Qual NEGATIVE NEGATIVE    Oxycodone Qual POSITIVE (A) NEGATIVE     Temperature of Urine was Between  Degrees F YES YES         Assessment and Plan        1. Chronic pain syndrome  Complex Regional Pain Syndrome (Reflex Sympathetic dystrophy)  Utox - expected   - expected  FAQ - 60  Care Plan Date: August/2017  Current morphine equivalents/day = 37.5 mg   Naloxone - Yes    Handicap parking form completed  Oxycodone refills x 3 months  Continue water aerobics     - Rapid Urine Drug Screen ([facility])  - oxyCODONE IR (ROXICODONE) 5 MG tablet; Take 1 tablet (5 mg) by mouth every 4 hours as needed for moderate to severe pain  Dispense: 150 tablet; Refill: 0    2.Refills   Insomnia, unspecified type  Faxed to [facility]  - zolpidem (AMBIEN) 10 MG tablet; Take 1 tablet (10 mg) by mouth nightly as needed for sleep  Dispense: 90 tablet; Refill: 1  Herpes simplex  Sent to [facility]  - valACYclovir (VALTREX) 500 MG tablet; Take 1 tablet (500 mg) by mouth daily  Dispense: 180 tablet; Refill: 3    3. Screening for condition  Pt to get at local facility  - Screening Mammogram Digital Bilateral; Future    4. RTC 3 months             Options for treatment and follow-up care were reviewed with the patient. Hailee Mayo  engaged in the decision making process and verbalized understanding of the options discussed and agreed with the final plan.    Kaleb Mcelroy MD

## 2018-11-02 ENCOUNTER — TRANSFERRED RECORDS (OUTPATIENT)
Dept: HEALTH INFORMATION MANAGEMENT | Facility: CLINIC | Age: 63
End: 2018-11-02

## 2018-11-12 ENCOUNTER — TRANSFERRED RECORDS (OUTPATIENT)
Dept: HEALTH INFORMATION MANAGEMENT | Facility: CLINIC | Age: 63
End: 2018-11-12

## 2019-01-14 DIAGNOSIS — E78.5 HYPERLIPIDEMIA LDL GOAL <130: ICD-10-CM

## 2019-01-14 NOTE — TELEPHONE ENCOUNTER

## 2019-01-15 RX ORDER — SIMVASTATIN 20 MG
20 TABLET ORAL AT BEDTIME
Qty: 90 TABLET | Refills: 3 | Status: SHIPPED | OUTPATIENT
Start: 2019-01-15 | End: 2019-11-13

## 2019-01-28 ENCOUNTER — OFFICE VISIT (OUTPATIENT)
Dept: FAMILY MEDICINE | Facility: CLINIC | Age: 64
End: 2019-01-28
Payer: COMMERCIAL

## 2019-01-28 VITALS
HEART RATE: 77 BPM | OXYGEN SATURATION: 96 % | WEIGHT: 179.2 LBS | RESPIRATION RATE: 16 BRPM | BODY MASS INDEX: 30.76 KG/M2 | DIASTOLIC BLOOD PRESSURE: 75 MMHG | SYSTOLIC BLOOD PRESSURE: 122 MMHG | TEMPERATURE: 97.9 F

## 2019-01-28 DIAGNOSIS — E78.5 HYPERLIPIDEMIA WITH TARGET LDL LESS THAN 130: ICD-10-CM

## 2019-01-28 DIAGNOSIS — G90.522 COMPLEX REGIONAL PAIN SYNDROME TYPE 1 OF LEFT LOWER EXTREMITY: ICD-10-CM

## 2019-01-28 DIAGNOSIS — G89.4 CHRONIC PAIN SYNDROME: Primary | ICD-10-CM

## 2019-01-28 LAB
ALBUMIN SERPL-MCNC: 4.8 MG/DL (ref 3.5–4.7)
ALP SERPL-CCNC: 76.1 U/L (ref 31.7–110.5)
ALT SERPL-CCNC: 19.1 U/L (ref 0–45)
AMPHETAMINES QUAL: NEGATIVE
AST SERPL-CCNC: 18.8 U/L (ref 0–45)
BARBITURATES QUAL URINE: NEGATIVE
BENZODIAZEPINE QUAL URINE: NEGATIVE
BILIRUB SERPL-MCNC: <0.4 MG/DL (ref 0.2–1.3)
BUN SERPL-MCNC: 11.5 MG/DL (ref 7–19)
BUPRENORPHINE QUAL URINE: NEGATIVE
CALCIUM SERPL-MCNC: 9.4 MG/DL (ref 8.5–10.1)
CANNABINOIDS UR QL SCN: NEGATIVE
CHLORIDE SERPLBLD-SCNC: 102.5 MMOL/L (ref 98–110)
CHOLEST SERPL-MCNC: 223.5 MG/DL (ref 0–200)
CHOLEST/HDLC SERPL: 5.7 {RATIO} (ref 0–5)
CO2 SERPL-SCNC: 31.1 MMOL/L (ref 20–32)
COCAINE QUAL URINE: NEGATIVE
CREAT SERPL-MCNC: 0.7 MG/DL (ref 0.5–1)
GFR SERPL CREATININE-BSD FRML MDRD: >90 ML/MIN/1.7 M2
GLUCOSE SERPL-MCNC: 101.8 MG'DL (ref 70–99)
HDLC SERPL-MCNC: 39 MG/DL
LDLC SERPL CALC-MCNC: 124 MG/DL (ref 0–129)
METHAMPHETAMINE: NEGATIVE
METHODONE QUAL: NEGATIVE
MORPHINE QUAL: NEGATIVE
OXYCODONE QUAL: POSITIVE
PHENCYCLIDINE: NEGATIVE
POTASSIUM SERPL-SCNC: 4.1 MMOL/DL (ref 3.3–4.5)
PROPOXYPHENE: NEGATIVE
PROT SERPL-MCNC: 7.5 G/DL (ref 6.8–8.8)
SODIUM SERPL-SCNC: 139 MMOL/L (ref 132.6–141.4)
TEMPERATURE OF URINE WAS BETWEEN 90-100 DEGREES F: YES
TRICYCLIC ANTIDEPRESSANTS: NEGATIVE
TRIGL SERPL-MCNC: 303.2 MG/DL (ref 0–150)
VLDL CHOLESTEROL: 60.6 MG/DL (ref 7–32)

## 2019-01-28 RX ORDER — OXYCODONE HYDROCHLORIDE 5 MG/1
5 TABLET ORAL EVERY 4 HOURS PRN
Qty: 150 TABLET | Refills: 0 | Status: SHIPPED | OUTPATIENT
Start: 2019-02-28 | End: 2019-01-28

## 2019-01-28 RX ORDER — OXYCODONE HYDROCHLORIDE 5 MG/1
5 TABLET ORAL EVERY 4 HOURS PRN
Qty: 150 TABLET | Refills: 0 | Status: SHIPPED | OUTPATIENT
Start: 2019-01-28 | End: 2019-01-28

## 2019-01-28 RX ORDER — OXYCODONE HYDROCHLORIDE 5 MG/1
5 TABLET ORAL EVERY 4 HOURS PRN
Qty: 150 TABLET | Refills: 0 | Status: SHIPPED | OUTPATIENT
Start: 2019-03-28 | End: 2019-04-24

## 2019-01-28 NOTE — PATIENT INSTRUCTIONS
Personal Care Plan for Chronic Pain     1.  Personal Goals:  Engaging and spending time with my grandkids as much as I can, getting to my water aerobics and spending that time with the others int he class and my mom, spending time with my mom and my sister, feel like I am still of use in this world, that I am important to my grandma, a good partner to my , to stay in my house and keep it clean        2.  Sleep:                          *  Basic sleep plan:                                              *reduce or eliminate caffeine and daytime naps                                              * relaxation before bed                                              * limit screen time 1-2 hours prior to bed                                              * establish dark/quiet sleep environment                        *  Nighttime medications including the following: zolpidem     3.  Physical Activity:                          * Home/community based activity:                                              * Yoga dvd 1x/week                                              * water aerobics 2x/week:                          * Listen to your body.  Pace yourself for success.  Don't over-do it.  Don't under do it.                        * Balance activities with rest and set realistic goals.      4.  Nutrition/Weight:                          * Try to eat at least 5 servings of fresh fruits and vegetables each day.                        * Limit processed foods and foods high in sugar, sodium and fat.                        * Maintain a healthy weight.      5.  Mood/Stress Management:                         * Listening to healing cd for relaxation and pain management - can use for acute pain, but it's also helpful to do this daily to help retrain brain                        * Listening [place] music                        * Taking sertraline medication daily     6.  Pain:                          * Non-medication  treatments:                                              * light massage as tolerated     7.  Pain Medications: oxycodone as prescribed     No follow up with behavioral health planned at this time          JEAN CLAUDE Villarreal, GORAN Rodriguez, MANOJ Parrish, DENYS Acosta., JIMY New., & ADRIANA Mcgovern. Cognitive behavioral therapy for chronic pain among veterans: Therapist manual. [place]: [place]. [facility].          JEAN CLAUDE Villarreal, GORAN Rodriguez, MANOJ Parrish, DENYS Acosta., JIMY New., & ADRIANA Mcgovern. Cognitive behavioral therapy for chronic pain among veterans: Therapist manual. [place].

## 2019-01-28 NOTE — PROGRESS NOTES
HPI  63 year old female here for evaluation of several issues.    1. Chronic pain  -   Oxy - 1/2/2  Function  No real changes    Utox - expected   - expected  FAQ - 57.5  Care Plan Date: August/2017  Current morphine equivalents/day = 37.5 mg   Naloxone - Yes         2. Pharmacy change  [facility] [place]    3. Toe  Injured toe in early Dec  Now able to walk again    4. Knee  Knee buckled on steps x 1   No further issues    5. Viral syndrome  10days  Getting Better    6. Herpes  Recent outbreak  Using daily prophylaxis  Stable        ROS  6 point ROS neg other than the symptoms noted above in the HPI.    MEDS  Current Outpatient Medications   Medication     gabapentin (NEURONTIN) 600 MG tablet     Multiple Vitamins-Minerals (MULTIVITAMIN ADULT PO)     naloxone (NARCAN) nasal spray     oxyCODONE IR (ROXICODONE) 5 MG tablet     Probiotic Product (PROBIOTIC DAILY PO)     sertraline (ZOLOFT) 50 MG tablet     simvastatin (ZOCOR) 20 MG tablet     valACYclovir (VALTREX) 500 MG tablet     zolpidem (AMBIEN) 10 MG tablet     No current facility-administered medications for this visit.          SOC      FHx  Family History   Problem Relation Age of Onset     Cerebrovascular Disease Mother      Diabetes Father      Diabetes Maternal Grandfather      C.A.D. Maternal Grandfather      Myocardial Infarction Maternal Grandfather      Alcohol/Drug Maternal Grandfather      Cerebrovascular Disease Paternal Grandmother        PHYSICAL EXAMINATION:  Vital signs: /75   Pulse 77   Temp 97.9  F (36.6  C) (Oral)   Resp 16   Wt 81.3 kg (179 lb 3.2 oz)   SpO2 96%   Breastfeeding? No   BMI 30.76 kg/m      Gen: no distress  Lungs: clear  Cor: RRR, no S3 S4 murmur or rub  Ext: no edema, good cap refill        LABS      Results for orders placed or performed in visit on 01/28/19   Rapid Urine Drug Screen ([facility])   Result Value Ref Range    Phencyclidine NEGATIVE NEGATIVE    Propoxyphene NEGATIVE NEGATIVE    Tricyclic Antidepressants  NEGATIVE NEGATIVE    Amphetamines Qual NEGATIVE NEGATIVE    Barbiturates Qual Urine NEGATIVE NEGATIVE    Buprenorphine Qual Urine NEGATIVE NEGATIVE    Benzodiazepine Qual Urine NEGATIVE NEGATIVE    Cocaine Qual Urine NEGATIVE NEGATIVE    Cannabinoids Qual Urine NEGATIVE NEGATIVE    Methamphetamine Qual NEGATIVE NEGATIVE    Methadone Qual NEGATIVE NEGATIVE    Morphine Qual NEGATIVE NEGATIVE    Oxycodone Qual POSITIVE (A) NEGATIVE    Temperature of Urine was Between  Degrees F YES YES   Lipid [place] ([facility])   Result Value Ref Range    Cholesterol 223.5 (H) 0.0 - 200.0 mg/dL    Cholesterol/HDL Ratio 5.7 (H) 0.0 - 5.0    HDL Cholesterol 39.0 (L) >40.0 mg/dL    LDL Cholesterol Calculated 124 0 - 129 mg/dL    Triglycerides 303.2 (H) 0.0 - 150.0 mg/dL    VLDL Cholesterol 60.6 (H) 7.0 - 32.0 mg/dL   Comprehensive Metabolic Panel ([facility])   Result Value Ref Range    Albumin 4.8 (H) 3.5 - 4.7 mg/dL    Alkaline Phosphatase 76.1 31.7 - 110.5 U/L    ALT 19.1 0.0 - 45.0 U/L    AST 18.8 0.0 - 45.0 U/L    Bilirubin Total <0.4 0.2 - 1.3 mg/dL    Urea Nitrogen 11.5 7.0 - 19.0 mg/dL    Calcium 9.4 8.5 - 10.1 mg/dL    Chloride 102.5 98.0 - 110.0 mmol/L    Carbon Dioxide 31.1 20.0 - 32.0 mmol/L    Creatinine 0.7 0.5 - 1.0 mg/dL    Glucose 101.8 (H) 70.0 - 99.0 mg'dL    Potassium 4.1 3.3 - 4.5 mmol/dL    Sodium 139.0 132.6 - 141.4 mmol/L    Protein Total 7.5 6.8 - 8.8 g/dL    GFR Estimate >90 >60.0 mL/min/1.7 m2    GFR Estimate If Black >90 >60.0 mL/min/1.7 m2           ASSESSMENT/PLAN    1. Chronic pain syndrome  2. Complex regional pain syndrome type 1 of left lower extremity  Stable    Utox - expected   - expected  FAQ - 57.5  Care Plan Date: August/2017  Current morphine equivalents/day = 37.5 mg   Naloxone - Yes    Refills x 3 months    - Rapid Urine Drug Screen ([facility])  - oxyCODONE (ROXICODONE) 5 MG tablet; Take 1 tablet (5 mg) by mouth every 4 hours as needed for moderate to severe pain  Dispense: 150 tablet;  Refill: 0        3. Hyperlipidemia with target LDL less than 130  Annual labs for lipids.   LDL worse.    - Lipid [place] ([facility])  - Comprehensive Metabolic Panel ([facility])    4. RTC 3 months        JANA DRIVER

## 2019-02-22 ENCOUNTER — TELEPHONE (OUTPATIENT)
Dept: FAMILY MEDICINE | Facility: CLINIC | Age: 64
End: 2019-02-22

## 2019-02-22 DIAGNOSIS — N39.0 URINARY TRACT INFECTION: Primary | ICD-10-CM

## 2019-02-22 RX ORDER — SULFAMETHOXAZOLE/TRIMETHOPRIM 800-160 MG
1 TABLET ORAL 2 TIMES DAILY
Qty: 6 TABLET | Refills: 0 | Status: SHIPPED | OUTPATIENT
Start: 2019-02-22 | End: 2019-04-24

## 2019-02-22 NOTE — TELEPHONE ENCOUNTER
RN spoke to patient to triage symptoms.  RN spoke with preceptor & was given permission to order UTI Rx per phone protocol. Informed patient that Rx was sent to pharmacy as requested.    Phone Protocol UTI evaluation and Treatment    Active medical problems:    Patient Active Problem List   Diagnosis     Sural neuritis     Saphenous neuritis     Abnormality of gait     Herpes simplex     Reflex sympathetic dystrophy of lower extremity     Insomnia     Plantar fasciitis     Menopausal syndrome (hot flashes)     Thyroid nodule     Anxiety     Hyperlipidemia with target LDL less than 130     Complex regional pain syndrome type 1 of left lower extremity     Gastroesophageal reflux disease without esophagitis     Chronic pain syndrome     Thyroid function test abnormal     Current Outpatient Medications   Medication Sig Dispense Refill     gabapentin (NEURONTIN) 600 MG tablet Take 2 tablets (1,200 mg) by mouth 3 times daily 540 tablet 3     Multiple Vitamins-Minerals (MULTIVITAMIN ADULT PO) Take by mouth daily       naloxone (NARCAN) nasal spray Spray 1 spray (4 mg) into one nostril alternating nostrils as needed for opioid reversal (every 2-3 minutes until assistance arrives.) 0.2 mL 1     [START ON 3/28/2019] oxyCODONE (ROXICODONE) 5 MG tablet Take 1 tablet (5 mg) by mouth every 4 hours as needed for moderate to severe pain 150 tablet 0     Probiotic Product (PROBIOTIC DAILY PO) Take by mouth daily       sertraline (ZOLOFT) 50 MG tablet Take 1 tablet (50 mg) by mouth daily 90 tablet 3     simvastatin (ZOCOR) 20 MG tablet Take 1 tablet (20 mg) by mouth At Bedtime 90 tablet 3     valACYclovir (VALTREX) 500 MG tablet Take 1 tablet (500 mg) by mouth daily 180 tablet 3     zolpidem (AMBIEN) 10 MG tablet Take 1 tablet (10 mg) by mouth nightly as needed for sleep 90 tablet 1       HPI:  Symptoms present for 1 days    Dysuria?:Yes  Frequency?: Yes  Urgency?: No  Blood in urine?: No  Incontinence?: No  Chills?: No  Nausea?:  no  LMP: Post menopausal  Eating and drinking normally. Same symptoms as UTI in the past.  No recent UTI.    COMPLICATING FACTORS -if any of the following is present patient does not qualify for phone treatment and should have  full evaluation  Pregnancy?: no   Diabetes?: no   Immunosuppression?: no   Renal calculi, abnormalities or CKD?: no   Urine Catheterization or procedure in last 2 weeks?: no   Recent or current resident of health care facility?: no     Flank pain ?: no   Fever>101?: no   Abdominal pain significant (does not include mild bladder pain)?:mild bladder pain only  Vomiting more than once?: no   Age <18 or >66 yo?: no     Vaginal discharge that is different than usual?: no       Assessment: UTI symptoms and no Complicating factors?Yes  If Yes then will treat as below if no will advise patient to come into clinic     Plan:  NO Sulfa Allergy: Septra DS one tablet twice daily for 3 days    Follow Up:  If worse or unable to tolerate medication call back. If no resolution of symptoms in 3 days follow up in clinic.

## 2019-02-22 NOTE — TELEPHONE ENCOUNTER
[facility] phone call message-patient reporting a symptom:     Symptom: Poss UTI    Same Day Visit Offered: Yes, declined    Additional comments: Per patient, she is unable to come into d/t constant urge to urinate. Would like a prescription sent to [facility] [id number] [address] [place] [address] [address]  OK to leave message on voice mail? Yes    Primary language: English      needed? No    Call taken on February 22, 2019 at 8:10 AM by Trinh Dickson

## 2019-02-22 NOTE — TELEPHONE ENCOUNTER
Patient calling to speak with nurse regarding symptoms and to possibly have a prescription sent to her pharmacy before the weekend. Please call back.

## 2019-04-24 ENCOUNTER — OFFICE VISIT (OUTPATIENT)
Dept: FAMILY MEDICINE | Facility: CLINIC | Age: 64
End: 2019-04-24
Payer: COMMERCIAL

## 2019-04-24 VITALS
OXYGEN SATURATION: 95 % | HEART RATE: 61 BPM | DIASTOLIC BLOOD PRESSURE: 85 MMHG | TEMPERATURE: 97.6 F | SYSTOLIC BLOOD PRESSURE: 124 MMHG

## 2019-04-24 DIAGNOSIS — G89.4 CHRONIC PAIN SYNDROME: Primary | ICD-10-CM

## 2019-04-24 DIAGNOSIS — G47.00 INSOMNIA, UNSPECIFIED TYPE: ICD-10-CM

## 2019-04-24 DIAGNOSIS — G90.522 COMPLEX REGIONAL PAIN SYNDROME TYPE 1 OF LEFT LOWER EXTREMITY: ICD-10-CM

## 2019-04-24 RX ORDER — OXYCODONE HYDROCHLORIDE 5 MG/1
5 TABLET ORAL EVERY 4 HOURS PRN
Qty: 150 TABLET | Refills: 0 | Status: SHIPPED | OUTPATIENT
Start: 2019-06-24 | End: 2019-07-24

## 2019-04-24 RX ORDER — OXYCODONE HYDROCHLORIDE 5 MG/1
5 TABLET ORAL EVERY 4 HOURS PRN
Qty: 150 TABLET | Refills: 0 | Status: SHIPPED | OUTPATIENT
Start: 2019-05-24 | End: 2019-04-24

## 2019-04-24 RX ORDER — OXYCODONE HYDROCHLORIDE 5 MG/1
5 TABLET ORAL EVERY 4 HOURS PRN
Qty: 150 TABLET | Refills: 0 | Status: SHIPPED | OUTPATIENT
Start: 2019-04-24 | End: 2019-04-24

## 2019-04-24 RX ORDER — ZOLPIDEM TARTRATE 10 MG/1
10 TABLET ORAL
Qty: 90 TABLET | Refills: 1 | Status: SHIPPED | OUTPATIENT
Start: 2019-04-24 | End: 2019-09-18

## 2019-04-24 NOTE — PROGRESS NOTES
HPI  63 year old female here for evaluation of several issues.    1. Chronic Pain  Patient with chronic regional pain syndrome pain continues in her left leg and foot.  There is been some tingling in the right toes but it is not gone beyond that point.  Feet are cold frequently.  There is some color changes with hot water.  She is stable with her oxycodone dose.  She also uses gabapentin.      Discussed other Means of controlling her chronic pain.  She is exercising 5 days a week in the pool.  Applauded this care.  She continues on Zoloft 50 mg daily.  She has a care plan.      2. Insomnia  Uses Ambien 10 mg nightly.  She is due for a refill.  Discussed going down on the dose from 10 mg to 5 mg.  She will try.            ROS  6 point ROS neg other than the symptoms noted above in the HPI.    MEDS  Current Outpatient Medications   Medication     gabapentin (NEURONTIN) 600 MG tablet     Multiple Vitamins-Minerals (MULTIVITAMIN ADULT PO)     naloxone (NARCAN) nasal spray     oxyCODONE (ROXICODONE) 5 MG tablet     Probiotic Product (PROBIOTIC DAILY PO)     sertraline (ZOLOFT) 50 MG tablet     simvastatin (ZOCOR) 20 MG tablet     valACYclovir (VALTREX) 500 MG tablet     zolpidem (AMBIEN) 10 MG tablet     No current facility-administered medications for this visit.          SOC      FHx  Family History   Problem Relation Age of Onset     Cerebrovascular Disease Mother      Diabetes Father      Diabetes Maternal Grandfather      C.A.D. Maternal Grandfather      Myocardial Infarction Maternal Grandfather      Alcohol/Drug Maternal Grandfather      Cerebrovascular Disease Paternal Grandmother        PHYSICAL EXAMINATION:  Vital signs: /85   Pulse 61   Temp 97.6  F (36.4  C) (Oral)   SpO2 95%     Wt Readings from Last 5 Encounters:   01/28/19 81.3 kg (179 lb 3.2 oz)   10/29/18 81.5 kg (179 lb 9.6 oz)   08/20/18 84.2 kg (185 lb 9.6 oz)   07/23/18 86 kg (189 lb 9.6 oz)   04/30/18 83.4 kg (183 lb 12.8 oz)         Gen:  no distress  Ext: no edema, cool to touch. No dysethesias today. No color changes at this time.        LABS  Results for orders placed or performed in visit on 04/24/19 (from the past 24 hour(s))   Rapid Urine Drug Screen ([facility])   Result Value Ref Range    Phencyclidine NEGATIVE -ATIVE    Propoxyphene NEGATIVE -ATIVE    Tricyclic Antidepressants NEGATIVE -ATIVE    Amphetamines Qual NEGATIVE -ATIVE    Barbiturates Qual Urine NEGATIVE -ATIVE    Buprenorphine Qual Urine NEGATIVE -ATIVE    Benzodiazepine Qual Urine NEGATIVE -ATIVE    Cocaine Qual Urine NEGATIVE -ATIVE    Cannabinoids Qual Urine NEGATIVE -ATIVE    Methamphetamine Qual NEGATIVE -ATIVE    Methadone Qual NEGATIVE -ATIVE    Morphine Qual NEGATIVE -ATIVE    Oxycodone Qual POSITIVE (A) -ATIVE    Temperature of Urine was Between  Degrees F YES YES         ASSESSMENT/PLAN      1. Chronic pain syndrome  2. Complex regional pain syndrome type 1 of left lower extremity  Utox - expected   - expected  FAQ - 60  Care Plan Date: August/2017  Current morphine equivalents/day = 37.5 mg   Naloxone - Yes     Refill of oxycodone x 3 months    - Rapid Urine Drug Screen ([facility])  - oxyCODONE (ROXICODONE) 5 MG tablet; Take 1 tablet (5 mg) by mouth every 4 hours as needed for moderate to severe pain  Dispense: 150 tablet; Refill: 0        3. Insomnia, unspecified type  Try decreasing dose to 5 mg.    - zolpidem (AMBIEN) 10 MG tablet; Take 1 tablet (10 mg) by mouth nightly as needed for sleep  Dispense: 90 tablet; Refill: 1    4. RTC 3 months      JANA DRIVER

## 2019-07-15 DIAGNOSIS — F41.9 ANXIETY: ICD-10-CM

## 2019-07-19 ENCOUNTER — OFFICE VISIT (OUTPATIENT)
Dept: FAMILY MEDICINE | Facility: CLINIC | Age: 64
End: 2019-07-19
Payer: COMMERCIAL

## 2019-07-19 VITALS
BODY MASS INDEX: 27.6 KG/M2 | HEART RATE: 55 BPM | TEMPERATURE: 97.6 F | DIASTOLIC BLOOD PRESSURE: 82 MMHG | OXYGEN SATURATION: 97 % | WEIGHT: 160.8 LBS | SYSTOLIC BLOOD PRESSURE: 125 MMHG

## 2019-07-19 DIAGNOSIS — M75.82 TENDINITIS OF LEFT ROTATOR CUFF: ICD-10-CM

## 2019-07-19 DIAGNOSIS — L25.9 CONTACT DERMATITIS, UNSPECIFIED CONTACT DERMATITIS TYPE, UNSPECIFIED TRIGGER: Primary | ICD-10-CM

## 2019-07-19 RX ORDER — TRIAMCINOLONE ACETONIDE 1 MG/G
CREAM TOPICAL 2 TIMES DAILY
Qty: 453.6 G | Refills: 0 | Status: SHIPPED | OUTPATIENT
Start: 2019-07-19 | End: 2019-10-14

## 2019-07-19 NOTE — PROGRESS NOTES
KAYLEY Mayo is a 63 year old  who presents for   Chief Complaint   Patient presents with     Derm Problem     x 2 months     Has had a rash on her chest, back, and groin for 2 months. Rash is raised and burning in nature, slightly itchy. She does also have bilateral elbow and left shoulder pains as well. No fevers, chills, joint swelling, abdominal pain, N/V. No facial rash. No known tick exposure or recent travel. No new clothes or detergents. She swims 2 hours a day, five days a week and has been for the past few months to lose weight which she has done successfully. The pool is a salt water pool. She does have a new swimsuit, but her rash started prior to getting this suit. Has not tried anything for the rash. She was hoping to make it until her next visit with PCP, but the rash on her back has gotten larger so she decided to come in.     +++++++    Problem, Medication and Allergy Lists were reviewed and updated if needed..    Patient is an established patient of this clinic..         Review of Systems:   Review of Systems   10 point ROS negative except as noted above.         Physical Exam:     Vitals:    07/19/19 1504   BP: 125/82   Pulse: 55   Temp: 97.6  F (36.4  C)   TempSrc: Oral   SpO2: 97%   Weight: 72.9 kg (160 lb 12.8 oz)     Body mass index is 27.6 kg/m .  Vitals were reviewed and were normal     Physical Exam   Constitutional: She is oriented to person, place, and time. She appears well-developed and well-nourished. No distress.   HENT:   Head: Normocephalic and atraumatic.   Eyes: Conjunctivae are normal. No scleral icterus.   Cardiovascular: Intact distal pulses.   Pulmonary/Chest: Effort normal. No respiratory distress.   Musculoskeletal: She exhibits tenderness (over bilateral lateral elbows). She exhibits no edema.   Neurological: She is alert and oriented to person, place, and time.   Skin: Skin is warm and dry. Rash (raised erythematous rash 4 cm long over sternum; symmetric  large erythematous raised rash over sacrum) noted. No erythema.        Psychiatric: She has a normal mood and affect.   Left shoulder:  Inspection: no swelling, bruising, discoloration, or obvious deformity or asymmetry  Tender: none  Non-tender: proximal-mid clavicle, mid-distal clavicle, AC joint, proximal bicep tendon, proximal humerus, supraspinatus  and upper trapezius muscle  Range of Motion  Active: normal  Passive: all normal  Strength: rotator cuff strength full  Special tests:  Positive: Pain with subscapular testing with hand behind back attempting to push out away from body        Results:   No testing ordered today    Assessment and Plan        Hailee was seen today for derm problem.    Diagnoses and all orders for this visit:    Contact dermatitis, unspecified contact dermatitis type, unspecified trigger  Uncertain trigger for dermatitis; no new soaps, clothes or detergents. Appears to be contact in nature, though.   -     triamcinolone (KENALOG) 0.1 % external cream; Apply topically 2 times daily    Tendinitis of left rotator cuff  Tender with testing of subscapularis, but strength full. Likely triggered by increased frequency of exercise lately. Recommended continued use with swimming in the pool, but less weights. Discussed physical therapy but patient declined at this time as she has done this before in the past without significant results.     There are no discontinued medications.    Options for treatment and follow-up care were reviewed with the patient. Hailee Mayo  engaged in the decision making process and verbalized understanding of the options discussed and agreed with the final plan.    Arnav Muñiz MD

## 2019-07-19 NOTE — PATIENT INSTRUCTIONS
Here is the plan from today's visit    1. Contact dermatitis, unspecified contact dermatitis type, unspecified trigger  - triamcinolone (KENALOG) 0.1 % external cream; Apply topically 2 times daily  Dispense: 453.6 g; Refill: 0    Please call or return to clinic if your symptoms don't go away.    Follow up plan  Please make a clinic appointment for follow up with your primary physician Kaleb Mcelroy MD in 5 days for follow up appointment.    Thank you for coming to [facility] today.  Lab Testing:  **If you had lab testing today and your results are reassuring or normal they will be mailed to you or sent through Mobile [facility] 7 within 7 days.   **If the lab tests need quick action we will call you with the results.  The phone number we will call with results is # [phone] (home) none (work). If this is not the best number please call our clinic and change the number.  Medication Refills:  If you need any refills please call your pharmacy and they will contact us.   If you need to  your refill at a new pharmacy, please contact the new pharmacy directly. The new pharmacy will help you get your medications transferred faster.   Scheduling:  If you have any concerns about today's visit or wish to schedule another appointment please call our office during normal business hours [phone] (8-5:00 M-F)  If a referral was made to a [facility] and you don't get a call from central scheduling please call [phone].  If a Mammogram was ordered for you at [facility] call [phone] to schedule or change your appointment.  If you had an XRay/CT/Ultrasound/MRI ordered the number is [phone] to schedule or change your radiology appointment.   Medical Concerns:  If you have urgent medical concerns please call [phone] at any time of the day.    Arnav Muñiz MD

## 2019-07-22 DIAGNOSIS — G90.50 REFLEX SYMPATHETIC DYSTROPHY: ICD-10-CM

## 2019-07-22 NOTE — TELEPHONE ENCOUNTER
"Request for medication refill: Neurontin 600mg    Providers if patient needs an appointment and you are willing to give a one month supply please refill for one month and  send a letter/MyChart using \".SMILLIMITEDREFILL\" .smillimited and route chart to \"P SMI \" (Giving one month refill in non controlled medications is strongly recommended before denial)    If refill has been denied, meaning absolutely no refills without visit, please complete the smart phrase \".smirxrefuse\" and route it to the \"P SMI MED REFILLS\"  pool to inform the patient and the pharmacy.    Simran Jensen, CMA        "

## 2019-07-23 RX ORDER — GABAPENTIN 600 MG/1
1200 TABLET ORAL 3 TIMES DAILY
Qty: 540 TABLET | Refills: 3 | Status: SHIPPED | OUTPATIENT
Start: 2019-07-23 | End: 2020-06-22

## 2019-07-24 ENCOUNTER — OFFICE VISIT (OUTPATIENT)
Dept: FAMILY MEDICINE | Facility: CLINIC | Age: 64
End: 2019-07-24
Payer: COMMERCIAL

## 2019-07-24 VITALS
OXYGEN SATURATION: 98 % | WEIGHT: 159.8 LBS | HEIGHT: 64 IN | DIASTOLIC BLOOD PRESSURE: 70 MMHG | HEART RATE: 74 BPM | TEMPERATURE: 97.8 F | BODY MASS INDEX: 27.28 KG/M2 | SYSTOLIC BLOOD PRESSURE: 102 MMHG

## 2019-07-24 DIAGNOSIS — G89.4 CHRONIC PAIN SYNDROME: Primary | ICD-10-CM

## 2019-07-24 RX ORDER — OXYCODONE HYDROCHLORIDE 5 MG/1
5 TABLET ORAL EVERY 4 HOURS PRN
Qty: 150 TABLET | Refills: 0 | Status: SHIPPED | OUTPATIENT
Start: 2019-07-24 | End: 2019-08-21

## 2019-07-24 ASSESSMENT — MIFFLIN-ST. JEOR: SCORE: 1264.85

## 2019-07-24 NOTE — PROGRESS NOTES
"HPI  63 year old female here for evaluation of several issues.      1. Chronic Pain  Reflex sympathetic dystrophy is stable.  If anything it is slightly better with her continued exercise and weight loss.  Discussed new state laws that prohibit us from doing a 3-month supplies.  Functionally doing well      2. Wt loss  #20 wt loss  Pool exercise 7-9a m 5days/week  nutri-system diet  Applauded care          ROS  6 point ROS neg other than the symptoms noted above in the HPI.    MEDS  Current Outpatient Medications   Medication     gabapentin (NEURONTIN) 600 MG tablet     Multiple Vitamins-Minerals (MULTIVITAMIN ADULT PO)     oxyCODONE (ROXICODONE) 5 MG tablet     Probiotic Product (PROBIOTIC DAILY PO)     sertraline (ZOLOFT) 50 MG tablet     simvastatin (ZOCOR) 20 MG tablet     triamcinolone (KENALOG) 0.1 % external cream     valACYclovir (VALTREX) 500 MG tablet     zolpidem (AMBIEN) 10 MG tablet     naloxone (NARCAN) nasal spray     No current facility-administered medications for this visit.          SOC      FHx  Family History   Problem Relation Age of Onset     Cerebrovascular Disease Mother      Diabetes Father      Diabetes Maternal Grandfather      C.A.D. Maternal Grandfather      Myocardial Infarction Maternal Grandfather      Alcohol/Drug Maternal Grandfather      Cerebrovascular Disease Paternal Grandmother        PHYSICAL EXAMINATION:  Vital signs: /70   Pulse 74   Temp 97.8  F (36.6  C) (Oral)   Ht 1.626 m (5' 4\")   Wt 72.5 kg (159 lb 12.8 oz)   SpO2 98%   BMI 27.43 kg/m      Gen: no distress  Ext: no edema, temperature normal, good cap refill, no tenderness in the calf muscles        LABS  Results for orders placed or performed in visit on 07/24/19 (from the past 24 hour(s))   Rapid Urine Drug Screen ([facility])   Result Value Ref Range    Phencyclidine NEGATIVE NEGATIVE    Propoxyphene NEGATIVE NEGATIVE    Tricyclic Antidepressants NEGATIVE NEGATIVE    Amphetamines Qual NEGATIVE NEGATIVE    " Barbiturates Qual Urine NEGATIVE NEGATIVE    Buprenorphine Qual Urine NEGATIVE NEGATIVE    Benzodiazepine Qual Urine NEGATIVE NEGATIVE    Cocaine Qual Urine NEGATIVE NEGATIVE    Cannabinoids Qual Urine NEGATIVE NEGATIVE    Methamphetamine Qual NEGATIVE NEGATIVE    Methadone Qual NEGATIVE NEGATIVE    Morphine Qual NEGATIVE NEGATIVE    Oxycodone Qual POSITIVE (A) NEGATIVE    Temperature of Urine was Between  Degrees F YES YES         ASSESSMENT/PLAN    1. Chronic pain syndrome  Utox - expected   - expected  FAQ - 60  Care Plan Date: August/2017  Current morphine equivalents/day = 37.5 mg   Naloxone - Yes    - Rapid Urine Drug Screen ([facility])  - oxyCODONE (ROXICODONE) 5 MG tablet; Take 1 tablet (5 mg) by mouth every 4 hours as needed for moderate to severe pain  Dispense: 150 tablet; Refill: 0    2. Intentional Wt Loss   20# - diet and exercise!!  Applaud care    3. RTC 1 month for pain anand BATES MD

## 2019-07-29 NOTE — PROGRESS NOTES
Preceptor Attestation:   Patient seen, evaluated and discussed with the resident. I have verified the content of the note, which accurately reflects my assessment of the patient and the plan of care.   Supervising Physician:  Shaheen Patrick MD

## 2019-08-21 ENCOUNTER — OFFICE VISIT (OUTPATIENT)
Dept: FAMILY MEDICINE | Facility: CLINIC | Age: 64
End: 2019-08-21
Payer: COMMERCIAL

## 2019-08-21 VITALS
OXYGEN SATURATION: 97 % | SYSTOLIC BLOOD PRESSURE: 105 MMHG | DIASTOLIC BLOOD PRESSURE: 75 MMHG | BODY MASS INDEX: 26.09 KG/M2 | HEIGHT: 64 IN | TEMPERATURE: 97.4 F | WEIGHT: 152.8 LBS | HEART RATE: 63 BPM

## 2019-08-21 DIAGNOSIS — G89.4 CHRONIC PAIN SYNDROME: Primary | ICD-10-CM

## 2019-08-21 DIAGNOSIS — R21 RASH: ICD-10-CM

## 2019-08-21 RX ORDER — OXYCODONE HYDROCHLORIDE 5 MG/1
5 TABLET ORAL EVERY 4 HOURS PRN
Qty: 150 TABLET | Refills: 0 | Status: SHIPPED | OUTPATIENT
Start: 2019-08-21 | End: 2019-09-18

## 2019-08-21 ASSESSMENT — MIFFLIN-ST. JEOR: SCORE: 1233.1

## 2019-08-21 NOTE — PATIENT INSTRUCTIONS
2. Rash  Expect a call to schedule a dermatology appontment    - DERMATOLOGY REFERRAL - INTERNAL    3. Chronic pain syndrome  - oxyCODONE (ROXICODONE) 5 MG tablet; Take 1 tablet (5 mg) by mouth every 4 hours as needed for moderate to severe pain  Dispense: 150 tablet; Refill: 0

## 2019-08-21 NOTE — PROGRESS NOTES
"HPI  63 year old female here for evaluation of several issues.    1. Chronic Pain  Continues to use oxycodone the same pattern as before.  Continues her exercise program and has lost 5 pounds  Continues with her Zoloft for depression which is working well.   Database - expected   tox screen - expected    2. Wt loss  Continues her exercise program.  He lost 28 pounds total.  She is expecting to lose more.  Applauded this great care.    3. Rash  Has a rash on her chest wall.  Lesion that does not seem to heal.  She does not believe it is due to the chlorine in the water or her swimming suits even though this is an area of contact.  It is slightly improved with triamcinolone cream but is not resolving.  Because of the non-healing lesion will have her see dermatology      4. Refills  Needs several refills.          ROS  6 point ROS neg other than the symptoms noted above in the HPI.    MEDS  Current Outpatient Medications   Medication     gabapentin (NEURONTIN) 600 MG tablet     Multiple Vitamins-Minerals (MULTIVITAMIN ADULT PO)     naloxone (NARCAN) nasal spray     oxyCODONE (ROXICODONE) 5 MG tablet     Probiotic Product (PROBIOTIC DAILY PO)     sertraline (ZOLOFT) 50 MG tablet     simvastatin (ZOCOR) 20 MG tablet     triamcinolone (KENALOG) 0.1 % external cream     valACYclovir (VALTREX) 500 MG tablet     zolpidem (AMBIEN) 10 MG tablet     No current facility-administered medications for this visit.          SOC      FHx  Family History   Problem Relation Age of Onset     Cerebrovascular Disease Mother      Diabetes Father      Diabetes Maternal Grandfather      C.A.D. Maternal Grandfather      Myocardial Infarction Maternal Grandfather      Alcohol/Drug Maternal Grandfather      Cerebrovascular Disease Paternal Grandmother        PHYSICAL EXAMINATION:  Vital signs: /75   Pulse 63   Temp 97.4  F (36.3  C) (Oral)   Ht 1.626 m (5' 4\")   Wt 69.3 kg (152 lb 12.8 oz)   SpO2 97%   BMI 26.23 kg/m      Gen: no " distress  Skin: 2cm nonhealing lesion on R anterior chest wall. Erythema on L upper chest wall  Light erythema on low back  Ext: no edema, no coloration changes, pulses good, sensation - not dysethesia        LABS  Results for orders placed or performed in visit on 08/21/19   Rapid Urine Drug Screen ([facility])   Result Value Ref Range    Phencyclidine NEGATIVE NEGATIVE    Propoxyphene NEGATIVE NEGATIVE    Tricyclic Antidepressants NEGATIVE NEGATIVE    Amphetamines Qual NEGATIVE NEGATIVE    Barbiturates Qual Urine NEGATIVE NEGATIVE    Buprenorphine Qual Urine NEGATIVE NEGATIVE    Benzodiazepine Qual Urine NEGATIVE NEGATIVE    Cocaine Qual Urine NEGATIVE NEGATIVE    Cannabinoids Qual Urine NEGATIVE NEGATIVE    Methamphetamine Qual NEGATIVE NEGATIVE    Methadone Qual NEGATIVE NEGATIVE    Morphine Qual NEGATIVE NEGATIVE    Oxycodone Qual POSITIVE (A) NEGATIVE    Temperature of Urine was Between  Degrees F YES YES           ASSESSMENT/PLAN    1. Chronic pain syndrome  Utox - expected   - expected  FAQ - 60  Care Plan Date: August/2017  Current morphine equivalents/day = 37.5 mg   Naloxone - Yes    - Rapid Urine Drug Screen ([facility])  - oxyCODONE (ROXICODONE) 5 MG tablet; Take 1 tablet (5 mg) by mouth every 4 hours as needed for moderate to severe pain  Dispense: 150 tablet; Refill: 0    2. Rash  Non healing area on R anterior chest  Rash not responsive to TCM  Refer to derm    - DERMATOLOGY REFERRAL - AMBREEN BATES MD

## 2019-08-26 ENCOUNTER — TELEPHONE (OUTPATIENT)
Dept: DERMATOLOGY | Facility: CLINIC | Age: 64
End: 2019-08-26

## 2019-08-26 NOTE — TELEPHONE ENCOUNTER
[facility] Call Center    Phone Message    May a detailed message be left on voicemail: yes    Reason for Call: Other: Patient has a referral for a rash and would like to schedule an appt. Please advise.     Action Taken: Message routed to:  Adult Clinics: Dermatology p [phone]

## 2019-08-26 NOTE — TELEPHONE ENCOUNTER
Called pt to schedule appointment for rash. Pt explained She has been experiencing this rash since May. Pt explained she's been using triamcinolone 0.1% and seeing little improvement. Rash appears on upper chest, inguinal creases, and lower back. Pt states rash burns and sometimes peels and is dark red. Pt states she has done a swimming class in a salt water pool for 3 years and wonders if this may have anything to do with it, she has changed swimsuits multiple times and wears a bandage on her chest attempting to prevent rash. Patient scheduled for 8/27/19 at 1:00 PM.      Rosi Timmons LPN

## 2019-08-27 ENCOUNTER — OFFICE VISIT (OUTPATIENT)
Dept: DERMATOLOGY | Facility: CLINIC | Age: 64
End: 2019-08-27
Payer: MEDICARE

## 2019-08-27 DIAGNOSIS — R21 RASH/SKIN ERUPTION: Primary | ICD-10-CM

## 2019-08-27 PROCEDURE — 88305 TISSUE EXAM BY PATHOLOGIST: CPT | Mod: TC | Performed by: DERMATOLOGY

## 2019-08-27 PROCEDURE — 99213 OFFICE O/P EST LOW 20 MIN: CPT | Mod: 25 | Performed by: DERMATOLOGY

## 2019-08-27 PROCEDURE — 11104 PUNCH BX SKIN SINGLE LESION: CPT | Performed by: DERMATOLOGY

## 2019-08-27 ASSESSMENT — PAIN SCALES - GENERAL: PAINLEVEL: MILD PAIN (2)

## 2019-08-27 NOTE — NURSING NOTE
Hailee Mayo's goals for this visit include:   Chief Complaint   Patient presents with     Rash     chest, lower back, and inguinal creases. red and burning       She requests these members of her care team be copied on today's visit information: Yes    PCP: Kaleb Mcelroy    Referring Provider:  No referring provider defined for this encounter.    There were no vitals taken for this visit.    Do you need any medication refills at today's visit? NO    Rosi Timmons LPN

## 2019-08-27 NOTE — PROGRESS NOTES
"[facility] Dermatology Note      Dermatology Problem List:  1. SK  - patient previously declined cryotherapy  2. Hyperkeratosis  - current t/x: Urea 40% cream nightly   3. Rash/Eruption: pink blanching papules and macules at the lower back, inguinal creases, and central chest:  - s/p biopsy 8/27/19  - previous t/x: TAC 0.01% cream (no improvement)    Encounter Date: Aug 27, 2019    CC:  Chief Complaint   Patient presents with     Rash     chest, lower back, and inguinal creases. red and burning       History of Present Illness:  Ms. Stephen \"BRI\" Maria Esther is a 63 year old female who presents in self referral for a rash of concern. The patient was last seen by Dr. Benavides on 1/9/18 for treatment of hyperkeratosis with otc Urea 40% cream and identification of SKs. Today she reports that she has had this rash since May. Believes it to be from salt water pool, but notes she has been getting in this pool for years without issue. Rash is present on her chest, lower back and groin. She states that it will peel occasionally and causes irritation. It burns. She is concerned some areas of rash are where her swimsuit hits on her skin, but other areas do not seem to come into contact.  She has been swimming in this pool for several years, but this is the first year she has had this rash. She denies chlorine at this pool. She was previously treated with TAC 0.1% cream from her PCP. She thinks this treatment has been minimally if at all helpful. No other lesions of concern today.    Past Medical History:   Patient Active Problem List   Diagnosis     Sural neuritis     Saphenous neuritis     Abnormality of gait     Herpes simplex     Reflex sympathetic dystrophy of lower extremity     Insomnia     Plantar fasciitis     Menopausal syndrome (hot flashes)     Thyroid nodule     Anxiety     Hyperlipidemia with target LDL less than 130     Complex regional pain syndrome type 1 of left lower extremity     " Gastroesophageal reflux disease without esophagitis     Chronic pain syndrome     Thyroid function test abnormal     Past Medical History:   Diagnosis Date     Complex regional pain syndrome      Sleep problems      Sore throat      Trouble swallowing      Past Surgical History:   Procedure Laterality Date     ------------OTHER-------------      Surgery to lengthen Left calf muscle     BUNIONECTOMY      Both feet     COLONOSCOPY N/A 9/7/2016    Procedure: COMBINED COLONOSCOPY, SINGLE OR MULTIPLE BIOPSY/POLYPECTOMY BY BIOPSY;  Surgeon: Duane, William Charles, MD;  Location: [facility] OR     COLONOSCOPY WITH CO2 INSUFFLATION N/A 9/7/2016    Procedure: COLONOSCOPY WITH CO2 INSUFFLATION;  Surgeon: Duane, William Charles, MD;  Location: [facility] OR     GYN SURGERY      uterine polyp removed     LARYNGOSCOPY, ESOPHAGOSCOPY,  BIOPSY, COMBINED  5/20/2013    Procedure: COMBINED LARYNGOSCOPY, ESOPHAGOSCOPY,  BIOPSY;  Direct Laryngoscopy , Esophagoscopy;  Surgeon: Tawanda Bryson MD;  Location: [facility] OR     LUMPECTOMY BREAST BILATERAL       ORTHOPEDIC SURGERY      bunionectomy     TONSILLECTOMY         Social History:  The patient is retired, used to work as a . The patient denies use of tanning beds. The patient does not drink alcohol, smoke or use tobacco. Likes to swim/walk in salt water pool    Family History:  There is no family history of skin cancer.    Medications:  Current Outpatient Medications   Medication Sig Dispense Refill     gabapentin (NEURONTIN) 600 MG tablet Take 2 tablets (1,200 mg) by mouth 3 times daily 540 tablet 3     Multiple Vitamins-Minerals (MULTIVITAMIN ADULT PO) Take by mouth daily       naloxone (NARCAN) nasal spray Spray 1 spray (4 mg) into one nostril alternating nostrils as needed for opioid reversal (every 2-3 minutes until assistance arrives.) (Patient not taking: Reported on 7/19/2019) 0.2 mL 1     oxyCODONE (ROXICODONE) 5 MG tablet Take 1 tablet (5 mg) by mouth every 4 hours  as needed for moderate to severe pain 150 tablet 0     Probiotic Product (PROBIOTIC DAILY PO) Take by mouth daily       sertraline (ZOLOFT) 50 MG tablet Take 1 tablet (50 mg) by mouth daily 90 tablet 3     simvastatin (ZOCOR) 20 MG tablet Take 1 tablet (20 mg) by mouth At Bedtime 90 tablet 3     triamcinolone (KENALOG) 0.1 % external cream Apply topically 2 times daily 453.6 g 0     valACYclovir (VALTREX) 500 MG tablet Take 1 tablet (500 mg) by mouth daily 180 tablet 3     zolpidem (AMBIEN) 10 MG tablet Take 1 tablet (10 mg) by mouth nightly as needed for sleep 90 tablet 1       Allergies   Allergen Reactions     Hydrocodone      Penicillins      Vicodin [Hydrocodone-Acetaminophen] Other (See Comments)     Burning in abd     Review of Systems:  -Constitutional: Patient is otherwise feeling well, in usual state of health.   -Skin: As above in HPI. No additional skin concerns.      Physical exam:  Vitals: There were no vitals taken for this visit.  GEN: This is a well developed, well-nourished female in no acute distress, in a pleasant mood.   SKIN: Focused examination of the face, neck, chest, back, upper buttocks, groin, upper extremities was completed.   - Kapadia Type I-II  - Pink follicular based macules that marimar from pressure on medial aspects of lower back and buttocks. Central portion of back is entirely clear. Similar blanching pink macules in the inguinal creases where underwear elastic comes into contact with the skin.  - U shaped band on central chest with pink slightly shiny macules that marimar to pressure. They most coalesce together at the central chest   - No other lesions of concern on areas examined.               Impression/Plan:    1. Rash/Eruption. Based on description and pain, I suspect this is an irritant contact dermatitis or allergic contact dermatitis (possibly to elastic), but patient does not have the characteristic crusting I would expect for an eczematous dermatitis. This could be  because it has been partially treated with TAC 0.1% cream. Because I am not sure of the cause, I recommended biopsy today. patient agreeable to this. Will base treatment plan on biopsy results.     Punch biopsy:  After discussion of benefits and risks including but not limited to bleeding/bruising, pain/swelling, infection, scar, incomplete removal, nerve damage/numbness, recurrence, and non-diagnostic biopsy, written consent, verbal consent and photographs were obtained. Time-out was performed. The area was cleaned with isopropyl alcohol. 0.5mL of 1% lidocaine with 1:100,000 epinephrine was injected to obtain adequate anesthesia of the lesion on the central chest.  A 4 mm punch biopsy was performed. 4-0 ethilon sutures were utilized to approximate the epidermal edges. White petroleum jelly/Vaseline and a bandage was applied to the wound. Explicit verbal and written wound care instructions were provided. The patient left the Dermatology Clinic in good condition. The patient was counseled to remove the suture in approximately 14 days. She was given suture removal kit.     Patient will follow-up for treatment pending biopsy results and further treatment.    Advised patient to avoid the pool for a full 48 hours or if wants to talk in water keep area covered with water proof bandage.       Follow-up pending biopsy results, earlier for new or changing lesions.     Staff Involved:  Scribe/Staff    Scribe Disclosure  I, Estrella Bullard, am serving as a scribe to document services personally performed by Dr. Marli Szymanski MD, based on data collection and the provider's statements to me.     Provider Disclosure:   The documentation recorded by the scribe accurately reflects the services I personally performed and the decisions made by me.    Marli Szymanski MD    Department of Dermatology  [facility]: Phone: [phone],  Fax:[phone]  [facility]: Phone: [phone], Fax: [phone]

## 2019-08-27 NOTE — NURSING NOTE
The following medication was given:     MEDICATION:  Lidocaine with epinephrine 1% 1:[id number]  ROUTE: SQ  SITE: see procedure note  DOSE: 0.5 cc  LOT #: -[id number]  : [employer]  EXPIRATION DATE: Dec. 2019  NDC#: 9816-4288-57   Was there drug waste? Yes, 2.5 cc  Multi-dose vial: Yes    Rosi Timmons LPN  August 27, 2019

## 2019-08-27 NOTE — LETTER
"    8/27/2019         RE: Hailee Mayo  [id number] [address] [place] [id number]        Dear Colleague,    Thank you for referring your patient, Hailee Mayo, to the [facility]. Please see a copy of my visit note below.    [facility] Dermatology Note      Dermatology Problem List:  1. SK  - patient previously declined cryotherapy  2. Hyperkeratosis  - current t/x: Urea 40% cream nightly   3. Rash/Eruption: pink blanching papules and macules at the lower back, inguinal creases, and central chest:  - s/p biopsy 8/27/19  - previous t/x: TAC 0.01% cream (no improvement)    Encounter Date: Aug 27, 2019    CC:  Chief Complaint   Patient presents with     Rash     chest, lower back, and inguinal creases. red and burning       History of Present Illness:  Ms. Stephen \"BRI\" Maria Esther is a 63 year old female who presents in self referral for a rash of concern. The patient was last seen by Dr. Benavides on 1/9/18 for treatment of hyperkeratosis with otc Urea 40% cream and identification of SKs. Today she reports that she has had this rash since May. Believes it to be from salt water pool, but notes she has been getting in this pool for years without issue. Rash is present on her chest, lower back and groin. She states that it will peel occasionally and causes irritation. It burns. She is concerned some areas of rash are where her swimsuit hits on her skin, but other areas do not seem to come into contact.  She has been swimming in this pool for several years, but this is the first year she has had this rash. She denies chlorine at this pool. She was previously treated with TAC 0.1% cream from her PCP. She thinks this treatment has been minimally if at all helpful. No other lesions of concern today.    Past Medical History:   Patient Active Problem List   Diagnosis     Sural neuritis     Saphenous neuritis     Abnormality of gait     Herpes simplex     Reflex sympathetic dystrophy of lower " extremity     Insomnia     Plantar fasciitis     Menopausal syndrome (hot flashes)     Thyroid nodule     Anxiety     Hyperlipidemia with target LDL less than 130     Complex regional pain syndrome type 1 of left lower extremity     Gastroesophageal reflux disease without esophagitis     Chronic pain syndrome     Thyroid function test abnormal     Past Medical History:   Diagnosis Date     Complex regional pain syndrome      Sleep problems      Sore throat      Trouble swallowing      Past Surgical History:   Procedure Laterality Date     ------------OTHER-------------      Surgery to lengthen Left calf muscle     BUNIONECTOMY      Both feet     COLONOSCOPY N/A 9/7/2016    Procedure: COMBINED COLONOSCOPY, SINGLE OR MULTIPLE BIOPSY/POLYPECTOMY BY BIOPSY;  Surgeon: Duane, William Charles, MD;  Location: [facility] OR     COLONOSCOPY WITH CO2 INSUFFLATION N/A 9/7/2016    Procedure: COLONOSCOPY WITH CO2 INSUFFLATION;  Surgeon: Duane, William Charles, MD;  Location: [facility] OR     GYN SURGERY      uterine polyp removed     LARYNGOSCOPY, ESOPHAGOSCOPY,  BIOPSY, COMBINED  5/20/2013    Procedure: COMBINED LARYNGOSCOPY, ESOPHAGOSCOPY,  BIOPSY;  Direct Laryngoscopy , Esophagoscopy;  Surgeon: Tawanda Bryson MD;  Location: [facility] OR     LUMPECTOMY BREAST BILATERAL       ORTHOPEDIC SURGERY      bunionectomy     TONSILLECTOMY         Social History:  The patient is retired, used to work as a . The patient denies use of tanning beds. The patient does not drink alcohol, smoke or use tobacco. Likes to swim/walk in salt water pool    Family History:  There is no family history of skin cancer.    Medications:  Current Outpatient Medications   Medication Sig Dispense Refill     gabapentin (NEURONTIN) 600 MG tablet Take 2 tablets (1,200 mg) by mouth 3 times daily 540 tablet 3     Multiple Vitamins-Minerals (MULTIVITAMIN ADULT PO) Take by mouth daily       naloxone (NARCAN) nasal spray Spray 1 spray (4 mg) into one nostril  alternating nostrils as needed for opioid reversal (every 2-3 minutes until assistance arrives.) (Patient not taking: Reported on 7/19/2019) 0.2 mL 1     oxyCODONE (ROXICODONE) 5 MG tablet Take 1 tablet (5 mg) by mouth every 4 hours as needed for moderate to severe pain 150 tablet 0     Probiotic Product (PROBIOTIC DAILY PO) Take by mouth daily       sertraline (ZOLOFT) 50 MG tablet Take 1 tablet (50 mg) by mouth daily 90 tablet 3     simvastatin (ZOCOR) 20 MG tablet Take 1 tablet (20 mg) by mouth At Bedtime 90 tablet 3     triamcinolone (KENALOG) 0.1 % external cream Apply topically 2 times daily 453.6 g 0     valACYclovir (VALTREX) 500 MG tablet Take 1 tablet (500 mg) by mouth daily 180 tablet 3     zolpidem (AMBIEN) 10 MG tablet Take 1 tablet (10 mg) by mouth nightly as needed for sleep 90 tablet 1       Allergies   Allergen Reactions     Hydrocodone      Penicillins      Vicodin [Hydrocodone-Acetaminophen] Other (See Comments)     Burning in abd     Review of Systems:  -Constitutional: Patient is otherwise feeling well, in usual state of health.   -Skin: As above in HPI. No additional skin concerns.      Physical exam:  Vitals: There were no vitals taken for this visit.  GEN: This is a well developed, well-nourished female in no acute distress, in a pleasant mood.   SKIN: Focused examination of the face, neck, chest, back, upper buttocks, groin, upper extremities was completed.   - Kapadia Type I-II  - Pink follicular based macules that marimar from pressure on medial aspects of lower back and buttocks. Central portion of back is entirely clear. Similar blanching pink macules in the inguinal creases where underwear elastic comes into contact with the skin.  - U shaped band on central chest with pink slightly shiny macules that marimar to pressure. They most coalesce together at the central chest   - No other lesions of concern on areas examined.               Impression/Plan:    1. Rash/Eruption. Based on  description and pain, I suspect this is an irritant contact dermatitis or allergic contact dermatitis (possibly to elastic), but patient does not have the characteristic crusting I would expect for an eczematous dermatitis. This could be because it has been partially treated with TAC 0.1% cream. Because I am not sure of the cause, I recommended biopsy today. patient agreeable to this. Will base treatment plan on biopsy results.     Punch biopsy:  After discussion of benefits and risks including but not limited to bleeding/bruising, pain/swelling, infection, scar, incomplete removal, nerve damage/numbness, recurrence, and non-diagnostic biopsy, written consent, verbal consent and photographs were obtained. Time-out was performed. The area was cleaned with isopropyl alcohol. 0.5mL of 1% lidocaine with 1:100,000 epinephrine was injected to obtain adequate anesthesia of the lesion on the central chest.  A 4 mm punch biopsy was performed. 4-0 ethilon sutures were utilized to approximate the epidermal edges. White petroleum jelly/Vaseline and a bandage was applied to the wound. Explicit verbal and written wound care instructions were provided. The patient left the Dermatology Clinic in good condition. The patient was counseled to remove the suture in approximately 14 days. She was given suture removal kit.     Patient will follow-up for treatment pending biopsy results and further treatment.    Advised patient to avoid the pool for a full 48 hours or if wants to talk in water keep area covered with water proof bandage.       Follow-up pending biopsy results, earlier for new or changing lesions.     Staff Involved:  Scribe/Staff    Scribe Disclosure  IEstrella, am serving as a scribe to document services personally performed by Dr. Marli Szymanski MD, based on data collection and the provider's statements to me.     Provider Disclosure:   The documentation recorded by the scribe accurately reflects the services I  personally performed and the decisions made by me.    Marli Szymanski MD    Department of Dermatology  [facility]: Phone: [phone], Fax:[phone]  [facility]: Phone: [phone], Fax: [phone]              Again, thank you for allowing me to participate in the care of your patient.        Sincerely,        Marli Szymanski MD

## 2019-08-27 NOTE — PATIENT INSTRUCTIONS

## 2019-09-04 ENCOUNTER — TELEPHONE (OUTPATIENT)
Dept: DERMATOLOGY | Facility: CLINIC | Age: 64
End: 2019-09-04

## 2019-09-04 DIAGNOSIS — L73.9 FOLLICULITIS: Primary | ICD-10-CM

## 2019-09-04 LAB — COPATH REPORT: NORMAL

## 2019-09-04 RX ORDER — CLINDAMYCIN PHOSPHATE 10 UG/ML
LOTION TOPICAL
Qty: 60 ML | Refills: 0 | Status: SHIPPED | OUTPATIENT
Start: 2019-09-04 | End: 2019-10-14

## 2019-09-04 NOTE — TELEPHONE ENCOUNTER
Notes recorded by Adrienne Wilson RN on 9/4/2019 at 3:39 PM CDT  I spoke with Hailee and notified her of the results.  She verbalized understanding and agreed with the plan.  I reviewed the BP wash can bleach towels and clothing.  Follow up scheduled.  Adrienne Wilson RN    ------    Notes recorded by Marli Calderon MD on 9/4/2019 at 9:51 AM CDT  Please let patient know biopsy results were not absolutely definitive - they suggested folliculitis, mild eczema, or repetitive trauma to the skin. The results along with the appearance are more consistent with folliculitis - inflammation around the hair follicles that is likely bacterial in nature. We will have her treat with an over the counter antibacterial body wash (recommend benzoyl peroxide and have patient purchase) and a prescription antibacterial lotion.    RN: Please rx clindamycin 1% lotion to be applied in thin layer after showering daily.     Schedule follow up visit to recheck in 6-8 weeks.   Dermatological path order and indications   Order: [id number]   Status:  Final result   Visible to patient:  Yes (MyChart) Dx:  Rash/skin eruption   Component 8d ago   Copath Report Patient Name: HAILEE ZHAO   MR#: [id number]   Specimen #: [id number]   Collected: 8/27/2019   Received: 8/27/2019   Reported: 9/4/2019 09:40   Ordering Phy(s): MARLI CALDERON     For improved result formatting, select 'View Enhanced Report Format' under    Linked Documents section.     SPECIMEN(S):   Punch biopsy, central chest     FINAL DIAGNOSIS:   Punch biopsy, central chest:   - Mild perifolliculitis - (see comment)

## 2019-09-18 ENCOUNTER — OFFICE VISIT (OUTPATIENT)
Dept: FAMILY MEDICINE | Facility: CLINIC | Age: 64
End: 2019-09-18
Payer: COMMERCIAL

## 2019-09-18 VITALS
HEART RATE: 57 BPM | BODY MASS INDEX: 25.75 KG/M2 | TEMPERATURE: 97.6 F | SYSTOLIC BLOOD PRESSURE: 120 MMHG | OXYGEN SATURATION: 96 % | DIASTOLIC BLOOD PRESSURE: 81 MMHG | WEIGHT: 150 LBS | RESPIRATION RATE: 16 BRPM

## 2019-09-18 DIAGNOSIS — G47.00 INSOMNIA, UNSPECIFIED TYPE: ICD-10-CM

## 2019-09-18 DIAGNOSIS — R21 RASH: ICD-10-CM

## 2019-09-18 DIAGNOSIS — R63.4 WEIGHT LOSS: ICD-10-CM

## 2019-09-18 DIAGNOSIS — G89.4 CHRONIC PAIN SYNDROME: Primary | ICD-10-CM

## 2019-09-18 RX ORDER — ZOLPIDEM TARTRATE 10 MG/1
10 TABLET ORAL
Qty: 90 TABLET | Refills: 1 | Status: SHIPPED | OUTPATIENT
Start: 2019-09-18 | End: 2019-09-18

## 2019-09-18 RX ORDER — OXYCODONE HYDROCHLORIDE 5 MG/1
5 TABLET ORAL EVERY 4 HOURS PRN
Qty: 150 TABLET | Refills: 0 | Status: SHIPPED | OUTPATIENT
Start: 2019-09-18 | End: 2019-10-14

## 2019-09-18 RX ORDER — ZOLPIDEM TARTRATE 10 MG/1
10 TABLET ORAL
Qty: 90 TABLET | Refills: 1 | Status: SHIPPED | OUTPATIENT
Start: 2019-09-18 | End: 2020-04-13

## 2019-09-18 NOTE — PROGRESS NOTES
HPI  63 year old female here for evaluation of several issues.    Chronic pain  Stable  See below    Rash  Worsening  Has 4 bathing suits but likely made of same fabric  See derm again  Discussed options until then    Wt loss  Has 15K by mid morning  Has lost 3 more lbs this month            ROS  6 point ROS neg other than the symptoms noted above in the HPI.    MEDS  Current Outpatient Medications   Medication     clindamycin (CLEOCIN T) 1 % external lotion     gabapentin (NEURONTIN) 600 MG tablet     Multiple Vitamins-Minerals (MULTIVITAMIN ADULT PO)     oxyCODONE (ROXICODONE) 5 MG tablet     Probiotic Product (PROBIOTIC DAILY PO)     sertraline (ZOLOFT) 50 MG tablet     simvastatin (ZOCOR) 20 MG tablet     triamcinolone (KENALOG) 0.1 % external cream     valACYclovir (VALTREX) 500 MG tablet     zolpidem (AMBIEN) 10 MG tablet     naloxone (NARCAN) nasal spray     No current facility-administered medications for this visit.          SOC      FHx  Family History   Problem Relation Age of Onset     Cerebrovascular Disease Mother      Diabetes Father      Diabetes Maternal Grandfather      C.A.D. Maternal Grandfather      Myocardial Infarction Maternal Grandfather      Alcohol/Drug Maternal Grandfather      Cerebrovascular Disease Paternal Grandmother        PHYSICAL EXAMINATION:  Vital signs: /81   Pulse 57   Temp 97.6  F (36.4  C) (Oral)   Resp 16   Wt 68 kg (150 lb)   SpO2 96%   BMI 25.75 kg/m       Wt Readings from Last 5 Encounters:   09/18/19 68 kg (150 lb)   08/21/19 69.3 kg (152 lb 12.8 oz)   07/24/19 72.5 kg (159 lb 12.8 oz)   07/19/19 72.9 kg (160 lb 12.8 oz)   01/28/19 81.3 kg (179 lb 3.2 oz)         Gen: no distress  Skin: worsening rash on ant chest, upper breasts. Seems to be where suit seams would be.  Ext: no edema        LABS  Results for orders placed or performed in visit on 09/18/19   Rapid Urine Drug Screen ([facility])   Result Value Ref Range    Phencyclidine NEGATIVE NEGATIVE     Propoxyphene NEGATIVE NEGATIVE    Tricyclic Antidepressants NEGATIVE NEGATIVE    Amphetamines Qual NEGATIVE NEGATIVE    Barbiturates Qual Urine NEGATIVE NEGATIVE    Buprenorphine Qual Urine NEGATIVE NEGATIVE    Benzodiazepine Qual Urine NEGATIVE NEGATIVE    Cocaine Qual Urine NEGATIVE NEGATIVE    Cannabinoids Qual Urine NEGATIVE NEGATIVE    Methamphetamine Qual NEGATIVE NEGATIVE    Methadone Qual NEGATIVE NEGATIVE    Morphine Qual NEGATIVE NEGATIVE    Oxycodone Qual POSITIVE (A) NEGATIVE    Temperature of Urine was Between  Degrees F YES YES           ASSESSMENT/PLAN    1. Chronic pain syndrome  Utox - expected   - expected  Care Plan Date: August/2017  Current morphine equivalents/day = 37.5 mg   Naloxone - Yes    - Rapid Urine Drug Screen ([facility])  - oxyCODONE (ROXICODONE) 5 MG tablet; Take 1 tablet (5 mg) by mouth every 4 hours as needed for moderate to severe pain  Dispense: 150 tablet; Refill: 0    2. Insomnia, unspecified type  refill  - zolpidem (AMBIEN) 10 MG tablet; Take 1 tablet (10 mg) by mouth nightly as needed for sleep  Dispense: 90 tablet; Refill: 1    3. Rash  Saw derm  Worsening. Recommend visit with derm again  Wear t-shirt instead of swim suit. Possible contact derm issue    4. Weight loss  Continue exercise / diet        JANA DRIVER

## 2019-09-23 ENCOUNTER — OFFICE VISIT (OUTPATIENT)
Dept: DERMATOLOGY | Facility: CLINIC | Age: 64
End: 2019-09-23
Payer: MEDICARE

## 2019-09-23 DIAGNOSIS — L90.6 STRIAE ATROPHIC: Primary | ICD-10-CM

## 2019-09-23 PROCEDURE — 99213 OFFICE O/P EST LOW 20 MIN: CPT | Performed by: DERMATOLOGY

## 2019-09-23 ASSESSMENT — PAIN SCALES - GENERAL: PAINLEVEL: MODERATE PAIN (4)

## 2019-09-23 NOTE — PROGRESS NOTES
Visit Date:   09/23/2019      SUBJECTIVE:  Recheck visit for this pleasant lady who continues to get new lesions on her chest, back and near the buttocks.  Lesions on the chest are most dramatic.  They are present as a curvilinear configuration of a probably 2-3 cm wide band of redness and what appears to be shiny atrophic skin.   The lesions on her back are less well noted, but probably a similar process.  Biopsy was done by Dr. Szymanski revealed nonspecific inflammation.  No mention was made of any skin atrophy in the biopsy report.  Interesting history of her having started some weight loss in May, at and the rash started shortly thereafter.  She has a complicated history of a pain syndrome and is on multiple medications.  She is a very pleasant patient.      OBJECTIVE:     SKIN:  Shows  chest lesions  that are  bright red, said to be quite painful with stinging when she is in the salt water pool and sometimes it stings shortly thereafter.  The triamcinolone cream prescribed also produced a bit of stinging, but  she has used that minimally.  She has found the use of Aquaphor ointment of some help, but not helpful from reducing the lesion size or intensity of the redness.  The lesions on the chest to me appear to be brightly erythematous, but also suggest atrophy due to smoothness and a shiny surface.      ASSESSMENT:  This appears to be some type of situation where striae are occurring in an unusual pattern on the chest.  Perhaps it has something to do with her weight loss, which has been 30 pounds since May; perhaps it has to do some hormonal imbalance..  Any rate, I advised that I am not sure what was causing this.  I advised that she try silicone cream prior to her exercise program.  She does spent 2.5 hours in the pool with friends and thoroughly enjoys this and it appears to be quite good for her general wellbeing.      I advised that I would photograph the lesions and share these with some colleagues to  see if anyone has some ideas as to what might be going on or causing this.  The burning is the chief problem and it would be nice if that would lessen.  I suggest that she keep her appointment with Dr. Szymanski and in the meantime, I will see if I can research a bit of what might be happening in this situation.  I doubt that it has anything to do with her pain syndrome.  She has not been on any oral steroids for any length of time, which also might be connected with the development of atrophy or striae.       MEDICATIONS AND ALLERGIES:  Reviewed.      Return in November to see Dr. Szymanski.         H MILTON TOLLIVER MD             D: 2019   T: 2019   MT: SONAL      Name:     NATASHA ZHAO   MRN:      [id number]        Account:      [id number]   :      1955           Visit Date:   2019      Document: [id number]

## 2019-09-23 NOTE — LETTER
9/23/2019         RE: Hailee Mayo  [id number] [address] [place] [id number]        Dear Colleague,    Thank you for referring your patient, Hailee Mayo, to the [facility]. Please see a copy of my visit note below.    Visit Date:   09/23/2019      SUBJECTIVE:  Recheck visit for this pleasant lady who continues to get new lesions on her chest, back and near the buttocks.  Lesions on the chest are most dramatic.  They are present as a curvilinear configuration of a probably 2-3 cm wide band of redness and what appears to be shiny atrophic skin.   The lesions on her back are less well noted, but probably a similar process.  Biopsy was done by Dr. Szymanski revealed nonspecific inflammation.  No mention was made of any skin atrophy in the biopsy report.  Interesting history of her having started some weight loss in May, at and the rash started shortly thereafter.  She has a complicated history of a pain syndrome and is on multiple medications.  She is a very pleasant patient.      OBJECTIVE:     SKIN:  Shows  chest lesions  that are  bright red, said to be quite painful with stinging when she is in the salt water pool and sometimes it stings shortly thereafter.  The triamcinolone cream prescribed also produced a bit of stinging, but  she has used that minimally.  She has found the use of Aquaphor ointment of some help, but not helpful from reducing the lesion size or intensity of the redness.  The lesions on the chest to me appear to be brightly erythematous, but also suggest atrophy due to smoothness and a shiny surface.      ASSESSMENT:  This appears to be some type of situation where striae are occurring in an unusual pattern on the chest.  Perhaps it has something to do with her weight loss, which has been 30 pounds since May; perhaps it has to do some hormonal imbalance..  Any rate, I advised that I am not sure what was causing this.  I advised that she try silicone cream prior to  her exercise program.  She does spent 2.5 hours in the pool with friends and thoroughly enjoys this and it appears to be quite good for her general wellbeing.      I advised that I would photograph the lesions and share these with some colleagues to see if anyone has some ideas as to what might be going on or causing this.  The burning is the chief problem and it would be nice if that would lessen.  I suggest that she keep her appointment with Dr. Szymanski and in the meantime, I will see if I can research a bit of what might be happening in this situation.  I doubt that it has anything to do with her pain syndrome.  She has not been on any oral steroids for any length of time, which also might be connected with the development of atrophy or striae.       MEDICATIONS AND ALLERGIES:  Reviewed.      Return in November to see Dr. Szymanski.         SAM TOLLIVER MD             D: 2019   T: 2019   MT: SONAL      Name:     NATASHA ZHAO   MRN:      [id number]        Account:      [id number]   :      1955           Visit Date:   2019      Document: [id number]        Again, thank you for allowing me to participate in the care of your patient.        Sincerely,        SAM Tolliver MD

## 2019-09-23 NOTE — NURSING NOTE
Hailee Mayo's goals for this visit include:   Chief Complaint   Patient presents with     Rash     improvement       She requests these members of her care team be copied on today's visit information: YEs    PCP: Kaleb Mcelroy    Referring Provider:  No referring provider defined for this encounter.    There were no vitals taken for this visit.    Do you need any medication refills at today's visit? No    Rosi Timmons LPN

## 2019-09-23 NOTE — PATIENT INSTRUCTIONS
The lesions we saw today appear to be or represent thinning of the skin ( skin atrophy). They resemble stretch marks (striae). No obvious cause but the saltwater is likely the cause of the burning.    Suggest the use of Aquaphor and perhaps a silicone cream (Dorita) before doing your salt water exercises.

## 2019-10-04 DIAGNOSIS — B00.9 HERPES SIMPLEX VIRUS INFECTION: ICD-10-CM

## 2019-10-04 RX ORDER — VALACYCLOVIR HYDROCHLORIDE 500 MG/1
500 TABLET, FILM COATED ORAL DAILY
Qty: 180 TABLET | Refills: 3 | Status: SHIPPED | OUTPATIENT
Start: 2019-10-04 | End: 2020-09-14

## 2019-10-04 NOTE — TELEPHONE ENCOUNTER
"Request for medication refill: Valtrex 500mg tabs    Providers if patient needs an appointment and you are willing to give a one month supply please refill for one month and  send a letter/MyChart using \".SMILLIMITEDREFILL\" .smillimited and route chart to \"P SMI \" (Giving one month refill in non controlled medications is strongly recommended before denial)    If refill has been denied, meaning absolutely no refills without visit, please complete the smart phrase \".smirxrefuse\" and route it to the \"P SMI MED REFILLS\"  pool to inform the patient and the pharmacy.    Rosa Griffiths CMA        "

## 2019-10-14 ENCOUNTER — OFFICE VISIT (OUTPATIENT)
Dept: FAMILY MEDICINE | Facility: CLINIC | Age: 64
End: 2019-10-14
Payer: COMMERCIAL

## 2019-10-14 VITALS
DIASTOLIC BLOOD PRESSURE: 75 MMHG | SYSTOLIC BLOOD PRESSURE: 110 MMHG | OXYGEN SATURATION: 98 % | WEIGHT: 144 LBS | HEART RATE: 65 BPM | BODY MASS INDEX: 24.72 KG/M2 | TEMPERATURE: 97.6 F | RESPIRATION RATE: 16 BRPM

## 2019-10-14 DIAGNOSIS — G89.4 CHRONIC PAIN SYNDROME: Primary | ICD-10-CM

## 2019-10-14 DIAGNOSIS — F41.9 ANXIETY: ICD-10-CM

## 2019-10-14 DIAGNOSIS — E78.5 HYPERLIPIDEMIA WITH TARGET LDL LESS THAN 130: ICD-10-CM

## 2019-10-14 DIAGNOSIS — G90.522 COMPLEX REGIONAL PAIN SYNDROME TYPE 1 OF LEFT LOWER EXTREMITY: ICD-10-CM

## 2019-10-14 DIAGNOSIS — R21 RASH: ICD-10-CM

## 2019-10-14 LAB
AMPHETAMINES QUAL: NEGATIVE
BARBITURATES QUAL URINE: NEGATIVE
BENZODIAZEPINE QUAL URINE: NEGATIVE
BUPRENORPHINE QUAL URINE: NEGATIVE
CANNABINOIDS UR QL SCN: NEGATIVE
CHOLEST SERPL-MCNC: 159.7 MG/DL (ref 0–200)
CHOLEST/HDLC SERPL: 3.1 {RATIO} (ref 0–5)
COCAINE QUAL URINE: NEGATIVE
HDLC SERPL-MCNC: 51.1 MG/DL
LDLC SERPL CALC-MCNC: 92 MG/DL (ref 0–129)
METHAMPHETAMINE: NEGATIVE
METHODONE QUAL: NEGATIVE
MORPHINE QUAL: NEGATIVE
OXYCODONE QUAL: POSITIVE
PHENCYCLIDINE: NEGATIVE
PROPOXYPHENE: NEGATIVE
TEMPERATURE OF URINE WAS BETWEEN 90-100 DEGREES F: YES
TRICYCLIC ANTIDEPRESSANTS: NEGATIVE
TRIGL SERPL-MCNC: 83.5 MG/DL (ref 0–150)
VLDL CHOLESTEROL: 16.7 MG/DL (ref 7–32)

## 2019-10-14 RX ORDER — OXYCODONE HYDROCHLORIDE 5 MG/1
5 TABLET ORAL EVERY 4 HOURS PRN
Qty: 150 TABLET | Refills: 0 | Status: SHIPPED | OUTPATIENT
Start: 2019-10-14 | End: 2019-11-13

## 2019-10-14 NOTE — PROGRESS NOTES
HPI  63 year old female here for evaluation of several issues.    1. Chronic Pain  Reflex sympathetic dystrophy (complex regional pain syndrome) remains stable.  Using oxycodone 5 pills/day.  Doing multiple other things to control pain including exercise, behavioral health, sleep/diet.    2. Wt loss  Patient has lost 36 pounds through exercise and watching food.  Applauded this care.    3. Rash  Rash is now improving by using lemon grass tea.  She has seen dermatology twice but a Chinese woman at her swimming class recognize the rash and recommended to tea.    4. Lipids  With the weight loss would like to check lipids, stop simvastatin, and recheck lipids in a month.  The hope is that she no longer needs a statin medication.    5. Depression/anxiety  Depression anxiety have been well controlled on sertraline 50 mg daily.  She is doing very well.  We will trial off medications.      ROS  6 point ROS neg other than the symptoms noted above in the HPI.    MEDS  Current Outpatient Medications   Medication     Black Pepper-Turmeric (TURMERIC COMPLEX/BLACK PEPPER) 3-500 MG CAPS     gabapentin (NEURONTIN) 600 MG tablet     Multiple Vitamins-Minerals (MULTIVITAMIN ADULT PO)     naloxone (NARCAN) nasal spray     oxyCODONE (ROXICODONE) 5 MG tablet     Probiotic Product (PROBIOTIC DAILY PO)     sertraline (ZOLOFT) 50 MG tablet     simvastatin (ZOCOR) 20 MG tablet     valACYclovir (VALTREX) 500 MG tablet     zolpidem (AMBIEN) 10 MG tablet     clindamycin (CLEOCIN T) 1 % external lotion     triamcinolone (KENALOG) 0.1 % external cream     No current facility-administered medications for this visit.          SOC      FHx  Family History   Problem Relation Age of Onset     Cerebrovascular Disease Mother      Diabetes Father      Diabetes Maternal Grandfather      C.A.D. Maternal Grandfather      Myocardial Infarction Maternal Grandfather      Alcohol/Drug Maternal Grandfather      Cerebrovascular Disease Paternal Grandmother         PHYSICAL EXAMINATION:  Vital signs: /75   Pulse 65   Temp 97.6  F (36.4  C) (Oral)   Resp 16   Wt 65.3 kg (144 lb)   SpO2 98%   BMI 24.72 kg/m      Wt Readings from Last 5 Encounters:   10/14/19 65.3 kg (144 lb)   09/18/19 68 kg (150 lb)   08/21/19 69.3 kg (152 lb 12.8 oz)   07/24/19 72.5 kg (159 lb 12.8 oz)   07/19/19 72.9 kg (160 lb 12.8 oz)         Gen: no distress  Skin: improved rash on chest  Ext: no edema  MSE: clear        LABS  Results for orders placed or performed in visit on 09/18/19   Rapid Urine Drug Screen ([facility])   Result Value Ref Range    Phencyclidine NEGATIVE NEGATIVE    Propoxyphene NEGATIVE NEGATIVE    Tricyclic Antidepressants NEGATIVE NEGATIVE    Amphetamines Qual NEGATIVE NEGATIVE    Barbiturates Qual Urine NEGATIVE NEGATIVE    Buprenorphine Qual Urine NEGATIVE NEGATIVE    Benzodiazepine Qual Urine NEGATIVE NEGATIVE    Cocaine Qual Urine NEGATIVE NEGATIVE    Cannabinoids Qual Urine NEGATIVE NEGATIVE    Methamphetamine Qual NEGATIVE NEGATIVE    Methadone Qual NEGATIVE NEGATIVE    Morphine Qual NEGATIVE NEGATIVE    Oxycodone Qual POSITIVE (A) NEGATIVE    Temperature of Urine was Between  Degrees F YES YES           ASSESSMENT/PLAN    1. Chronic pain syndrome  Complex regional pain syndrome type 1 of left lower extremity  Utox - expected   - expected  FAQ - 60  Care Plan Date: August/2017  Current morphine equivalents/day = 37.5 mg   Naloxone - Yes    - Rapid Urine Drug Screen ([facility])  - oxyCODONE (ROXICODONE) 5 MG tablet; Take 1 tablet (5 mg) by mouth every 4 hours as needed for moderate to severe pain  Dispense: 150 tablet; Refill: 0    2. Hyperlipidemia with target LDL less than 130  Significant wt loss (intentional)  Stop simvastatin x 1 month  Recheck next visit    - Lipid [place] ([facility])    3. Rash  Improved with lemon grass tea    4. Anxiety  In a good place  Wean and stop sertaline  Monitor    5. RTC 1 month  Check lipids off simvastatin  Check  anxiety/depression off sertaline        JANA DRIVER

## 2019-10-14 NOTE — PATIENT INSTRUCTIONS
1. Chronic pain syndrome  - Rapid Urine Drug Screen ([facility])  - oxyCODONE (ROXICODONE) 5 MG tablet; Take 1 tablet (5 mg) by mouth every 4 hours as needed for moderate to severe pain  Dispense: 150 tablet; Refill: 0    2. Hyperlipidemia with target LDL less than 130  Stop simvastatin  We will check lipids next month  - Lipid [place] ([facility])    3. Depression  Stop sertaline  Use 1/2 pill for 3 days, then stop    PHarper

## 2019-10-22 DIAGNOSIS — L73.9 FOLLICULITIS: ICD-10-CM

## 2019-10-22 NOTE — TELEPHONE ENCOUNTER
Refill request for clindamycin 1% lotion received from pharmacy. RN unable to refill as medication was discontinued by PCP and is no longer on active med list.  Forwarding to Dr. Szymanski to review and advise.    Adrienne Wilson RN

## 2019-10-24 NOTE — TELEPHONE ENCOUNTER
Refill not indicated. I did not prescribe this and it was not talked about at visit.    Marli Szymanski MD    Department of Dermatology  [facility]: Phone: [phone], Fax:[phone]  [facility]: Phone: [phone], Fax: [phone]

## 2019-10-25 RX ORDER — CLINDAMYCIN PHOSPHATE 10 UG/ML
LOTION TOPICAL
Qty: 60 ML | Refills: 0 | OUTPATIENT
Start: 2019-10-25

## 2019-11-08 ENCOUNTER — TELEPHONE (OUTPATIENT)
Dept: DERMATOLOGY | Facility: CLINIC | Age: 64
End: 2019-11-08

## 2019-11-08 NOTE — TELEPHONE ENCOUNTER
[facility] Call Center    Phone Message    May a detailed message be left on voicemail: yes    Reason for Call: Other: Pt was unable to get her car started and missed her appt today. She is requesting a call back from care team to discuss if she even needs to come in given her symptoms have improved. She aplogizes!     Action Taken: Message routed to:  Adult Clinics: Dermatology p [phone]

## 2019-11-08 NOTE — TELEPHONE ENCOUNTER
Discussed pt circumstance with Dr. Mercer, per Dr. Mercer if everything is resolved then she does not need to come in for further consultation.    Left message for pt requesting call back to discuss Dr. Mercer' recommendations.  Raven Tompkins LPN

## 2019-11-08 NOTE — TELEPHONE ENCOUNTER
Patient returned office call; writer explained if everything is resolved, that no appointment is needed at this time. Patient expressed understanding and has no further questions.    Chana Murcia LPN

## 2019-11-13 ENCOUNTER — OFFICE VISIT (OUTPATIENT)
Dept: FAMILY MEDICINE | Facility: CLINIC | Age: 64
End: 2019-11-13
Payer: COMMERCIAL

## 2019-11-13 VITALS
BODY MASS INDEX: 23.56 KG/M2 | TEMPERATURE: 97.6 F | HEIGHT: 65 IN | OXYGEN SATURATION: 95 % | HEART RATE: 99 BPM | WEIGHT: 141.4 LBS | SYSTOLIC BLOOD PRESSURE: 110 MMHG | DIASTOLIC BLOOD PRESSURE: 77 MMHG

## 2019-11-13 DIAGNOSIS — R42 DIZZINESS: ICD-10-CM

## 2019-11-13 DIAGNOSIS — E78.5 HYPERLIPIDEMIA WITH TARGET LDL LESS THAN 130: ICD-10-CM

## 2019-11-13 DIAGNOSIS — E78.5 HYPERLIPIDEMIA LDL GOAL <130: ICD-10-CM

## 2019-11-13 DIAGNOSIS — F41.9 ANXIETY: ICD-10-CM

## 2019-11-13 DIAGNOSIS — G89.4 CHRONIC PAIN SYNDROME: Primary | ICD-10-CM

## 2019-11-13 LAB
AMPHETAMINES QUAL: NEGATIVE
BARBITURATES QUAL URINE: NEGATIVE
BENZODIAZEPINE QUAL URINE: NEGATIVE
BUPRENORPHINE QUAL URINE: NEGATIVE
CANNABINOIDS UR QL SCN: NEGATIVE
CHOLEST SERPL-MCNC: 256.8 MG/DL (ref 0–200)
CHOLEST/HDLC SERPL: 4.9 {RATIO} (ref 0–5)
COCAINE QUAL URINE: NEGATIVE
HDLC SERPL-MCNC: 52.6 MG/DL
LDLC SERPL CALC-MCNC: 183 MG/DL (ref 0–129)
METHAMPHETAMINE: NEGATIVE
METHODONE QUAL: NEGATIVE
MORPHINE QUAL: NEGATIVE
OXYCODONE QUAL: POSITIVE
PHENCYCLIDINE: NEGATIVE
PROPOXYPHENE: NEGATIVE
TEMPERATURE OF URINE WAS BETWEEN 90-100 DEGREES F: YES
TRICYCLIC ANTIDEPRESSANTS: NEGATIVE
TRIGL SERPL-MCNC: 104.5 MG/DL (ref 0–150)
VLDL CHOLESTEROL: 20.9 MG/DL (ref 7–32)

## 2019-11-13 RX ORDER — OXYCODONE HYDROCHLORIDE 5 MG/1
5 TABLET ORAL EVERY 4 HOURS PRN
Qty: 150 TABLET | Refills: 0 | Status: SHIPPED | OUTPATIENT
Start: 2019-11-13 | End: 2019-12-09

## 2019-11-13 ASSESSMENT — MIFFLIN-ST. JEOR: SCORE: 1189.33

## 2019-11-13 NOTE — PROGRESS NOTES
"       HPI       Hailee Mayo is a 63 year old  who presents for   Chief Complaint   Patient presents with     Refill Request     oxycodone     -Making cookies on Saturday, not doing anything extraneous. Had eaten. Was standing up, didn't change positions. Was making 1 batch. \"Saw white, got cold and clammy, but then knew I better sit.\" Sat for about 1 minute, felt her ears plug up and couldn't clear them, and then felt dizzy. Only lasted a few minutes. Wasn't too warm. No palpitations. Then today after the pool and getting into the showers, felt the same thing.     -Pain  Stress makes it worse, has a little extra stress with her granddaughter living with an aunt but wants to live with Hailee now. Thinking about what to do. Did the same with with the granddaughter's older sister. Asked if her friend at the pool who recommended lemon grass tea for the rash would also know what to recommended for pain.      -Stopped simvastatin  Getting over 26K steps in today. Doing well with the pool, feels great.    -Stopped sertraline  Stressed with the family situations, but not where she was at before. \"I know what that feels like and I'm not there.\" But says she will keep an eye on it and check in with us.       +++++++    Problem, Medication and Allergy Lists were reviewed and updated if needed..    Patient is an established patient of this clinic..         Review of Systems:   Review of Systems  10 point ROS negative besides as described above       Physical Exam:     Vitals:    11/13/19 1053   BP: 110/77   Pulse: 99   Temp: 97.6  F (36.4  C)   TempSrc: Oral   SpO2: 95%   Weight: 64.1 kg (141 lb 6.4 oz)   Height: 1.638 m (5' 4.5\")     Body mass index is 23.9 kg/m .  Vitals were reviewed and were normal     Physical Exam  Constitutional:       General: She is not in acute distress.     Appearance: Normal appearance. She is not ill-appearing.   Eyes:      Extraocular Movements: Extraocular movements intact.   Cardiovascular: "      Rate and Rhythm: Normal rate and regular rhythm.      Heart sounds: No murmur. No gallop.    Pulmonary:      Effort: Pulmonary effort is normal. No respiratory distress.   Skin:     General: Skin is warm and dry.   Neurological:      General: No focal deficit present.      Mental Status: She is alert and oriented to person, place, and time.      Gait: Gait normal.   Psychiatric:         Mood and Affect: Mood normal.         Behavior: Behavior normal.           Results:      Results from this visit  Results for orders placed or performed in visit on 11/13/19   Rapid Urine Drug Screen ([facility])     Status: Abnormal   Result Value Ref Range    Cannabinoids Qual Urine Negative NEGATIVE    Phencyclidine Negative NEGATIVE    Cocaine Qual Urine Negative NEGATIVE    Methamphetamine Qual Negative NEGATIVE    Morphine Qual Negative NEGATIVE    Amphetamines Qual Negative NEGATIVE    Benzodiazepine Qual Urine Negative NEGATIVE    Tricyclic Antidepressants Negative NEGATIVE    Methadone Qual Negative NEGATIVE    Barbiturates Qual Urine Negative NEGATIVE    Oxycodone Qual Positive (A) NEGATIVE    Propoxyphene Negative NEGATIVE    Buprenorphine Qual Urine Negative NEGATIVE    Temperature of Urine was Between  Degrees F YES YES   Lipid [place] ([facility])     Status: Abnormal   Result Value Ref Range    Cholesterol 256.8 (H) 0.0 - 200.0 mg/dL    Cholesterol/HDL Ratio 4.9 0.0 - 5.0    HDL Cholesterol 52.6 >40.0 mg/dL    Triglycerides 104.5 0.0 - 150.0 mg/dL    VLDL Cholesterol 20.9 7.0 - 32.0 mg/dL    LDL Cholesterol Calculated 183 (H) 0 - 129 mg/dL       Assessment and Plan      Hailee Mayo is a 63 year old female with a pmh significant for complex regional pain syndrome type 1 of LLE, anxiety, and hyperlipidemia presenting today for Utox and refill request with concerns about recent vertigo episodes.     Diagnoses and all orders for this visit:    Vertigo  Unclear etiology, possibly vasovagal, but the first episode  there was no clear trigger. The second one was less significant and no real dizziness but did have the visual changes and was in a steamy shower. Unlikely to be BPPV as it wasn't positional, but does fit short period of time. No concerning symptoms such as LOS, palpitations, AMS, etc. With only the 1 unusual episode, will wait and watch for now and follow-up at next visit.      Chronic pain syndrome  Urine screen as expected. Pain a little worse with recent increased family stress, will check in on that.   -     Rapid Urine Drug Screen ([facility])  -     oxyCODONE (ROXICODONE) 5 MG tablet; Take 1 tablet (5 mg) by mouth every 4 hours as needed for moderate to severe pain    Hyperlipidemia with target LDL less than 130  Cholesterol at 256.8 and LDL at 183 off statin. LDL 92 on statin.  Will have her restart statin    -     Lipid [place] ([facility])    Anxiety  Doing well off the sertraline, no change to self-talk, no change in appetite or sleep, no tearfulness. She feels she is appropriate reacting to family stress but will keep an eye on that and check in. No acute concerns.     Health maintenance  Will get the zoster vaccine today at the pharmacy. Flu was already done recently.        Medications Discontinued During This Encounter   Medication Reason     oxyCODONE (ROXICODONE) 5 MG tablet Reorder     simvastatin (ZOCOR) 20 MG tablet Therapy completed       Options for treatment and follow-up care were reviewed with the patient. Hailee Mayo  engaged in the decision making process and verbalized understanding of the options discussed and agreed with the final plan.    Ayo Swanson, MS3  Kaleb Mcelroy MD    Physician Attestation   I, Kaleb Mcelroy, was present with the medical student who participated in the service and in the documentation of the note.  I have verified the history and personally performed the physical exam and medical decision making.  I agree with the assessment and plan of care as documented in the  note.    }.    Kaleb Mcelroy MD

## 2019-11-15 RX ORDER — SIMVASTATIN 20 MG
20 TABLET ORAL AT BEDTIME
Qty: 90 TABLET | Refills: 3 | COMMUNITY
Start: 2019-11-15 | End: 2019-11-24

## 2019-11-15 RX ORDER — INFLUENZA A VIRUS A/GUANGDONG-MAONAN/SWL1536/2019 CNIC-1909 (H1N1) ANTIGEN (FORMALDEHYDE INACTIVATED), INFLUENZA A VIRUS A/HONG KONG/2671/2019 (H3N2) ANTIGEN (FORMALDEHYDE INACTIVATED), INFLUENZA B VIRUS B/PHUKET/3073/2013 ANTIGEN (FORMALDEHYDE INACTIVATED), AND INFLUENZA B VIRUS B/WASHINGTON/02/2019 ANTIGEN (FORMALDEHYDE INACTIVATED) 15; 15; 15; 15 UG/.5ML; UG/.5ML; UG/.5ML; UG/.5ML
INJECTION, SUSPENSION INTRAMUSCULAR
Refills: 0 | COMMUNITY
Start: 2019-10-14 | End: 2021-01-05

## 2019-11-22 DIAGNOSIS — E78.5 HYPERLIPIDEMIA LDL GOAL <130: ICD-10-CM

## 2019-11-24 RX ORDER — SIMVASTATIN 20 MG
20 TABLET ORAL AT BEDTIME
Qty: 90 TABLET | Refills: 3 | Status: SHIPPED | OUTPATIENT
Start: 2019-11-24 | End: 2020-09-14

## 2019-12-09 ENCOUNTER — OFFICE VISIT (OUTPATIENT)
Dept: FAMILY MEDICINE | Facility: CLINIC | Age: 64
End: 2019-12-09
Payer: COMMERCIAL

## 2019-12-09 VITALS
TEMPERATURE: 97.6 F | DIASTOLIC BLOOD PRESSURE: 81 MMHG | RESPIRATION RATE: 16 BRPM | SYSTOLIC BLOOD PRESSURE: 118 MMHG | HEART RATE: 76 BPM | BODY MASS INDEX: 24.84 KG/M2 | WEIGHT: 147 LBS | OXYGEN SATURATION: 97 %

## 2019-12-09 DIAGNOSIS — E78.5 HYPERLIPIDEMIA WITH TARGET LDL LESS THAN 130: Primary | ICD-10-CM

## 2019-12-09 DIAGNOSIS — G89.4 CHRONIC PAIN SYNDROME: ICD-10-CM

## 2019-12-09 RX ORDER — ZOSTER VACCINE RECOMBINANT, ADJUVANTED 50 MCG/0.5
KIT INTRAMUSCULAR
Refills: 0 | COMMUNITY
Start: 2019-11-13 | End: 2023-02-23

## 2019-12-09 RX ORDER — OXYCODONE HYDROCHLORIDE 5 MG/1
5 TABLET ORAL EVERY 4 HOURS PRN
Qty: 150 TABLET | Refills: 0 | Status: SHIPPED | OUTPATIENT
Start: 2019-12-09 | End: 2020-01-20

## 2019-12-09 NOTE — PATIENT INSTRUCTIONS
1. CRPS  Continue oxycodone  Be aware of drama//stress that increases pain    2. Lipids  Restart simvastatin    PHarper

## 2019-12-09 NOTE — PROGRESS NOTES
HPI  63 year old female here for evaluation of several issues.    1. RSD  Using oxycodone 5 pills/day  FAQ - stable    2. Wt  Wt up  Still lots of exercise    3. Stress  Yissel -   Funmil[place]o - best friends with boy who was killed by father      4. Lipids  Will restart statin                 ROS  6 point ROS neg other than the symptoms noted above in the HPI.    MEDS  Current Outpatient Medications   Medication     Black Pepper-Turmeric (TURMERIC COMPLEX/BLACK PEPPER) 3-500 MG CAPS     gabapentin (NEURONTIN) 600 MG tablet     Multiple Vitamins-Minerals (MULTIVITAMIN ADULT PO)     naloxone (NARCAN) nasal spray     oxyCODONE (ROXICODONE) 5 MG tablet     Probiotic Product (PROBIOTIC DAILY PO)     valACYclovir (VALTREX) 500 MG tablet     zolpidem (AMBIEN) 10 MG tablet     FLUZONE QUADRIVALENT 0.5 ML injection     simvastatin (ZOCOR) 20 MG tablet     No current facility-administered medications for this visit.          SOC      FHx  Family History   Problem Relation Age of Onset     Cerebrovascular Disease Mother      Diabetes Father      Diabetes Maternal Grandfather      C.A.D. Maternal Grandfather      Myocardial Infarction Maternal Grandfather      Alcohol/Drug Maternal Grandfather      Cerebrovascular Disease Paternal Grandmother        PHYSICAL EXAMINATION:  Vital signs: /81   Pulse 76   Temp 97.6  F (36.4  C) (Oral)   Resp 16   SpO2 97%      Wt Readings from Last 5 Encounters:   12/09/19 66.7 kg (147 lb)   11/13/19 64.1 kg (141 lb 6.4 oz)   10/14/19 65.3 kg (144 lb)   09/18/19 68 kg (150 lb)   08/21/19 69.3 kg (152 lb 12.8 oz)           Gen: no distress  Ext: no edema, warmth OK, able to palpate without dysesthia        LABS    Results for orders placed or performed in visit on 12/09/19   Rapid Urine Drug Screen ([facility])     Status: Abnormal   Result Value Ref Range    Cannabinoids Qual Urine NEGATIVE NEGATIVE    Phencyclidine NEGATIVE NEGATIVE    Cocaine Qual Urine NEGATIVE NEGATIVE    Methamphetamine Qual  NEGATIVE NEGATIVE    Morphine Qual NEGATIVE NEGATIVE    Amphetamines Qual NEGATIVE NEGATIVE    Benzodiazepine Qual Urine NEGATIVE NEGATIVE    Tricyclic Antidepressants NEGATIVE NEGATIVE    Methadone Qual NEGATIVE NEGATIVE    Barbiturates Qual Urine NEGATIVE NEGATIVE    Oxycodone Qual POSITIVE (A) NEGATIVE    Propoxyphene NEGATIVE NEGATIVE    Buprenorphine Qual Urine NEGATIVE NEGATIVE    Temperature of Urine was Between  Degrees F YES YES             ASSESSMENT/PLAN    1. Chronic pain syndrome  Utox - expected   - expected  FAQ - 60  Care Plan Date: August/2017  Current morphine equivalents/day = 37.5 mg   Naloxone - Yes    Stressors increasing pain  Consider reducing one pill/day next visit     - oxyCODONE (ROXICODONE) 5 MG tablet; Take 1 tablet (5 mg) by mouth every 4 hours as needed for moderate to severe pain  Dispense: 150 tablet; Refill: 0    2. Hyperlipidemia with target LDL less than 130  Restart Simvastatin          JANA DRIVER

## 2019-12-18 NOTE — MR AVS SNAPSHOT
After Visit Summary   8/14/2017    Hailee Mayo    MRN: [id number]           Patient Information     Date Of Birth          1955        Visit Information        Provider Department      8/14/2017 9:40 AM [facility]        Today's Diagnoses     Chronic pain syndrome    -  1       Follow-ups after your visit        Your next 10 appointments already scheduled     Oct 04, 2017 10:00 AM CDT   US THYROID with MGUS1, MG US TECH   [facility])    [id number] [place] [address] [phone]           Please bring a list of your medicines (including vitamins, minerals and over-the-counter drugs). Also, tell your doctor about any allergies you may have. Wear comfortable clothes and leave your valuables at home.  You do not need to do anything special to prepare for your exam.  Please call the Imaging Department at your exam site with any questions.            Jan 09, 2018 12:15 PM CST   Return Visit with Enid Benavides MD   [facility])    [address] [address] [place] [address] [phone]              Who to contact     Please call your clinic at [phone] to:    Ask questions about your health    Make or cancel appointments    Discuss your medicines    Learn about your test results    Speak to your doctor   If you have compliments or concerns about an experience at your clinic, or if you wish to file a complaint, please contact [facility] Patient Relations at [phone] or email us at [email or web address]         Additional Information About Your Visit        MyChart Information     "[employer]hart gives you secure access to your electronic health record. If you see a primary care provider, you can also send messages to your care team and make appointments. If you have questions, please call your primary care  clinic.  If you do not have a primary care provider, please call [phone] and they will assist you.      [employer] is an electronic gateway that provides easy, online access to your medical records. With [employer], you can request a clinic appointment, read your test results, renew a prescription or communicate with your care team.     To access your existing account, please contact your [facility] or call [phone] for assistance.        Care EveryWhere ID     This is your Care EveryWhere ID. This could be used by other organizations to access your [facility] medical records  [id number]         Blood Pressure from Last 3 Encounters:   08/14/17 130/88   05/22/17 111/75   05/08/17 111/70    Weight from Last 3 Encounters:   08/14/17 182 lb (82.6 kg)   05/22/17 179 lb (81.2 kg)   05/08/17 180 lb 6 oz (81.8 kg)              Today, you had the following     No orders found for display         Today's Medication Changes          These changes are accurate as of: 8/14/17  4:47 PM.  If you have any questions, ask your nurse or doctor.               Start taking these medicines.        Dose/Directions    naloxone nasal spray   Commonly known as:  NARCAN   Used for:  Chronic pain syndrome   Started by:  Kaleb Mcelroy MD        Dose:  4 mg   Spray 1 spray (4 mg) into one nostril alternating nostrils as needed for opioid reversal (every 2-3 minutes until assistance arrives.)   Quantity:  0.2 mL   Refills:  1       oxyCODONE 5 MG IR tablet   Commonly known as:  ROXICODONE   Used for:  Chronic pain syndrome   Started by:  Kaleb Mcelroy MD        Dose:  5 mg   Start taking on:  10/14/2017   Take 1 tablet (5 mg) by mouth every 4 hours as needed for moderate to severe pain   Quantity:  150 tablet   Refills:  0         Stop taking these medicines if you haven't already. Please contact your care team if you have questions.     BIOTIN PO   Stopped by:  Kaleb Mcelroy MD                Where to  get your medicines      These medications were sent to [facility] [place] [id number] [facility]  [address], [facility] [address]     Phone:  [phone]     sertraline 50 MG tablet         These medications were sent to [facility] [id number] [place] [address] [address] [facility]  [address], [facility] [address]     Phone:  [phone]     naloxone nasal spray         Some of these will need a paper prescription and others can be bought over the counter.  Ask your nurse if you have questions.     Bring a paper prescription for each of these medications     oxyCODONE 5 MG IR tablet                Primary Care Provider Office Phone # Fax #    Kaleb Mcelroy [phone]       [address] [address]        Equal Access to Services     [facility] : Hadii aad ku hadasho Soomaali, waaxda luqadaha, qaybta kaalmada adeegyada, waxay hemanthin haybyronn kailee lim . So [facility] [phone].    ATENCIÓN: Si habla español, tiene a roca disposición servicios gratuitos de asistencia lingüística. Tito al [phone].    We comply with applicable federal civil rights laws and [place] laws. We do not discriminate on the basis of race, color, national origin, age, disability sex, sexual orientation or gender identity.            Thank you!     Thank you for choosing [facility]  for your care. Our goal is always to provide you with excellent care. Hearing back from our patients is one way we can continue to improve our services. Please take a few minutes to complete the written survey that you may receive in the mail after your visit with us. Thank you!             Your Updated Medication List - Protect others around you: Learn how to safely use, store and throw away your medicines at www.disposemymeds.org.          This list is accurate as of: 8/14/17  4:47 PM.  Always use your  most recent med list.                   Brand Name Dispense Instructions for use Diagnosis    gabapentin 600 MG tablet    NEURONTIN    540 tablet    Take 2 tablets (1,200 mg) by mouth 3 times daily    Reflex sympathetic dystrophy       MULTIVITAMIN ADULT PO      Take by mouth daily        naloxone nasal spray    NARCAN    0.2 mL    Spray 1 spray (4 mg) into one nostril alternating nostrils as needed for opioid reversal (every 2-3 minutes until assistance arrives.)    Chronic pain syndrome       order for DME     1 each    Equipment being ordered: Scooter    Reflex sympathetic dystrophy of the lower limb       oxyCODONE 5 MG IR tablet   Start taking on:  10/14/2017    ROXICODONE    150 tablet    Take 1 tablet (5 mg) by mouth every 4 hours as needed for moderate to severe pain    Chronic pain syndrome       PROBIOTIC DAILY PO      Take by mouth daily        sertraline 50 MG tablet    ZOLOFT    90 tablet    Take 1 tablet (50 mg) by mouth daily    Anxiety       simvastatin 20 MG tablet    ZOCOR    90 tablet    Take 1 tablet (20 mg) by mouth At Bedtime    Hyperlipidemia LDL goal <130       valACYclovir 500 MG tablet    VALTREX    180 tablet    Take 1 tablet (500 mg) by mouth daily    Herpes simplex virus infection       zolpidem 10 MG tablet    AMBIEN    90 tablet    Take 1 tablet (10 mg) by mouth nightly as needed    Insomnia, unspecified type          Never

## 2020-01-20 ENCOUNTER — OFFICE VISIT (OUTPATIENT)
Dept: FAMILY MEDICINE | Facility: CLINIC | Age: 65
End: 2020-01-20
Payer: COMMERCIAL

## 2020-01-20 VITALS
WEIGHT: 141 LBS | OXYGEN SATURATION: 99 % | HEART RATE: 57 BPM | DIASTOLIC BLOOD PRESSURE: 80 MMHG | HEIGHT: 64 IN | SYSTOLIC BLOOD PRESSURE: 119 MMHG | RESPIRATION RATE: 18 BRPM | BODY MASS INDEX: 24.07 KG/M2 | TEMPERATURE: 97.8 F

## 2020-01-20 DIAGNOSIS — E78.5 HYPERLIPIDEMIA LDL GOAL <130: ICD-10-CM

## 2020-01-20 DIAGNOSIS — G89.4 CHRONIC PAIN SYNDROME: Primary | ICD-10-CM

## 2020-01-20 LAB
AMPHETAMINES QUAL: NEGATIVE
BARBITURATES QUAL URINE: NEGATIVE
BENZODIAZEPINE QUAL URINE: NEGATIVE
BUPRENORPHINE QUAL URINE: NEGATIVE
CANNABINOIDS UR QL SCN: NEGATIVE
CHOLEST SERPL-MCNC: 174.9 MG/DL (ref 0–200)
CHOLEST/HDLC SERPL: 3.1 {RATIO} (ref 0–5)
COCAINE QUAL URINE: NEGATIVE
HDLC SERPL-MCNC: 55.9 MG/DL
LDLC SERPL CALC-MCNC: 102 MG/DL (ref 0–129)
METHAMPHETAMINE: NEGATIVE
METHODONE QUAL: NEGATIVE
MORPHINE QUAL: NEGATIVE
OXYCODONE QUAL: POSITIVE
PHENCYCLIDINE: NEGATIVE
PROPOXYPHENE: NEGATIVE
TEMPERATURE OF URINE WAS BETWEEN 90-100 DEGREES F: YES
TRICYCLIC ANTIDEPRESSANTS: NEGATIVE
TRIGL SERPL-MCNC: 85.5 MG/DL (ref 0–150)
VLDL CHOLESTEROL: 17.1 MG/DL (ref 7–32)

## 2020-01-20 RX ORDER — OXYCODONE HYDROCHLORIDE 5 MG/1
5 TABLET ORAL EVERY 4 HOURS PRN
Qty: 150 TABLET | Refills: 0 | Status: SHIPPED | OUTPATIENT
Start: 2020-01-20 | End: 2020-02-17

## 2020-01-20 ASSESSMENT — MIFFLIN-ST. JEOR: SCORE: 1174.57

## 2020-01-20 NOTE — PATIENT INSTRUCTIONS
Personal Care Plan for Chronic Pain     1.  Personal Goals:  Engaging and spending time with my grandkids as much as I can, getting to my water aerobics and spending that time with the others int he class and my mom, spending time with my mom and my sister, feel like I am still of use in this world, that I am important to my grandma, a good partner to my , to stay in my house and keep it clean        2.  Sleep:                          *  Basic sleep plan:                                              *reduce or eliminate caffeine and daytime naps                                              * relaxation before bed                                              * limit screen time 1-2 hours prior to bed                                              * establish dark/quiet sleep environment                        *  Nighttime medications including the following: zolpidem     3.  Physical Activity:                          * Home/community based activity:                                              * Yoga dvd 1x/week                                              * water aerobics 2x/week:                          * Listen to your body.  Pace yourself for success.  Don't over-do it.  Don't under do it.                        * Balance activities with rest and set realistic goals.      4.  Nutrition/Weight:                          * Try to eat at least 5 servings of fresh fruits and vegetables each day.                        * Limit processed foods and foods high in sugar, sodium and fat.                        * Maintain a healthy weight.      5.  Mood/Stress Management:                         * Listening to healing cd for relaxation and pain management - can use for acute pain, but it's also helpful to do this daily to help retrain brain                        * Listening Doss music                        * Taking sertraline medication daily     6.  Pain:                          * Non-medication  treatments:                                              * light massage as tolerated     7.  Pain Medications: oxycodone as prescribed     No follow up with behavioral health planned at this time          JEAN CLAUDE Villarreal, GORAN Rodriguez, MANOJ Parrish, DENYS Acosta., JIMY New., & ADRIANA Mcgovern. Cognitive behavioral therapy for chronic pain among veterans: Therapist manual. [place], [place]: .[place].          JEAN CLAUDE Villarreal, GORAN Rodriguez, MANOJ Parrish, DENYS Acosta., JIMY New., & ADRIANA Mcgovern. Cognitive behavioral therapy for chronic pain among veterans: Therapist manual. [place]: [employer].

## 2020-01-20 NOTE — PROGRESS NOTES
HPI  64 year old female here for evaluation of several issues.    1. Chronic Pain  FAQ5=60  Reviewed care plan - copy given    2. Lipids  Restarted simvastatin  Compliance good  No SE  Recheck lipids    3.          ROS  6 point ROS neg other than the symptoms noted above in the HPI.    MEDS  Current Outpatient Medications   Medication     Black Pepper-Turmeric (TURMERIC COMPLEX/BLACK PEPPER) 3-500 MG CAPS     FLUZONE QUADRIVALENT 0.5 ML injection     gabapentin (NEURONTIN) 600 MG tablet     Multiple Vitamins-Minerals (MULTIVITAMIN ADULT PO)     naloxone (NARCAN) nasal spray     oxyCODONE (ROXICODONE) 5 MG tablet     Probiotic Product (PROBIOTIC DAILY PO)     SHINGRIX injection     simvastatin (ZOCOR) 20 MG tablet     valACYclovir (VALTREX) 500 MG tablet     zolpidem (AMBIEN) 10 MG tablet     No current facility-administered medications for this visit.          SOC      FHx  Family History   Problem Relation Age of Onset     Cerebrovascular Disease Mother      Diabetes Father      Diabetes Maternal Grandfather      C.A.D. Maternal Grandfather      Myocardial Infarction Maternal Grandfather      Alcohol/Drug Maternal Grandfather      Cerebrovascular Disease Paternal Grandmother        PHYSICAL EXAMINATION:  Vital signs: There were no vitals taken for this visit.    Gen: no distress  Ext: no edema, toes cool, pulses good. No dysesthesia        LABS  Results for orders placed or performed in visit on 01/20/20   Rapid Urine Drug Screen ([facility])     Status: Abnormal   Result Value Ref Range    Cannabinoids Qual Urine NEGATIVE NEGATIVE    Phencyclidine NEGATIVE NEGATIVE    Cocaine Qual Urine NEGATIVE NEGATIVE    Methamphetamine Qual NEGATIVE NEGATIVE    Morphine Qual NEGATIVE NEGATIVE    Amphetamines Qual NEGATIVE NEGATIVE    Benzodiazepine Qual Urine NEGATIVE NEGATIVE    Tricyclic Antidepressants NEGATIVE NEGATIVE    Methadone Qual NEGATIVE NEGATIVE    Barbiturates Qual Urine NEGATIVE NEGATIVE    Oxycodone Qual POSITIVE  (A) NEGATIVE    Propoxyphene NEGATIVE NEGATIVE    Buprenorphine Qual Urine NEGATIVE NEGATIVE    Temperature of Urine was Between  Degrees F YES YES   Lipid Cascade ([facility])     Status: None   Result Value Ref Range    Cholesterol 174.9 0.0 - 200.0 mg/dL    Cholesterol/HDL Ratio 3.1 0.0 - 5.0    HDL Cholesterol 55.9 >40.0 mg/dL    Triglycerides 85.5 0.0 - 150.0 mg/dL    VLDL Cholesterol 17.1 7.0 - 32.0 mg/dL    LDL Cholesterol Calculated 102 0 - 129 mg/dL           ASSESSMENT/PLAN    1. Chronic pain syndrome  Utox - expected   - expected  FAQ - 60  Care Plan Date: August/2017  Current morphine equivalents/day = 37.5 mg   Naloxone - NA      - Rapid Urine Drug Screen ([facility])  - oxyCODONE (ROXICODONE) 5 MG tablet; Take 1 tablet (5 mg) by mouth every 4 hours as needed for moderate to severe pain  Dispense: 150 tablet; Refill: 0    2. Hyperlipidemia LDL goal <130  Recheck Lipids on simvastatin - much improved    - Lipid Cascade ([facility])    3. RTC 1month      JANA DRIVER

## 2020-02-17 ENCOUNTER — OFFICE VISIT (OUTPATIENT)
Dept: FAMILY MEDICINE | Facility: CLINIC | Age: 65
End: 2020-02-17
Payer: COMMERCIAL

## 2020-02-17 VITALS
TEMPERATURE: 97.5 F | HEART RATE: 64 BPM | DIASTOLIC BLOOD PRESSURE: 82 MMHG | OXYGEN SATURATION: 98 % | HEIGHT: 64 IN | RESPIRATION RATE: 16 BRPM | WEIGHT: 145.2 LBS | SYSTOLIC BLOOD PRESSURE: 122 MMHG | BODY MASS INDEX: 24.79 KG/M2

## 2020-02-17 DIAGNOSIS — G89.4 CHRONIC PAIN SYNDROME: Primary | ICD-10-CM

## 2020-02-17 RX ORDER — OXYCODONE HYDROCHLORIDE 5 MG/1
5 TABLET ORAL EVERY 4 HOURS PRN
Qty: 150 TABLET | Refills: 0 | Status: SHIPPED | OUTPATIENT
Start: 2020-02-17 | End: 2020-03-11

## 2020-02-17 ASSESSMENT — MIFFLIN-ST. JEOR: SCORE: 1193.62

## 2020-02-17 NOTE — PROGRESS NOTES
"HPI  64 year old female here for evaluation of several issues.    1. chronic pain  Stable except for 3 days of acute pain       2. Acute pain  on 1/29 she felt hot \"little needle\" type pain on her core, inside of arms and her legs. It lasted 3 days. Now gone. Uncertain reason. Reduced exercise from 5-6day/wk to 4 days/wk    3. Wt  Up with reduced exercise  23,500 steps today so far.    Wt Readings from Last 5 Encounters:   02/17/20 65.9 kg (145 lb 3.2 oz)   01/20/20 64 kg (141 lb)   12/09/19 66.7 kg (147 lb)   11/13/19 64.1 kg (141 lb 6.4 oz)   10/14/19 65.3 kg (144 lb)               ROS  6 point ROS neg other than the symptoms noted above in the HPI.    MEDS  Current Outpatient Medications   Medication     Black Pepper-Turmeric (TURMERIC COMPLEX/BLACK PEPPER) 3-500 MG CAPS     FLUZONE QUADRIVALENT 0.5 ML injection     gabapentin (NEURONTIN) 600 MG tablet     Multiple Vitamins-Minerals (MULTIVITAMIN ADULT PO)     naloxone (NARCAN) nasal spray     oxyCODONE (ROXICODONE) 5 MG tablet     Probiotic Product (PROBIOTIC DAILY PO)     SHINGRIX injection     simvastatin (ZOCOR) 20 MG tablet     valACYclovir (VALTREX) 500 MG tablet     zolpidem (AMBIEN) 10 MG tablet     No current facility-administered medications for this visit.          SOC      FHx  Family History   Problem Relation Age of Onset     Cerebrovascular Disease Mother      Diabetes Father      Diabetes Maternal Grandfather      C.A.D. Maternal Grandfather      Myocardial Infarction Maternal Grandfather      Alcohol/Drug Maternal Grandfather      Cerebrovascular Disease Paternal Grandmother        PHYSICAL EXAMINATION:  Vital signs: /82   Pulse 64   Temp 97.5  F (36.4  C) (Oral)   Resp 16   Ht 1.626 m (5' 4\")   Wt 65.9 kg (145 lb 3.2 oz)   SpO2 98%   BMI 24.92 kg/m      Gen: no distress  Ext: no edema, L foot slightly cooler to touch, monofilament irritating to toes 2,3,4 on L    FAQ5 - 55 (down)  FUNCTIONAL ASSESSMENT QUESTIONNAIRE SCORE 4/24/2019 " 7/24/2019   Total Score 65 60     FUNCTIONAL ASSESSMENT QUESTIONNAIRE SCORE 8/21/2019 12/9/2019   Total Score 60 60     FUNCTIONAL ASSESSMENT QUESTIONNAIRE SCORE 1/20/2020 2/17/2020   Total Score 60 55         LABS  Results for orders placed or performed in visit on 02/17/20   Rapid Urine Drug Screen ([facility])     Status: Abnormal   Result Value Ref Range    Cannabinoids Qual Urine NEGATIVE NEGATIVE    Phencyclidine NEGATIVE NEGATIVE    Cocaine Qual Urine NEGATIVE NEGATIVE    Methamphetamine Qual NEGATIVE NEGATIVE    Morphine Qual NEGATIVE NEGATIVE    Amphetamines Qual NEGATIVE NEGATIVE    Benzodiazepine Qual Urine NEGATIVE NEGATIVE    Tricyclic Antidepressants NEGATIVE NEGATIVE    Methadone Qual NEGATIVE NEGATIVE    Barbiturates Qual Urine NEGATIVE NEGATIVE    Oxycodone Qual POSITIVE (A) NEGATIVE    Propoxyphene NEGATIVE NEGATIVE    Buprenorphine Qual Urine NEGATIVE NEGATIVE    Temperature of Urine was Between  Degrees F YES YES         ASSESSMENT/PLAN    1. Chronic pain syndrome  Utox - expected   - expected  FAQ - 55  Care Plan Date: August/2017  Current morphine equivalents/day = 37.5 mg   Naloxone - NA         - Rapid Urine Drug Screen ([facility])  - oxyCODONE (ROXICODONE) 5 MG tablet; Take 1 tablet (5 mg) by mouth every 4 hours as needed for moderate to severe pain  Dispense: 150 tablet; Refill: 0      JANA DRIVER

## 2020-02-17 NOTE — LETTER
Hailee Mayo  [id number]           February 19, 2020        Informed  Consent  and  Agreement  for  Opioid  Therapy  of  Pain        Reason  for  Review  and  Signing  of  this  Document       Pain  relief  is  an  important  goal  for  your  care.  Opioid  medications  may  be  a  helpful  part  of   chronic  pain  treatment  for  some  people;  however,  misuse  of  opioid  medications  may result  in   serious  harm  to  patients  prescribed  them  and,  when  the  medications  are  diverted,  to  the  public  at   large.  As  opioid  use  for  pain  management  has  increased  in  recent  years,  injury,  addiction,  and   death  due  to  misuse  of  opioids  have  also  increased.    Patients  and  health  care  providers  both  have  responsibilities  for  the  safe  use  of  opioid   medications  when  they  are  prescribed  for  pain.  This  agreement  provides  important information   on  the  potential  benefits  and  risks  of  opioid  medications  and shows that both  you and  your  provider  agree  on  a  care  plan  so  that  opioid  medications  are  used  in  a  way  that  is  safe   and  effective  in  treating  your  pain.    This  agreement  is  reviewed  and  signed  by  all  patients  in  our   practice  who  receive  opioids  for  chronic  pain.          Expected  Benefits  or  Goals  of  Opioid  Treatment     Improved  pain      Improved  ability  to  engage  in  work,  social,  recreational  and/or  physical  activities      Improved  quality  of  life            Potential Risks or Side Effects of Opioid Treatment    Overdose Taking more than the prescribed amount of medication or using with alcohol or other drugs can cause you to stop breathing, resulting in coma, brain damage, or even death. Every single day, more than 40 Americans die from prescription opioid overdoses. The Center for Disease Control and Prevention (CDC) has declared that the [place] is in the middle of an epidemic opioid  overdose deaths.      Physical side effects May include mood changes, drowsiness, nausea, constipation, urination difficulties, depressed breathing, itching, bone thinning and sexual difficulties, such as lowering male hormone in men and stopping menstrual periods in women.      Physical dependence Suddenly stopping an opioid may lead to withdrawal symptoms including abdominal cramping, pain, diarrhea, sweating, anxiety, irritability and aching.     Tolerance A dose of an opioid may become less effective over time even though there is no change in your physical condition. If this happens repeatedly, your medication may need to be changed or discontinued.      Addiction Is more common in people with personal or family history of addiction, but can occur in anyone. It is suggested by drug craving, loss of control and may lead to money, relationship, or legal problems.     Hyperalgesia The use of opioids can increase the experience of pain. If this happens, it may require change or discontinuation of medication.      Sleep apnea  (periods of not breathing while asleep) may be caused or worsened by opioids.   Risk to unborn child Risks to unborn children may include: physical dependence at birth, possible alterations in pain perception, possible increased risk for addiction later in life, among others. Tell your provider if you are or intend to become pregnant.       Victimization There is a risk that you or someone in your household may be the victim of theft, deceit, assault or abuse by people trying to take your medications to misuse them.          Responsibilities  in  Opioid  Therapy  of  Chronic  Pain      Your provider's responsibilities: listening carefully to your concerns, treating you with  care and with due respect, and making clinical decisions based on what he/she believes is in  your best interest.    Your responsibilities: In order to maximize the potential benefits of opioid medications and to  minimize  the potential risks, it is important that you accept the following responsibilities. In  signing this agreement, you agree to:    1. Use your opioid medications as prescribed for the purpose of relieving pain  2. Keep your medications locked up to avoid intentional or unintentional use or diversion  by others. Discard all unused medications.  3. Be honest with your providers about your medication or other drug use.  4. Use no illegal drugs and not abuse alcohol while being prescribed opioids.  5. Not to share, sell, trade or in any way provide your medications to others.  6. Receive opioid medications from this practice only. If opioids are prescribed  unexpectedly by another office (for example due to an accident or dental procedure),  inform this office within 24 hours.  7. Fill your opioid medications at one pharmacy only. Inform this practice within 24 hours  if you must use a pharmacy different from your usual one.  8. Have urine drugs tests on a random basis and as requested by your provider. (Opioid  may be discontinued if the tests are declined illicit drugs found or medication not present when should be.)  9. Bring your opioid medications to the practice when requested.  10. Participate in other pain treatments agreed to with your provider and keep all  appointments scheduled for your care.  11. Permit this practice to communicate with other care providers and/or your significant  others as needed to assure opioids are being used appropriately and are beneficial to  your health and well-being  12.  I understand that my clinic can track controlled substance prescriptions from other providers through a central database (prescription monitoring program).  13.   I will tell my clinic right away if I become pregnant or have a new medical problem treated at another clinic    Your medications may be continued if they improve your pain, help you engage in valued  activities, and/or enhance your quality of life and  if you follow the above responsibilities.  Your medications may be discontinued if your goals for treatment are not met, if you experience negative  effects from using them, or if you do not follow this agreement.  If you develop complications of opioid use, such as addiction, we will assist you in finding  treatment. Please be aware, however, that our practice cooperates fully with law enforcement,  the [place] Drug Enforcement Agency and other agencies in the investigation of opioid-related  crimes including sharing, selling, trading or other potential harmful use of these powerful  Medications.    I have reviewed this document and been given the opportunity to have any questions  answered. I understand the possible benefits and risks of opioid medications and I accept the  responsibilities described above.    __________________________________         ____________________________________  Patient     Date          Kaleb Mcelroy MD                     Date

## 2020-02-19 ENCOUNTER — DOCUMENTATION ONLY (OUTPATIENT)
Dept: FAMILY MEDICINE | Facility: CLINIC | Age: 65
End: 2020-02-19

## 2020-02-19 NOTE — PROGRESS NOTES
Mailed to patients home [address] [address] [place] [id number].    2/19/2020  Maura Germain CMA  [facility] Coordinator

## 2020-03-01 ENCOUNTER — HEALTH MAINTENANCE LETTER (OUTPATIENT)
Age: 65
End: 2020-03-01

## 2020-03-09 ENCOUNTER — TELEPHONE (OUTPATIENT)
Dept: FAMILY MEDICINE | Facility: CLINIC | Age: 65
End: 2020-03-09

## 2020-03-09 DIAGNOSIS — G89.4 CHRONIC PAIN SYNDROME: ICD-10-CM

## 2020-03-09 NOTE — TELEPHONE ENCOUNTER
"[facility] phone call message- medication clarification/question:    Full Medication Name: oxyCODONE (ROXICODONE) 5 MG tablet   Dose: Take 1 tablet (5 mg) by mouth every 4 hours as needed for moderate to severe pain     Question/Clarification needed: Patient states \"I have a weakened immune system and would like to know with this Corona virus going around, do I still need to come in for my visit on 3/16/20 @ 10:40a.m , or can  just send Rx to Pharmacy\"?     Pharmacy confirmed as   [id number] - [place], [place] - [facility] AT [facility] & [address] [address]  Phone: [phone] Fax: [phone]    [facility] - [place], [place] - [address] [place] [address]  [address] [facility]  [address]  [facility] [place] [address]  Phone: [phone] Fax: [phone]    [facility] [address] [place] [facility] - [address] [address]  Phone: [phone] Fax: [phone]  : Yes    Please leave ONLY preferred pharmacy    OK to leave a message on voice mail? Yes    Advised patient that RN would call back within 3 hours, unless emergent.    Primary language: English      needed? No    Call taken on March 9, 2020 at 10:34 AM by Maida Christy    Route to [facility] TRIAGE      "

## 2020-03-09 NOTE — TELEPHONE ENCOUNTER
Received refill request for oxyCODONE (ROXICODONE) 5 MG tablet  , per clinic policy, refill request requires office visit, patient voiced concerns following epidemic outbreak of flu/cronavirus, message routed to PCP to address/advise if appropriate.    Rubi Gonzalez RN

## 2020-03-11 RX ORDER — OXYCODONE HYDROCHLORIDE 5 MG/1
5 TABLET ORAL EVERY 4 HOURS PRN
Qty: 150 TABLET | Refills: 0 | Status: SHIPPED | OUTPATIENT
Start: 2020-03-11 | End: 2020-04-13

## 2020-03-11 NOTE — TELEPHONE ENCOUNTER
Rx-script for oxyCODONE (ROXICODONE) 5 MG tablet -was approved and sent to pharmacy patient notified.    Rubi Gonzalez RN

## 2020-03-11 NOTE — TELEPHONE ENCOUNTER
Patient calling inquiring about a virtual visit for her Oxycodone refill noted in below message.  Laquita Arora, CMA

## 2020-03-11 NOTE — TELEPHONE ENCOUNTER
Will send in Rx to [facility] for this month  Staff to call her to inform.  Fouzia

## 2020-03-19 PROBLEM — F11.90 CHRONIC, CONTINUOUS USE OF OPIOIDS: Status: ACTIVE | Noted: 2020-03-19

## 2020-04-13 ENCOUNTER — VIRTUAL VISIT (OUTPATIENT)
Dept: FAMILY MEDICINE | Facility: CLINIC | Age: 65
End: 2020-04-13
Payer: COMMERCIAL

## 2020-04-13 VITALS — WEIGHT: 149 LBS | HEART RATE: 54 BPM | BODY MASS INDEX: 25.58 KG/M2

## 2020-04-13 DIAGNOSIS — Z71.89 ADVICE GIVEN ABOUT COVID-19 VIRUS BY TELEPHONE: ICD-10-CM

## 2020-04-13 DIAGNOSIS — G90.522 COMPLEX REGIONAL PAIN SYNDROME TYPE 1 OF LEFT LOWER EXTREMITY: ICD-10-CM

## 2020-04-13 DIAGNOSIS — G47.00 INSOMNIA, UNSPECIFIED TYPE: ICD-10-CM

## 2020-04-13 DIAGNOSIS — G89.4 CHRONIC PAIN SYNDROME: Primary | ICD-10-CM

## 2020-04-13 RX ORDER — ZOLPIDEM TARTRATE 10 MG/1
10 TABLET ORAL
Qty: 90 TABLET | Refills: 1 | Status: SHIPPED | OUTPATIENT
Start: 2020-04-13 | End: 2020-12-04

## 2020-04-13 RX ORDER — OXYCODONE HYDROCHLORIDE 5 MG/1
5 TABLET ORAL EVERY 4 HOURS PRN
Qty: 150 TABLET | Refills: 0 | Status: SHIPPED | OUTPATIENT
Start: 2020-05-13 | End: 2020-04-13

## 2020-04-13 RX ORDER — OXYCODONE HYDROCHLORIDE 5 MG/1
5 TABLET ORAL EVERY 4 HOURS PRN
Qty: 150 TABLET | Refills: 0 | Status: SHIPPED | OUTPATIENT
Start: 2020-04-13 | End: 2020-04-13

## 2020-04-13 RX ORDER — OXYCODONE HYDROCHLORIDE 5 MG/1
5 TABLET ORAL EVERY 4 HOURS PRN
Qty: 150 TABLET | Refills: 0 | Status: SHIPPED | OUTPATIENT
Start: 2020-06-13 | End: 2020-06-22

## 2020-04-13 ASSESSMENT — ANXIETY QUESTIONNAIRES
3. WORRYING TOO MUCH ABOUT DIFFERENT THINGS: NOT AT ALL
5. BEING SO RESTLESS THAT IT IS HARD TO SIT STILL: NOT AT ALL
1. FEELING NERVOUS, ANXIOUS, OR ON EDGE: SEVERAL DAYS
7. FEELING AFRAID AS IF SOMETHING AWFUL MIGHT HAPPEN: NOT AT ALL
IF YOU CHECKED OFF ANY PROBLEMS ON THIS QUESTIONNAIRE, HOW DIFFICULT HAVE THESE PROBLEMS MADE IT FOR YOU TO DO YOUR WORK, TAKE CARE OF THINGS AT HOME, OR GET ALONG WITH OTHER PEOPLE: NOT DIFFICULT AT ALL
6. BECOMING EASILY ANNOYED OR IRRITABLE: NOT AT ALL
2. NOT BEING ABLE TO STOP OR CONTROL WORRYING: NOT AT ALL
GAD7 TOTAL SCORE: 1

## 2020-04-13 ASSESSMENT — PATIENT HEALTH QUESTIONNAIRE - PHQ9
SUM OF ALL RESPONSES TO PHQ QUESTIONS 1-9: 3
5. POOR APPETITE OR OVEREATING: NOT AT ALL

## 2020-04-13 NOTE — PROGRESS NOTES
"Family Medicine Telephone Visit Note               Telephone Visit Consent   Patient was verbally read the following and verbal consent was obtained.    \"This telephone visit will be conducted via a call between you and your physician/provider. We have found that certain health care needs can be provided without the need for a physical exam.  This service lets us provide the care you need with a short phone conversation.  If a prescription is necessary we can send it directly to your pharmacy.  If lab work is needed we can place an order for that and you can then stop by our lab to have the test done at a later time.    Telephone visits are billed at different rates depending on your insurance coverage. During this emergency period, for some insurers they may be billed the same as an in-person visit.  Please reach out to your insurance provider with any questions.    If during the course of the call the physician/provider feels a telephone visit is not appropriate, you will not be charged for this service.\"    Name person giving consent:  Patient   Date verbal consent given:  4/13/2020  Time verbal consent given:  10:44 AM           Chief Complaint   Patient presents with     RECHECK                  HPI   Patients name: Hailee  Appointment start time:  11:08 AM    1. Chronic Pain  Chronic Regional Pain Syndrome  Stable  execise patterns changing with COVID. Legs are stronger, doing well with walking.  [facility]codone- [facility] [employer] and Ambien- [facility]    Consent - reviewed, patient gave verbal consent  Care Plan - reviewed, essentially staying the same   - expected  Utox - defer due to covid      2. Sleep  Needs ambien    3. COVID Stay-at-Home  3 classes  Book  [employer]  [facility] [employer]  Walks with   Connections with pool group    Discussed [email or web address]        Current Outpatient Medications   Medication Sig Dispense Refill     Black Pepper-Turmeric (TURMERIC COMPLEX/BLACK PEPPER) 3-500 MG CAPS  " "      FLUZONE QUADRIVALENT 0.5 ML injection TO BE ADMINISTERED BY PHARMACIST FOR IMMUNIZATION  0     gabapentin (NEURONTIN) 600 MG tablet Take 2 tablets (1,200 mg) by mouth 3 times daily 540 tablet 3     Multiple Vitamins-Minerals (MULTIVITAMIN ADULT PO) Take by mouth daily       naloxone (NARCAN) nasal spray Spray 1 spray (4 mg) into one nostril alternating nostrils as needed for opioid reversal (every 2-3 minutes until assistance arrives.) 0.2 mL 1     oxyCODONE (ROXICODONE) 5 MG tablet Take 1 tablet (5 mg) by mouth every 4 hours as needed for moderate to severe pain 150 tablet 0     Probiotic Product (PROBIOTIC DAILY PO) Take by mouth daily       SHINGRIX injection TO BE ADMINISTERED BY PHARMACIST FOR IMMUNIZATION  0     simvastatin (ZOCOR) 20 MG tablet Take 1 tablet (20 mg) by mouth At Bedtime 90 tablet 3     valACYclovir (VALTREX) 500 MG tablet Take 1 tablet (500 mg) by mouth daily 180 tablet 3     zolpidem (AMBIEN) 10 MG tablet Take 1 tablet (10 mg) by mouth nightly as needed for sleep 90 tablet 1     Allergies   Allergen Reactions     Penicillins Anaphylaxis     Swelling. Tolerating ceftriaxone       Hydrocodone      Vicodin [Hydrocodone-Acetaminophen] Other (See Comments)     Burning in abd              Review of Systems:     ROS COMP: Constitutional, HEENT, cardiovascular, pulmonary, gi and gu systems are negative, except as otherwise noted.             Physical Exam:     There were no vitals taken for this visit.  Estimated body mass index is 24.92 kg/m  as calculated from the following:    Height as of 2/17/20: 1.626 m (5' 4\").    Weight as of 2/17/20: 65.9 kg (145 lb 3.2 oz).    Exam:  Constitutional: healthy, alert and no distress  Psychiatric: mentation appears normal and affect normal/bright    Diagnostic Test Results: deferred due to COVID workflows        Assessment and Plan     1. Chronic pain syndrome  2. Complex regional pain syndrome type 1 of left lower extremity  Utox - not done   - " expected  FAQ - 60  Care Plan Date: April/2020 reviewed. Some changes with COVID pandemic  Current morphine equivalents/day = 37.5 mg   Naloxone - Yes    Consent - done today - mailed copy in March, in present AVS  Care Plan - reviewed today - teimporary changes needed due to COVID pandemic    Refills x 3 months  RTC in July 2020    - oxyCODONE (ROXICODONE) 5 MG tablet; Take 1 tablet (5 mg) by mouth every 4 hours as needed for moderate to severe pain  Dispense: 150 tablet; Refill: 0        3. Insomnia, unspecified type  - zolpidem (AMBIEN) 10 MG tablet; Take 1 tablet (10 mg) by mouth nightly as needed for sleep  Dispense: 90 tablet; Refill: 1      4. Advice Given About Covid-19 Virus by Telephone  Long discussion  [email or web address]  Doing well with maintaining connections, exercise, etc          Refilled medications that would be required in the next 3 months.   Rx's sent to different pharmacies - oxy to local, ambien to mail order              After Visit Information:  Patient chose to view AVS via Richard Pauer - 3P        Appointment start time:  11:08 AM  Appointment end time: 11:29 AM  This is a telephone visit that took  21 minutes.      Clinician location:  Park Mcelroy MD

## 2020-04-14 ASSESSMENT — ANXIETY QUESTIONNAIRES: GAD7 TOTAL SCORE: 1

## 2020-04-14 NOTE — PATIENT INSTRUCTIONS
Personal Care Plan for Chronic Pain  Reviewed 4/13/2020     1.  Personal Goals:  Engaging and spending time with my grandkids as much as I can, getting to my water aerobics and spending that time with the others int he class and my mom, spending time with my mom and my sister, feel like I am still of use in this world, that I am important to my grandma, a good partner to my , to stay in my house and keep it clean        2.  Sleep:                          *  Basic sleep plan:                                              *reduce or eliminate caffeine and daytime naps                                              * relaxation before bed                                              * limit screen time 1-2 hours prior to bed                                              * establish dark/quiet sleep environment                        *  Nighttime medications including the following: zolpidem     3.  Physical Activity:                          * Home/community based activity:                                              * Yoga dvd 1x/week                                              * water aerobics 3-4x/week:                          * Listen to your body.  Pace yourself for success.  Don't over-do it.  Don't under do it.                        * Balance activities with rest and set realistic goals.      4.  Nutrition/Weight:                          * Try to eat at least 5 servings of fresh fruits and vegetables each day.                        * Limit processed foods and foods high in sugar, sodium and fat.                        * Maintain a healthy weight.      5.  Mood/Stress Management:                         * Listening to healing cd for relaxation and pain management - can use for acute pain, but it's also helpful to do this daily to help retrain brain                        * Listening Evans music                        * Taking sertraline medication daily     6.  Pain:                          *  Non-medication treatments:                                              * light massage as tolerated     7.  Pain Medications: oxycodone as prescribed                 JEAN CLAUDE Villarreal McKellar, J.D., MANOJ Parrish, DENYS Acosta., JIMY New., & ADRIANA Mcgovern. Cognitive behavioral therapy for chronic pain among veterans: Therapist manual. [place], [place]: [facility].        JEAN CLAUDE Villarreal McKellar, J.D., MANOJ Parrish, CHRISTIANO Acosta, YOVANI New, & ADRIANA Mcgovern. Cognitive behavioral therapy for chronic pain among veterans: Therapist manual. [place], [place]: [facility].        Hailee Mayo  [id number]         April 14, 2020  Informed  Consent  and  Agreement  for  Opioid  Therapy  of  Pain        Reason  for  Review  and  Signing  of  this  Document       Pain  relief  is  an  important  goal  for  your  care.  Opioid  medications  may  be  a  helpful  part  of   chronic  pain  treatment  for  some  people;  however,  misuse  of  opioid  medications  may result  in   serious  harm  to  patients  prescribed  them  and,  when  the  medications  are  diverted,  to  the  public  at   large.  As  opioid  use  for  pain  management  has  increased  in  recent  years,  injury,  addiction,  and   death  due  to  misuse  of  opioids  have  also  increased.    Patients  and  health  care  providers  both  have  responsibilities  for  the  safe  use  of  opioid   medications  when  they  are  prescribed  for  pain.  This  agreement  provides  important information   on  the  potential  benefits  and  risks  of  opioid  medications  and shows that both  you and  your  provider  agree  on  a  care  plan  so  that  opioid  medications  are  used  in  a  way  that  is  safe   and  effective  in  treating  your  pain.    This  agreement  is  reviewed  and  signed  by  all  patients  in  our   practice  who  receive  opioids  for  chronic  pain.          Expected  Benefits  or  Goals  of  Opioid   Treatment     Improved  pain      Improved  ability  to  engage  in  work,  social,  recreational  and/or  physical  activities      Improved  quality  of  life            Potential Risks or Side Effects of Opioid Treatment    Overdose Taking more than the prescribed amount of medication or using with alcohol or other drugs can cause you to stop breathing, resulting in coma, brain damage, or even death. Every single day, more than 40 Americans die from prescription opioid overdoses. The Center for Disease Control and Prevention (CDC) has declared that the [place] is in the middle of an epidemic opioid overdose deaths.      Physical side effects May include mood changes, drowsiness, nausea, constipation, urination difficulties, depressed breathing, itching, bone thinning and sexual difficulties, such as lowering male hormone in men and stopping menstrual periods in women.      Physical dependence Suddenly stopping an opioid may lead to withdrawal symptoms including abdominal cramping, pain, diarrhea, sweating, anxiety, irritability and aching.     Tolerance A dose of an opioid may become less effective over time even though there is no change in your physical condition. If this happens repeatedly, your medication may need to be changed or discontinued.      Addiction Is more common in people with personal or family history of addiction, but can occur in anyone. It is suggested by drug craving, loss of control and may lead to money, relationship, or legal problems.     Hyperalgesia The use of opioids can increase the experience of pain. If this happens, it may require change or discontinuation of medication.      Sleep apnea  (periods of not breathing while asleep) may be caused or worsened by opioids.   Risk to unborn child Risks to unborn children may include: physical dependence at birth, possible alterations in pain perception, possible increased risk for addiction later in life, among others. Tell your provider if  you are or intend to become pregnant.       Victimization There is a risk that you or someone in your household may be the victim of theft, deceit, assault or abuse by people trying to take your medications to misuse them.          Responsibilities  in  Opioid  Therapy  of  Chronic  Pain      Your provider's responsibilities: listening carefully to your concerns, treating you with  care and with due respect, and making clinical decisions based on what he/she believes is in  your best interest.    Your responsibilities: In order to maximize the potential benefits of opioid medications and to  minimize the potential risks, it is important that you accept the following responsibilities. In  signing this agreement, you agree to:    1. Use your opioid medications as prescribed for the purpose of relieving pain  2. Keep your medications locked up to avoid intentional or unintentional use or diversion  by others. Discard all unused medications.  3. Be honest with your providers about your medication or other drug use.  4. Use no illegal drugs and not abuse alcohol while being prescribed opioids.  5. Not to share, sell, trade or in any way provide your medications to others.  6. Receive opioid medications from this practice only. If opioids are prescribed  unexpectedly by another office (for example due to an accident or dental procedure),  inform this office within 24 hours.  7. Fill your opioid medications at one pharmacy only. Inform this practice within 24 hours  if you must use a pharmacy different from your usual one.  8. Have urine drugs tests on a random basis and as requested by your provider. (Opioid  may be discontinued if the tests are declined illicit drugs found or medication not present when should be.)  9. Bring your opioid medications to the practice when requested.  10. Participate in other pain treatments agreed to with your provider and keep all  appointments scheduled for your care.  11. Permit this  practice to communicate with other care providers and/or your significant  others as needed to assure opioids are being used appropriately and are beneficial to  your health and well-being  12.  I understand that my clinic can track controlled substance prescriptions from other providers through a central database (prescription monitoring program).  13.   I will tell my clinic right away if I become pregnant or have a new medical problem treated at another clinic    Your medications may be continued if they improve your pain, help you engage in valued  activities, and/or enhance your quality of life and if you follow the above responsibilities.  Your medications may be discontinued if your goals for treatment are not met, if you experience negative  effects from using them, or if you do not follow this agreement.  If you develop complications of opioid use, such as addiction, we will assist you in finding  treatment. Please be aware, however, that our practice cooperates fully with law enforcement,  the US Drug Enforcement Agency and other agencies in the investigation of opioid-related  crimes including sharing, selling, trading or other potential harmful use of these powerful  Medications.    I have reviewed this document and been given the opportunity to have any questions  answered. I understand the possible benefits and risks of opioid medications and I accept the  responsibilities described above.    __________________________________                 ____________________________________  Patient     Date         Kaleb Mcelroy MD                     Date 4/13/2020

## 2020-06-22 ENCOUNTER — VIRTUAL VISIT (OUTPATIENT)
Dept: FAMILY MEDICINE | Facility: CLINIC | Age: 65
End: 2020-06-22
Payer: COMMERCIAL

## 2020-06-22 DIAGNOSIS — G90.522 COMPLEX REGIONAL PAIN SYNDROME TYPE 1 OF LEFT LOWER EXTREMITY: ICD-10-CM

## 2020-06-22 DIAGNOSIS — G90.50 REFLEX SYMPATHETIC DYSTROPHY: ICD-10-CM

## 2020-06-22 DIAGNOSIS — G89.4 CHRONIC PAIN SYNDROME: Primary | ICD-10-CM

## 2020-06-22 RX ORDER — GABAPENTIN 600 MG/1
1200 TABLET ORAL 3 TIMES DAILY
Qty: 540 TABLET | Refills: 3 | Status: SHIPPED | OUTPATIENT
Start: 2020-06-22 | End: 2021-01-05

## 2020-06-22 RX ORDER — OXYCODONE HYDROCHLORIDE 5 MG/1
5 TABLET ORAL EVERY 4 HOURS PRN
Qty: 150 TABLET | Refills: 0 | Status: SHIPPED | OUTPATIENT
Start: 2020-09-13 | End: 2020-09-14

## 2020-06-22 RX ORDER — OXYCODONE HYDROCHLORIDE 5 MG/1
5 TABLET ORAL EVERY 4 HOURS PRN
Qty: 150 TABLET | Refills: 0 | Status: SHIPPED | OUTPATIENT
Start: 2020-07-13 | End: 2020-06-22

## 2020-06-22 RX ORDER — OXYCODONE HYDROCHLORIDE 5 MG/1
5 TABLET ORAL EVERY 4 HOURS PRN
Qty: 150 TABLET | Refills: 0 | Status: SHIPPED | OUTPATIENT
Start: 2020-08-13 | End: 2020-06-22

## 2020-06-22 NOTE — PROGRESS NOTES
"Family Medicine Telephone Visit Note               Telephone Visit Consent   Patient was verbally read the following and verbal consent was obtained.    \"Telephone visits are billed at different rates depending on your insurance coverage. During this emergency period, for some insurers they may be billed the same as an in-person visit.  Please reach out to your insurance provider with any questions.  If during the course of the call the physician/provider feels a telephone visit is not appropriate, you will not be charged for this service.\"    Name person giving consent:  Patient   Date verbal consent given:  6/22/2020  Time verbal consent given:  10:47 AM        Chief Complaint   Patient presents with     Recheck Medication     Refill meds                     HPI   Patients name: Hailee  Appointment start time:  11:05 AM      1. Chronic Pain  Stable, certainly not worse, and much stronger  meds  1 pre-ex  1 post -ex  1 lunch  1 evening  1 bed    Exercise  going to pool now  Was walking 2.5 miles    Wt -  Up10#    Needs refill of gabapentin also - send to [facility]      Utox - not done due to phone visit   - expected  FAQ - 60  Care Plan Date: April/2020 reviewed.  Consent - April 2020  Current morphine equivalents/day = 37.5 mg   Naloxone - Yes      2. COVID   with possible COVID  Discussed spread / mask              Current Outpatient Medications   Medication Sig Dispense Refill     Black Pepper-Turmeric (TURMERIC COMPLEX/BLACK PEPPER) 3-500 MG CAPS        FLUZONE QUADRIVALENT 0.5 ML injection TO BE ADMINISTERED BY PHARMACIST FOR IMMUNIZATION  0     gabapentin (NEURONTIN) 600 MG tablet Take 2 tablets (1,200 mg) by mouth 3 times daily 540 tablet 3     Multiple Vitamins-Minerals (MULTIVITAMIN ADULT PO) Take by mouth daily       naloxone (NARCAN) nasal spray Spray 1 spray (4 mg) into one nostril alternating nostrils as needed for opioid reversal (every 2-3 minutes until assistance arrives.) 0.2 mL 1     " "oxyCODONE (ROXICODONE) 5 MG tablet Take 1 tablet (5 mg) by mouth every 4 hours as needed for moderate to severe pain 150 tablet 0     Probiotic Product (PROBIOTIC DAILY PO) Take by mouth daily       SHINGRIX injection TO BE ADMINISTERED BY PHARMACIST FOR IMMUNIZATION  0     simvastatin (ZOCOR) 20 MG tablet Take 1 tablet (20 mg) by mouth At Bedtime 90 tablet 3     valACYclovir (VALTREX) 500 MG tablet Take 1 tablet (500 mg) by mouth daily 180 tablet 3     zolpidem (AMBIEN) 10 MG tablet Take 1 tablet (10 mg) by mouth nightly as needed for sleep 90 tablet 1     Allergies   Allergen Reactions     Penicillins Anaphylaxis     Swelling. Tolerating ceftriaxone       Hydrocodone      Vicodin [Hydrocodone-Acetaminophen] Other (See Comments)     Burning in abd              Review of Systems:     Negative ROS except as noted above in HPI           Physical Exam:     There were no vitals taken for this visit.  Estimated body mass index is 25.58 kg/m  as calculated from the following:    Height as of 2/17/20: 1.626 m (5' 4\").    Weight as of 4/13/20: 67.6 kg (149 lb).    Exam:  Constitutional: healthy, alert and no distress  Psychiatric: mentation appears normal and affect normal/bright            Assessment and Plan     1. Chronic pain syndrome  2. Complex regional pain syndrome type 1 of left lower extremity  Refill Oxycodone x 3 months  Refill gabapentin  Continue exercise    Utox - not done due to phone visit   - expected  FAQ - 60  Care Plan Date: April/2020 reviewed.  Consent - April 2020  Current morphine equivalents/day = 37.5 mg   Naloxone - Yes      - gabapentin (NEURONTIN) 600 MG tablet; Take 2 tablets (1,200 mg) by mouth 3 times daily  Dispense: 540 tablet; Refill: 3  - oxyCODONE (ROXICODONE) 5 MG tablet; Take 1 tablet (5 mg) by mouth every 4 hours as needed for moderate to severe pain  Dispense: 150 tablet; Refill: 0      3. Prob COVID exposure   sick  Test result pending    4. RTC 3 months - video " visit      Refilled medications that would be required in the next 3 months.       Appointment start time:  11:05 AM  Appointment end time: 11:25 AM  This is a telephone visit that took 20 minutes.      Clinician location:  [facility]      Kaleb Mcelroy MD

## 2020-09-07 ENCOUNTER — TELEPHONE (OUTPATIENT)
Dept: FAMILY MEDICINE | Facility: CLINIC | Age: 65
End: 2020-09-07

## 2020-09-07 NOTE — TELEPHONE ENCOUNTER
" Message Return  9/7/2020  10:59 AM    Message returned by Arnaldo He MD    Patient: Hailee Mayo   Phone number-  [phone] (home) none (work)      return their call    Phone conversation with: Patient    Situation: Hailee Mayo  Is a 64 year old  female who is calling because of  \"I think i'm getting a UTI\". Urgency and \"uncomfortable\" dysuria. Duration is 2 days and not getting any better. Urinated at least 6x today. No fever or chills. No nausea or vomiting. No back or flank pain. No abd pain. Last UTI was \"years and years ago- 20 years ago\". \"I hate to go somewhere and be in the car and feel like I have to pee\". Hx of complex regional pain syndrome. Lives in [place].    Advised caller to go to urgent care today OR visit in clinic tomorrow 9/8. Patient wants to be seen either in person or virtually tomorrow, 9/8 which is reasonable. ED precautions given.  Routing to triage RN to call patient first thing 9/8 AM for visit.     Arnaldo He MD  "

## 2020-09-14 ENCOUNTER — VIRTUAL VISIT (OUTPATIENT)
Dept: FAMILY MEDICINE | Facility: CLINIC | Age: 65
End: 2020-09-14
Payer: COMMERCIAL

## 2020-09-14 DIAGNOSIS — B00.9 HERPES SIMPLEX VIRUS INFECTION: ICD-10-CM

## 2020-09-14 DIAGNOSIS — G89.4 CHRONIC PAIN SYNDROME: Primary | ICD-10-CM

## 2020-09-14 DIAGNOSIS — E78.5 HYPERLIPIDEMIA LDL GOAL <130: ICD-10-CM

## 2020-09-14 DIAGNOSIS — G90.522 COMPLEX REGIONAL PAIN SYNDROME TYPE 1 OF LEFT LOWER EXTREMITY: ICD-10-CM

## 2020-09-14 RX ORDER — OXYCODONE HYDROCHLORIDE 5 MG/1
5 TABLET ORAL EVERY 4 HOURS PRN
Qty: 150 TABLET | Refills: 0 | Status: SHIPPED | OUTPATIENT
Start: 2020-09-14 | End: 2020-11-18

## 2020-09-14 RX ORDER — SIMVASTATIN 20 MG
20 TABLET ORAL AT BEDTIME
Qty: 90 TABLET | Refills: 3 | Status: SHIPPED | OUTPATIENT
Start: 2020-09-14 | End: 2021-01-05

## 2020-09-14 RX ORDER — VALACYCLOVIR HYDROCHLORIDE 500 MG/1
500 TABLET, FILM COATED ORAL DAILY
Qty: 180 TABLET | Refills: 3 | Status: SHIPPED | OUTPATIENT
Start: 2020-09-14 | End: 2021-01-05

## 2020-09-14 NOTE — PROGRESS NOTES
"Family Medicine Telephone Visit Note               Telephone Visit Consent   Patient was verbally read the following and verbal consent was obtained.    \"Telephone visits are billed at different rates depending on your insurance coverage. During this emergency period, for some insurers they may be billed the same as an in-person visit.  Please reach out to your insurance provider with any questions.  If during the course of the call the physician/provider feels a telephone visit is not appropriate, you will not be charged for this service.\"    Name person giving consent:  Patient   Date verbal consent given:  9/14/2020  Time verbal consent given:  11:01 AM           Chief Complaint   Patient presents with     Recheck Medication              Due to patient being non-English speaking/uses sign language, an  was used for this visit. Only for face-to-face interpretation by an external agency, date and length of interpretation can be found on the scanned worksheet.           HPI   Patients name: Hailee  Appointment start time:  11:02 AM      1. Chronic Pain / CRPS   Stable      2. Buttock lesion  More spread out this time  Use valtrex      3. UTI  Labor Day weekend  Urgency, dysuria  Called MD on call. Told that Triage would call on Tuesday. No call happened  Drank a lot of fluids  Resolved itself      4. Exercise  24504 steps today  Has lost some wt. Still up 8#  Wear masks until in pool      5. 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 [facility]            Current Outpatient Medications   Medication Sig Dispense Refill     Black Pepper-Turmeric (TURMERIC COMPLEX/BLACK PEPPER) 3-500 MG CAPS        gabapentin (NEURONTIN) 600 MG tablet Take 2 tablets (1,200 mg) by mouth 3 times daily 540 tablet 3     Multiple Vitamins-Minerals (MULTIVITAMIN ADULT PO) Take by mouth daily       oxyCODONE (ROXICODONE) 5 MG tablet Take 1 tablet (5 mg) by mouth every 4 hours as needed for moderate " "to severe pain 150 tablet 0     Probiotic Product (PROBIOTIC DAILY PO) Take by mouth daily       simvastatin (ZOCOR) 20 MG tablet Take 1 tablet (20 mg) by mouth At Bedtime 90 tablet 3     valACYclovir (VALTREX) 500 MG tablet Take 1 tablet (500 mg) by mouth daily 180 tablet 3     zolpidem (AMBIEN) 10 MG tablet Take 1 tablet (10 mg) by mouth nightly as needed for sleep 90 tablet 1     FLUZONE QUADRIVALENT 0.5 ML injection TO BE ADMINISTERED BY PHARMACIST FOR IMMUNIZATION  0     naloxone (NARCAN) nasal spray Spray 1 spray (4 mg) into one nostril alternating nostrils as needed for opioid reversal (every 2-3 minutes until assistance arrives.) 0.2 mL 1     SHINGRIX injection TO BE ADMINISTERED BY PHARMACIST FOR IMMUNIZATION  0     Allergies   Allergen Reactions     Penicillins Anaphylaxis     Swelling. Tolerating ceftriaxone       Hydrocodone      Vicodin [Hydrocodone-Acetaminophen] Other (See Comments)     Burning in abd              Review of Systems:     Negative ROS except as noted above in HPI           Physical Exam:     There were no vitals taken for this visit.  Estimated body mass index is 25.58 kg/m  as calculated from the following:    Height as of 2/17/20: 1.626 m (5' 4\").    Weight as of 4/13/20: 67.6 kg (149 lb).    Exam:  Constitutional: healthy, alert and no distress  Psychiatric: mentation appears normal and affect normal/bright            Assessment and Plan     1. Chronic pain syndrome  2. Complex regional pain syndrome type 1 of left lower extremity  Utox - not done (phone visit)   - expected  FAQ - 55 last visit  Care Plan Date: April 2020  Current morphine equivalents/day = 37.5 mg   Naloxone - NA     - oxyCODONE (ROXICODONE) 5 MG tablet; Take 1 tablet (5 mg) by mouth every 4 hours as needed for moderate to severe pain  Dispense: 150 tablet; Refill: 0      3. Herpes simplex  Recent flare  Just continued daily prophyllaxis  REcommend increase 500mg BID x 3 days for recurrence    - valACYclovir " (VALTREX) 500 MG tablet; Take 1 tablet (500 mg) by mouth daily  Dispense: 180 tablet; Refill: 3    4. Hyperlipidemia LDL goal <130  refill  - simvastatin (ZOCOR) 20 MG tablet; Take 1 tablet (20 mg) by mouth At Bedtime  Dispense: 90 tablet; Refill: 3      Refilled medications that would be required in the next 3 months.     After Visit Information:  Patient chose to view AVS via [employer]    Appointment start time:  11:02 AM  Appointment end time: 11:20 AM  This is a telephone visit that took 18 minutes.      Clinician location:  [facility]     Kaleb Mcelroy MD

## 2020-11-17 DIAGNOSIS — G89.4 CHRONIC PAIN SYNDROME: ICD-10-CM

## 2020-11-18 RX ORDER — OXYCODONE HYDROCHLORIDE 5 MG/1
5 TABLET ORAL EVERY 4 HOURS PRN
Qty: 150 TABLET | Refills: 0 | Status: SHIPPED | OUTPATIENT
Start: 2020-11-18 | End: 2020-12-15

## 2020-11-18 NOTE — TELEPHONE ENCOUNTER
Patient calling to check status of refill request, as pharmacy has not received anything. Advised patient waiting review by provider; will send another message.  Patient expressed understanding.    Patient is out of med.  
Patient requesting refill of oxycodone- will be out today. Last office visit 9/14/20 with Dr. Mcelroy. Per message below, patient is requesting refill for both Nov and Dec as patient's insurance will be changing at the end of the year. Routing to PCP to approve if appropriate.   Yumiko Krishnan RN    
Rx filled for one month  Znaptag message sent to make video appt  PHarper  
Verify that the refill encounter hasn't been started Yes    [facility] phone call message- patient requesting a refill:    Full Medication Name: oxyCODONE (ROXICODONE) 5 MG tablet    Dose: see chart     Ph[facility]   [id number] [place], [place] - [address]  [address] [place] [address]  Phone: [phone] Fax: [phone]  : Yes    Medication tab checked to see if medication has been sent  Yes    Additional Comments: Patient requesting refill for November and December, as insurance will be changing 12/31/20. Patient will run out of med today.     OK to leave a message on voice mail? Yes    Advised patient refill may take up to 2 business days? Yes    Primary language: English      needed? No    Call taken on November 17, 2020 at 9:05 AM by Krystin Marvin    Route to [facility] MED REFILL    
No

## 2020-12-04 ENCOUNTER — TELEPHONE (OUTPATIENT)
Dept: FAMILY MEDICINE | Facility: CLINIC | Age: 65
End: 2020-12-04

## 2020-12-04 DIAGNOSIS — G47.00 INSOMNIA, UNSPECIFIED TYPE: ICD-10-CM

## 2020-12-04 RX ORDER — ZOLPIDEM TARTRATE 10 MG/1
10 TABLET ORAL
Qty: 90 TABLET | Refills: 1 | Status: SHIPPED | OUTPATIENT
Start: 2020-12-04 | End: 2021-01-05

## 2020-12-04 NOTE — TELEPHONE ENCOUNTER
Verify that the refill encounter hasn't been started Yes    [facility] phone call message- patient requesting a refill:    Full Medication Name: zolpidem (AMBIEN) 10 MG tablet       Dose: Take 1 tablet (10 mg) by mouth nightly as needed for sleep - Oral     Pharmacy confirmed as   [facility] [place]   [address] [place] [address]  PH: [phone]    : Yes    Medication tab checked to see if medication has been sent  Yes    Additional Comments: Pt requesting one time refill until her insurance starts on 01/01/21     OK to leave a message on voice mail? Yes    Advised patient refill may take up to 2 business days? No:     Primary language: English      needed? No    Call taken on December 4, 2020 at 10:23 AM by Pauline Huertas    Route to  [facility] MED REFILL

## 2020-12-11 DIAGNOSIS — G89.4 CHRONIC PAIN SYNDROME: ICD-10-CM

## 2020-12-11 NOTE — LETTER
Hailee Mayo  [id number] [address] [place] [id number]    December 15, 2020        Dear Hailee  I refilled your medication today for one month.  Please call [phone] to schedule an appointment for next month..      Sincerely    PHarper

## 2020-12-11 NOTE — TELEPHONE ENCOUNTER
Verify that the refill encounter hasn't been started Yes    [facility] phone call message- patient requesting a refill:    Full Medication Name: oxyCODONE (ROXICODONE) 5 MG tablet       Dose: tablet (5 mg) by mouth every 4 hours as needed for moderate to severe pain -      Pharmacy confirmed as   [facility] [address]  [address] [facility] [address]  Phone: [phone] Fax: [phone]      : Yes    Medication tab checked to see if medication has been sent  Yes    Additional Comments:   Pt requesting one time refill until her insurance starts on 01/01/21     OK to leave a message on voice mail? Yes    Advised patient refill may take up to 2 business days? No:     Primary language: English      needed? No    Call taken on December 11, 2020 at 11:50 AM by Pauline Huertas    Route to [facility] REFILL

## 2020-12-11 NOTE — TELEPHONE ENCOUNTER
"Patient requesting refill of oxycodone. Last office visit 9/14/20 with Dr. Mcelroy. Per note- patient requesting 1x refill as her new insurance starts 1/1/21. Routing to PCP to approve if appropriate.   Yumiko Krishnan RN      Request for medication refill:    Providers if patient needs an appointment and you are willing to give a one month supply please refill for one month and  send a letter/MyChart using \".SMILLIMITEDREFILL\" .smillimited and route chart to \"P [facility] \" (Giving one month refill in non controlled medications is strongly recommended before denial)    If refill has been denied, meaning absolutely no refills without visit, please complete the smart phrase \".smirxrefuse\" and route it to the \"P [facility] MED REFILLS\"  pool to inform the patient and the pharmacy.          "

## 2020-12-14 ENCOUNTER — HEALTH MAINTENANCE LETTER (OUTPATIENT)
Age: 65
End: 2020-12-14

## 2020-12-15 RX ORDER — OXYCODONE HYDROCHLORIDE 5 MG/1
5 TABLET ORAL EVERY 4 HOURS PRN
Qty: 150 TABLET | Refills: 0 | Status: SHIPPED | OUTPATIENT
Start: 2020-12-15 | End: 2021-01-05

## 2021-01-05 ENCOUNTER — VIRTUAL VISIT (OUTPATIENT)
Dept: FAMILY MEDICINE | Facility: CLINIC | Age: 66
End: 2021-01-05
Payer: COMMERCIAL

## 2021-01-05 DIAGNOSIS — G47.00 INSOMNIA, UNSPECIFIED TYPE: ICD-10-CM

## 2021-01-05 DIAGNOSIS — B00.9 HERPES SIMPLEX VIRUS INFECTION: ICD-10-CM

## 2021-01-05 DIAGNOSIS — G90.50 REFLEX SYMPATHETIC DYSTROPHY: ICD-10-CM

## 2021-01-05 DIAGNOSIS — E78.5 HYPERLIPIDEMIA LDL GOAL <130: ICD-10-CM

## 2021-01-05 DIAGNOSIS — G90.522 COMPLEX REGIONAL PAIN SYNDROME TYPE 1 OF LEFT LOWER EXTREMITY: ICD-10-CM

## 2021-01-05 DIAGNOSIS — G89.4 CHRONIC PAIN SYNDROME: Primary | ICD-10-CM

## 2021-01-05 PROCEDURE — 99213 OFFICE O/P EST LOW 20 MIN: CPT | Mod: 95 | Performed by: FAMILY MEDICINE

## 2021-01-05 RX ORDER — OXYCODONE HYDROCHLORIDE 5 MG/1
5 TABLET ORAL EVERY 4 HOURS PRN
Qty: 150 TABLET | Refills: 0 | Status: SHIPPED | OUTPATIENT
Start: 2021-02-05 | End: 2021-01-05

## 2021-01-05 RX ORDER — ZOLPIDEM TARTRATE 10 MG/1
10 TABLET ORAL
Qty: 90 TABLET | Refills: 1 | Status: SHIPPED | OUTPATIENT
Start: 2021-01-05 | End: 2021-05-25

## 2021-01-05 RX ORDER — OXYCODONE HYDROCHLORIDE 5 MG/1
5 TABLET ORAL EVERY 4 HOURS PRN
Qty: 150 TABLET | Refills: 0 | Status: SHIPPED | OUTPATIENT
Start: 2021-01-05 | End: 2021-01-05

## 2021-01-05 RX ORDER — SIMVASTATIN 20 MG
20 TABLET ORAL AT BEDTIME
Qty: 90 TABLET | Refills: 3 | Status: SHIPPED | OUTPATIENT
Start: 2021-01-05 | End: 2021-12-20

## 2021-01-05 RX ORDER — GABAPENTIN 600 MG/1
1200 TABLET ORAL 3 TIMES DAILY
Qty: 540 TABLET | Refills: 3 | Status: SHIPPED | OUTPATIENT
Start: 2021-01-05 | End: 2022-01-03

## 2021-01-05 RX ORDER — OXYCODONE HYDROCHLORIDE 5 MG/1
5 TABLET ORAL EVERY 4 HOURS PRN
Qty: 150 TABLET | Refills: 0 | Status: SHIPPED | OUTPATIENT
Start: 2021-03-05 | End: 2021-01-07

## 2021-01-05 RX ORDER — VALACYCLOVIR HYDROCHLORIDE 500 MG/1
500 TABLET, FILM COATED ORAL DAILY
Qty: 180 TABLET | Refills: 3 | Status: SHIPPED | OUTPATIENT
Start: 2021-01-05 | End: 2022-01-03

## 2021-01-05 NOTE — PROGRESS NOTES
Family Medicine Telephone Visit Note             HPI   Patients name: Hailee  Appointment start time:  10:00 AM      1. Chronic Pain /    database - checked and expected results  Stable  Some increasing problems with tripping, vacuuming, concerns for driving (not as quick as before)          2. Preventive  Mammo - wait until covid vaccination  Flu - yes  Fall risk - maybe one fall (no injuries)      3. Exercise  Has gained wt  Not going to pool      4. Pharmacy  New pharmacy - [facility]  Uncertain where to send. Much time spent trying to figure out      5. Guardianship  Becoming the guardian for Yissel          Current Outpatient Medications   Medication Sig Dispense Refill     Black Pepper-Turmeric (TURMERIC COMPLEX/BLACK PEPPER) 3-500 MG CAPS        FLUZONE QUADRIVALENT 0.5 ML injection TO BE ADMINISTERED BY PHARMACIST FOR IMMUNIZATION  0     gabapentin (NEURONTIN) 600 MG tablet Take 2 tablets (1,200 mg) by mouth 3 times daily 540 tablet 3     Multiple Vitamins-Minerals (MULTIVITAMIN ADULT PO) Take by mouth daily       naloxone (NARCAN) nasal spray Spray 1 spray (4 mg) into one nostril alternating nostrils as needed for opioid reversal (every 2-3 minutes until assistance arrives.) 0.2 mL 1     oxyCODONE (ROXICODONE) 5 MG tablet Take 1 tablet (5 mg) by mouth every 4 hours as needed for moderate to severe pain 150 tablet 0     Probiotic Product (PROBIOTIC DAILY PO) Take by mouth daily       SHINGRIX injection TO BE ADMINISTERED BY PHARMACIST FOR IMMUNIZATION  0     simvastatin (ZOCOR) 20 MG tablet Take 1 tablet (20 mg) by mouth At Bedtime 90 tablet 3     valACYclovir (VALTREX) 500 MG tablet Take 1 tablet (500 mg) by mouth daily 180 tablet 3     zolpidem (AMBIEN) 10 MG tablet Take 1 tablet (10 mg) by mouth nightly as needed for sleep 90 tablet 1     Allergies   Allergen Reactions     Penicillins Anaphylaxis     Swelling. Tolerating ceftriaxone       Hydrocodone      Vicodin [Hydrocodone-Acetaminophen] Other  "(See Comments)     Burning in abd              Review of Systems:     Negative ROS except as noted above in HPI           Physical Exam:     There were no vitals taken for this visit.  Estimated body mass index is 25.58 kg/m  as calculated from the following:    Height as of 2/17/20: 1.626 m (5' 4\").    Weight as of 4/13/20: 67.6 kg (149 lb).    Exam:  Constitutional: healthy, alert and no distress  Psychiatric: mentation appears normal and affect normal/bright    FAQ5 = 65        Assessment and Plan       1. Chronic pain syndrome  2. Complex regional pain syndrome type 1 of left lower extremity  Stable  Refill opiates x 3 months    Utox - phone visit due to COVID   - expected   FAQ - 65   Care Plan Date: April/2020 reviewed.   Consent - April 2020   Current morphine equivalents/day = 37.5 mg   Naloxone - Yes      3. Pharmacy issues  Now on Medicare and needs new mail order  Her info and Epic are not matching up  She will call [employer] and find correct source and call back  Will then update all medications      4. Preventive  Fall risk done  Defer mammo until after Covid vaccine    5. RTC 3 months        Appointment start time:  10:00 AM  Appointment end time: 10:25 AM  More needed after she gets pharmacy location  This is a telephone visit that took 25+ 8-10 extra minutes after hanging up       Clinician location:  [facility] JHONATAN Mcelroy MD      "

## 2021-01-07 ENCOUNTER — TELEPHONE (OUTPATIENT)
Dept: FAMILY MEDICINE | Facility: CLINIC | Age: 66
End: 2021-01-07

## 2021-01-07 DIAGNOSIS — G89.4 CHRONIC PAIN SYNDROME: ICD-10-CM

## 2021-01-07 RX ORDER — OXYCODONE HYDROCHLORIDE 5 MG/1
5 TABLET ORAL EVERY 4 HOURS PRN
Qty: 450 TABLET | Refills: 0 | Status: SHIPPED | OUTPATIENT
Start: 2021-03-05 | End: 2021-05-25

## 2021-01-07 NOTE — TELEPHONE ENCOUNTER
See message below that new rx needs to be faxed to [facility] for oxycodone 90 day supply due to insurance. RN unable to fill as it is a controlled substance. Routing to PCP to re order if appropriate.   Yumiko Krishnan, RN

## 2021-01-07 NOTE — TELEPHONE ENCOUNTER
Verify that the refill encounter hasn't been started Yes    [facility] phone call message- patient requesting a refill:    Full Medication Name: oxyCODONE (ROXICODONE) 5 MG tablet    Dose: see chart     Pharmacy confirmed as:  [facility] - [place], [place] - [address] [place]  [address] [facility] [address]  Phone: [phone] Fax: [phone]  Fax [phone]: Yes    Medication tab checked to see if medication has been sent  Yes    Additional Comments: Patient calling to advise just switched over to Medicare effective 1/1/21 and pharmacy filling 90 day supply of all meds except oxycodone. Per patient, pharmacy can fill 7 day supply for first fill; needs prescription from provider for 90 day supply going forward. Please fax prescription to [phone].     OK to leave a message on voice mail? Yes    Advised patient refill may take up to 2 business days? Yes    Primary language: English      needed? No    Call taken on January 7, 2021 at 1:27 PM by Krystin Lerma to P [facility] MED REFILL

## 2021-01-07 NOTE — TELEPHONE ENCOUNTER
Previously sent 3 Rx for one month each  Apparently [facility] wants all 3 months at once    P  90 day supply oxycodone    Fouzia

## 2021-01-15 ENCOUNTER — HEALTH MAINTENANCE LETTER (OUTPATIENT)
Age: 66
End: 2021-01-15

## 2021-02-27 ENCOUNTER — HEALTH MAINTENANCE LETTER (OUTPATIENT)
Age: 66
End: 2021-02-27

## 2021-05-11 ENCOUNTER — TELEPHONE (OUTPATIENT)
Dept: FAMILY MEDICINE | Facility: CLINIC | Age: 66
End: 2021-05-11

## 2021-05-11 DIAGNOSIS — Z12.12 SCREENING FOR COLORECTAL CANCER: Primary | ICD-10-CM

## 2021-05-11 DIAGNOSIS — Z12.11 SCREENING FOR COLORECTAL CANCER: Primary | ICD-10-CM

## 2021-05-11 NOTE — TELEPHONE ENCOUNTER
[facility] Family [facility] phone call message - order or referral request from patient:     Order or referral being requested: Referral to Gastroenterology  At Location:     Additional Details: Patient requesting referral for Colonoscopy    Referral only -Specialty  Location     OK to leave a message on voice mail? Yes    Primary language: English      needed? No    Call taken on May 11, 2021 at 11:48 AM by April Bivens    Order request route to [facility] TRIAGE   Referrals Route to [facility] (Green/[facility]/Purple) CARE COORDINATOR

## 2021-05-25 ENCOUNTER — VIRTUAL VISIT (OUTPATIENT)
Dept: FAMILY MEDICINE | Facility: CLINIC | Age: 66
End: 2021-05-25
Payer: COMMERCIAL

## 2021-05-25 DIAGNOSIS — G47.00 INSOMNIA, UNSPECIFIED TYPE: ICD-10-CM

## 2021-05-25 DIAGNOSIS — G89.4 CHRONIC PAIN SYNDROME: Primary | ICD-10-CM

## 2021-05-25 DIAGNOSIS — G90.522 COMPLEX REGIONAL PAIN SYNDROME TYPE 1 OF LEFT LOWER EXTREMITY: ICD-10-CM

## 2021-05-25 DIAGNOSIS — Z02.9 ADMINISTRATIVE ENCOUNTER: ICD-10-CM

## 2021-05-25 PROCEDURE — 99214 OFFICE O/P EST MOD 30 MIN: CPT | Mod: 95 | Performed by: FAMILY MEDICINE

## 2021-05-25 RX ORDER — OXYCODONE HYDROCHLORIDE 5 MG/1
5 TABLET ORAL EVERY 4 HOURS PRN
Qty: 450 TABLET | Refills: 0 | Status: SHIPPED | OUTPATIENT
Start: 2021-05-25 | End: 2021-08-09

## 2021-05-25 RX ORDER — ZOLPIDEM TARTRATE 10 MG/1
10 TABLET ORAL
Qty: 90 TABLET | Refills: 1 | Status: SHIPPED | OUTPATIENT
Start: 2021-05-25 | End: 2021-09-29

## 2021-05-25 ASSESSMENT — ANXIETY QUESTIONNAIRES
5. BEING SO RESTLESS THAT IT IS HARD TO SIT STILL: NOT AT ALL
IF YOU CHECKED OFF ANY PROBLEMS ON THIS QUESTIONNAIRE, HOW DIFFICULT HAVE THESE PROBLEMS MADE IT FOR YOU TO DO YOUR WORK, TAKE CARE OF THINGS AT HOME, OR GET ALONG WITH OTHER PEOPLE: NOT DIFFICULT AT ALL
1. FEELING NERVOUS, ANXIOUS, OR ON EDGE: NOT AT ALL
7. FEELING AFRAID AS IF SOMETHING AWFUL MIGHT HAPPEN: NOT AT ALL
3. WORRYING TOO MUCH ABOUT DIFFERENT THINGS: NOT AT ALL
2. NOT BEING ABLE TO STOP OR CONTROL WORRYING: NOT AT ALL
GAD7 TOTAL SCORE: 0
6. BECOMING EASILY ANNOYED OR IRRITABLE: NOT AT ALL

## 2021-05-25 ASSESSMENT — PATIENT HEALTH QUESTIONNAIRE - PHQ9: 5. POOR APPETITE OR OVEREATING: NOT AT ALL

## 2021-05-25 NOTE — PATIENT INSTRUCTIONS
Hailee Mayo  [id number]         May 25, 2021  Informed  Consent  and  Agreement  for  Opioid  Therapy  of  Pain        Reason  for  Review  and  Signing  of  this  Document       Pain  relief  is  an  important  goal  for  your  care.  Opioid  medications  may  be  a  helpful  part  of   chronic  pain  treatment  for  some  people;  however,  misuse  of  opioid  medications  may result  in   serious  harm  to  patients  prescribed  them  and,  when  the  medications  are  diverted,  to  the  public  at   large.  As  opioid  use  for  pain  management  has  increased  in  recent  years,  injury,  addiction,  and   death  due  to  misuse  of  opioids  have  also  increased.    Patients  and  health  care  providers  both  have  responsibilities  for  the  safe  use  of  opioid   medications  when  they  are  prescribed  for  pain.  This  agreement  provides  important information   on  the  potential  benefits  and  risks  of  opioid  medications  and shows that both  you and  your  provider  agree  on  a  care  plan  so  that  opioid  medications  are  used  in  a  way  that  is  safe   and  effective  in  treating  your  pain.    This  agreement  is  reviewed  and  signed  by  all  patients  in  our   practice  who  receive  opioids  for  chronic  pain.          Expected  Benefits  or  Goals  of  Opioid  Treatment     Improved  pain      Improved  ability  to  engage  in  work,  social,  recreational  and/or  physical  activities      Improved  quality  of  life            Potential Risks or Side Effects of Opioid Treatment    Overdose Taking more than the prescribed amount of medication or using with alcohol or other drugs can cause you to stop breathing, resulting in coma, brain damage, or even death. Every single day, more than 40 Americans die from prescription opioid overdoses. The Center for Disease Control and Prevention (CDC) has declared that the [place] is in the middle of an epidemic opioid overdose  deaths.      Physical side effects May include mood changes, drowsiness, nausea, constipation, urination difficulties, depressed breathing, itching, bone thinning and sexual difficulties, such as lowering male hormone in men and stopping menstrual periods in women.      Physical dependence Suddenly stopping an opioid may lead to withdrawal symptoms including abdominal cramping, pain, diarrhea, sweating, anxiety, irritability and aching.     Tolerance A dose of an opioid may become less effective over time even though there is no change in your physical condition. If this happens repeatedly, your medication may need to be changed or discontinued.      Addiction Is more common in people with personal or family history of addiction, but can occur in anyone. It is suggested by drug craving, loss of control and may lead to money, relationship, or legal problems.     Hyperalgesia The use of opioids can increase the experience of pain. If this happens, it may require change or discontinuation of medication.      Sleep apnea  (periods of not breathing while asleep) may be caused or worsened by opioids.   Risk to unborn child Risks to unborn children may include: physical dependence at birth, possible alterations in pain perception, possible increased risk for addiction later in life, among others. Tell your provider if you are or intend to become pregnant.       Victimization There is a risk that you or someone in your household may be the victim of theft, deceit, assault or abuse by people trying to take your medications to misuse them.          Responsibilities  in  Opioid  Therapy  of  Chronic  Pain      Your provider's responsibilities: listening carefully to your concerns, treating you with  care and with due respect, and making clinical decisions based on what he/she believes is in  your best interest.    Your responsibilities: In order to maximize the potential benefits of opioid medications and to  minimize the  potential risks, it is important that you accept the following responsibilities. In  signing this agreement, you agree to:    1. Use your opioid medications as prescribed for the purpose of relieving pain  2. Keep your medications locked up to avoid intentional or unintentional use or diversion  by others. Discard all unused medications.  3. Be honest with your providers about your medication or other drug use.  4. Use no illegal drugs and not abuse alcohol while being prescribed opioids.  5. Not to share, sell, trade or in any way provide your medications to others.  6. Receive opioid medications from this practice only. If opioids are prescribed  unexpectedly by another office (for example due to an accident or dental procedure),  inform this office within 24 hours.  7. Fill your opioid medications at one pharmacy only. Inform this practice within 24 hours  if you must use a pharmacy different from your usual one.  8. Have urine drugs tests on a random basis and as requested by your provider. (Opioid  may be discontinued if the tests are declined illicit drugs found or medication not present when should be.)  9. Bring your opioid medications to the practice when requested.  10. Participate in other pain treatments agreed to with your provider and keep all  appointments scheduled for your care.  11. Permit this practice to communicate with other care providers and/or your significant  others as needed to assure opioids are being used appropriately and are beneficial to  your health and well-being  12.  I understand that my clinic can track controlled substance prescriptions from other providers through a central database (prescription monitoring program).  13.   I will tell my clinic right away if I become pregnant or have a new medical problem treated at another clinic    Your medications may be continued if they improve your pain, help you engage in valued  activities, and/or enhance your quality of life and if  you follow the above responsibilities.  Your medications may be discontinued if your goals for treatment are not met, if you experience negative  effects from using them, or if you do not follow this agreement.  If you develop complications of opioid use, such as addiction, we will assist you in finding  treatment. Please be aware, however, that our practice cooperates fully with law enforcement,  the [place] Drug Enforcement Agency and other agencies in the investigation of opioid-related  crimes including sharing, selling, trading or other potential harmful use of these powerful  Medications.    I have reviewed this document and been given the opportunity to have any questions  answered. I understand the possible benefits and risks of opioid medications and I accept the  responsibilities described above.    __________________________________         ____________________________________  Patient     Date   Kaleb Mcelroy MD                     Date          Personal Care Plan for Chronic Pain  Reviewed 4/13/2020     1.  Personal Goals:  Engaging and spending time with my grandkids as much as I can, getting to my water aerobics and spending that time with the others int he class and my mom, spending time with my mom and my sister, feel like I am still of use in this world, that I am important to my grandma, a good partner to my , to stay in my house and keep it clean        2.  Sleep:                          *  Basic sleep plan:                                              *reduce or eliminate caffeine and daytime naps                                              * relaxation before bed                                              * limit screen time 1-2 hours prior to bed                                              * establish dark/quiet sleep environment                        *  Nighttime medications including the following: zolpidem     3.  Physical Activity:                          * Home/community based  activity:                                              * Yoga dvd 1x/week                                              * water aerobics 3-4x/week:                          * Listen to your body.  Pace yourself for success.  Don't over-do it.  Don't under do it.                        * Balance activities with rest and set realistic goals.      4.  Nutrition/Weight:                          * Try to eat at least 5 servings of fresh fruits and vegetables each day.                        * Limit processed foods and foods high in sugar, sodium and fat.                        * Maintain a healthy weight.      5.  Mood/Stress Management:                         * Listening to healing cd for relaxation and pain management - can use for acute pain, but it's also helpful to do this daily to help retrain brain                        * Listening [place] music                        * Taking sertraline medication daily     6.  Pain:                          * Non-medication treatments:                                              * light massage as tolerated     7.  Pain Medications: oxycodone as prescribed                 JEAN CLAUDE Villarreal, GORAN Rodriguez, MANOJ Parrish, DENYS Acosta., JIMY New., & CASSY Mcgovern.ETHAN. Cognitive behavioral therapy for chronic pain among veterans: Therapist manual. [place]: [place] [employer].          JEAN CLAUDE Villarreal McKellar, J.D., TYLER Parrish., DENYS Acosta., JIMY New., & Shaniqua, B.E. Cognitive behavioral therapy for chronic pain among veterans: Therapist manual. [place]: [place] [employer].

## 2021-05-25 NOTE — PROGRESS NOTES
Hailee is a 65 year old who is being evaluated via a billable telephone visit.          Assessment & Plan     Chronic pain syndrome  Complex regional pain syndrome type 1 of left lower extremity    Utox - not done -virtual visit   - expected   FAQ - 60   Care Plan Date: May 2021 reviewed.   Consent - May 2021   Current morphine equivalents/day = 37.5 mg   Naloxone - Yes    Discussed reducing  She does not see a way    3 month refills with mail order    - oxyCODONE (ROXICODONE) 5 MG tablet; Take 1 tablet (5 mg) by mouth every 4 hours as needed for moderate to severe pain      Insomnia, unspecified type  Long discussion re:origin of ambien use  Started during  days  Has tried 5 mg and none without success    - zolpidem (AMBIEN) 10 MG tablet; Take 1 tablet (10 mg) by mouth nightly as needed for sleep      Administrative encounter  Handicap Parking certificate done                     Kaleb Mcelroy MD  [facility]            Ashley Stephen is a 65 year old who presents for the following health issues     HPI   1. Chronic Pain  Oxy 1 6am- 1, 930am-1, afternon - 1, early evening - 1, bed - 1  After vaccuuming - 1-2        2. Wt  Exercise - Mid April  Wt - 150-->173      3. Insomnia  Reviewed background  See summary       4. Handicap sticker  Fall - feb 2021 on basement steps  Uses cane - winter, grass, off sidewalk          5. Preventive  Colonoscopy  Mammogram                    Review of Systems         Objective           Vitals:  No vitals were obtained today due to virtual visit.    Physical Exam   healthy, alert and no distress  PSYCH: Alert and oriented times 3; coherent speech, normal   rate and volume, able to articulate logical thoughts, able   to abstract reason, no tangential thoughts, no hallucinations   or delusions  Her affect is normal  RESP: No cough, no audible wheezing, able to talk in full sentences  Remainder of exam unable to be completed due  to telephone visits      PHQ 2/5/2018 4/13/2020 5/25/2021   PHQ-9 Total Score 3 3 -   Q9: Thoughts of better off dead/self-harm past 2 weeks Not at all Not at all Not at all     EDUAR-7 SCORE 2/5/2018 4/13/2020 5/25/2021   Total Score - - -   Total Score 0 1 0                     Start 11:44 AM  End 12:15 PM  Phone call duration: 31 minutes + 15min handicap parking cerficate

## 2021-05-26 ASSESSMENT — ANXIETY QUESTIONNAIRES: GAD7 TOTAL SCORE: 0

## 2021-06-22 DIAGNOSIS — Z11.59 ENCOUNTER FOR SCREENING FOR OTHER VIRAL DISEASES: Primary | ICD-10-CM

## 2021-06-22 DIAGNOSIS — Z11.59 ENCOUNTER FOR SCREENING FOR OTHER VIRAL DISEASES: ICD-10-CM

## 2021-06-22 LAB
LABORATORY COMMENT REPORT: NORMAL
SARS-COV-2 RNA RESP QL NAA+PROBE: NEGATIVE
SARS-COV-2 RNA RESP QL NAA+PROBE: NORMAL
SPECIMEN SOURCE: NORMAL
SPECIMEN SOURCE: NORMAL

## 2021-06-22 PROCEDURE — U0003 INFECTIOUS AGENT DETECTION BY NUCLEIC ACID (DNA OR RNA); SEVERE ACUTE RESPIRATORY SYNDROME CORONAVIRUS 2 (SARS-COV-2) (CORONAVIRUS DISEASE [COVID-19]), AMPLIFIED PROBE TECHNIQUE, MAKING USE OF HIGH THROUGHPUT TECHNOLOGIES AS DESCRIBED BY CMS-2020-01-R: HCPCS | Performed by: SPECIALIST

## 2021-06-22 PROCEDURE — U0005 INFEC AGEN DETEC AMPLI PROBE: HCPCS | Performed by: SPECIALIST

## 2021-06-22 RX ORDER — SODIUM, POTASSIUM,MAG SULFATES 17.5-3.13G
2 SOLUTION, RECONSTITUTED, ORAL ORAL SEE ADMIN INSTRUCTIONS
Qty: 354 ML | Refills: 0 | Status: SHIPPED | OUTPATIENT
Start: 2021-06-22 | End: 2021-08-09

## 2021-06-22 RX ORDER — BISACODYL 5 MG/1
15 TABLET, DELAYED RELEASE ORAL SEE ADMIN INSTRUCTIONS
Qty: 3 TABLET | Refills: 0 | Status: SHIPPED | OUTPATIENT
Start: 2021-06-22 | End: 2021-08-09

## 2021-06-22 ASSESSMENT — MIFFLIN-ST. JEOR: SCORE: 1301.11

## 2021-06-24 ENCOUNTER — HOSPITAL ENCOUNTER (OUTPATIENT)
Facility: AMBULATORY SURGERY CENTER | Age: 66
Discharge: HOME | End: 2021-06-24
Attending: INTERNAL MEDICINE | Admitting: INTERNAL MEDICINE
Payer: COMMERCIAL

## 2021-06-24 VITALS
OXYGEN SATURATION: 97 % | SYSTOLIC BLOOD PRESSURE: 115 MMHG | DIASTOLIC BLOOD PRESSURE: 73 MMHG | BODY MASS INDEX: 29.02 KG/M2 | TEMPERATURE: 97.8 F | HEIGHT: 64 IN | RESPIRATION RATE: 16 BRPM | WEIGHT: 170 LBS | HEART RATE: 62 BPM

## 2021-06-24 DIAGNOSIS — Z12.11 SCREEN FOR COLON CANCER: Primary | ICD-10-CM

## 2021-06-24 LAB — COLONOSCOPY: NORMAL

## 2021-06-24 PROCEDURE — 88305 TISSUE EXAM BY PATHOLOGIST: CPT | Performed by: PATHOLOGY

## 2021-06-24 PROCEDURE — G8918 PT W/O PREOP ORDER IV AB PRO: HCPCS

## 2021-06-24 PROCEDURE — 45385 COLONOSCOPY W/LESION REMOVAL: CPT | Mod: PT

## 2021-06-24 PROCEDURE — G8907 PT DOC NO EVENTS ON DISCHARG: HCPCS

## 2021-06-24 RX ORDER — NALOXONE HYDROCHLORIDE 0.4 MG/ML
0.2 INJECTION, SOLUTION INTRAMUSCULAR; INTRAVENOUS; SUBCUTANEOUS
Status: DISCONTINUED | OUTPATIENT
Start: 2021-06-24 | End: 2021-06-25 | Stop reason: HOSPADM

## 2021-06-24 RX ORDER — ONDANSETRON 2 MG/ML
4 INJECTION INTRAMUSCULAR; INTRAVENOUS
Status: DISCONTINUED | OUTPATIENT
Start: 2021-06-24 | End: 2021-06-25 | Stop reason: HOSPADM

## 2021-06-24 RX ORDER — ONDANSETRON 4 MG/1
4 TABLET, ORALLY DISINTEGRATING ORAL EVERY 6 HOURS PRN
Status: DISCONTINUED | OUTPATIENT
Start: 2021-06-24 | End: 2021-06-25 | Stop reason: HOSPADM

## 2021-06-24 RX ORDER — PROCHLORPERAZINE MALEATE 5 MG
5 TABLET ORAL EVERY 6 HOURS PRN
Status: DISCONTINUED | OUTPATIENT
Start: 2021-06-24 | End: 2021-06-25 | Stop reason: HOSPADM

## 2021-06-24 RX ORDER — ONDANSETRON 2 MG/ML
4 INJECTION INTRAMUSCULAR; INTRAVENOUS EVERY 6 HOURS PRN
Status: DISCONTINUED | OUTPATIENT
Start: 2021-06-24 | End: 2021-06-25 | Stop reason: HOSPADM

## 2021-06-24 RX ORDER — FLUMAZENIL 0.1 MG/ML
0.2 INJECTION, SOLUTION INTRAVENOUS
Status: ACTIVE | OUTPATIENT
Start: 2021-06-24 | End: 2021-06-24

## 2021-06-24 RX ORDER — FENTANYL CITRATE 50 UG/ML
INJECTION, SOLUTION INTRAMUSCULAR; INTRAVENOUS PRN
Status: DISCONTINUED | OUTPATIENT
Start: 2021-06-24 | End: 2021-06-24 | Stop reason: HOSPADM

## 2021-06-24 RX ORDER — NALOXONE HYDROCHLORIDE 0.4 MG/ML
0.4 INJECTION, SOLUTION INTRAMUSCULAR; INTRAVENOUS; SUBCUTANEOUS
Status: DISCONTINUED | OUTPATIENT
Start: 2021-06-24 | End: 2021-06-25 | Stop reason: HOSPADM

## 2021-06-24 RX ORDER — LIDOCAINE 40 MG/G
CREAM TOPICAL
Status: DISCONTINUED | OUTPATIENT
Start: 2021-06-24 | End: 2021-06-25 | Stop reason: HOSPADM

## 2021-06-29 LAB — COPATH REPORT: NORMAL

## 2021-08-07 ENCOUNTER — TELEPHONE (OUTPATIENT)
Dept: FAMILY MEDICINE | Facility: CLINIC | Age: 66
End: 2021-08-07

## 2021-08-07 NOTE — TELEPHONE ENCOUNTER
Message Return  8/7/2021  6:07 PM    Message returned by Krystin Barros MD    Patient: Hailee Mayo   Phone number-  [phone] (home) none (work)      return their call  Phone conversation with: Patient    Situation: Hailee Mayo  Is a 65 year old  female who is calling because of  Concern for UTI.   Background : Urgency and dysuria. No fevers. No back pain. Maybe a ting of pink on toilet. Not cloudy yet.  No recent antibiotic use.    Assessment: Possible UTI with hx of Proteus Mirabilis with resistance to Nitrofurantoin   Recommendation/Plan: Going to Urgent Care or Clinic on Monday  Advised caller to Patient understands the suggested plan and agrees to follow though.    Krystin Barros MD

## 2021-08-09 ENCOUNTER — VIRTUAL VISIT (OUTPATIENT)
Dept: FAMILY MEDICINE | Facility: CLINIC | Age: 66
End: 2021-08-09
Payer: COMMERCIAL

## 2021-08-09 DIAGNOSIS — S69.91XD INJURY OF FINGER OF RIGHT HAND, SUBSEQUENT ENCOUNTER: ICD-10-CM

## 2021-08-09 DIAGNOSIS — G90.522 COMPLEX REGIONAL PAIN SYNDROME TYPE 1 OF LEFT LOWER EXTREMITY: ICD-10-CM

## 2021-08-09 DIAGNOSIS — G89.4 CHRONIC PAIN SYNDROME: Primary | ICD-10-CM

## 2021-08-09 DIAGNOSIS — N30.00 ACUTE CYSTITIS WITHOUT HEMATURIA: ICD-10-CM

## 2021-08-09 PROCEDURE — 99214 OFFICE O/P EST MOD 30 MIN: CPT | Mod: 95 | Performed by: FAMILY MEDICINE

## 2021-08-09 RX ORDER — OXYCODONE HYDROCHLORIDE 5 MG/1
5 TABLET ORAL EVERY 4 HOURS PRN
Qty: 450 TABLET | Refills: 0 | Status: SHIPPED | OUTPATIENT
Start: 2021-08-09 | End: 2021-09-29

## 2021-08-09 NOTE — PROGRESS NOTES
"Hailee is a 65 year old who is being evaluated via a billable telephone visit.          Start - 11:02 AM  End - 11:23 AM        Assessment & Plan     Chronic pain syndrome  Complex regional pain syndrome type 1 of left lower extremity    Discussed reducing oxycodone. She does not see a path forward on this  Care plan being used. Exercise still excellent    Utox - not done -virtual visit   - expected   FAQ - 60   Care Plan Date: May 2021 reviewed.   Consent - May 2021   Current morphine equivalents/day = 37.5 mg   Naloxone - Yes      - oxyCODONE (ROXICODONE) 5 MG tablet; Take 1 tablet (5 mg) by mouth every 4 hours as needed for moderate to severe pain        Injury of finger of right hand, subsequent encounter  No further eval    Acute cystitis without hematuria  finish antibiotics  May be related to pool                 Kaleb Mcelroy MD  [facility]          Ashley Stephen is a 65 year old who presents for the following health issues     HPI   1. Chronic Pain  CRPS - no change  L Ankle bone and up leg - stingy that it hurts  Mostly in toes    R \"tingly\" but does not hurt    Has tried creams  Has tried THC cream        2. Finger injury  Fell in May 2021 on R hand  Pain 3rd MCP  Still \"not the same\"  No limitation in motion  Uncomfortable with hyperextenion      3. UTI  Issue with pool   Last in May 2021  In  - another UTI  Got Rx  Improved      4. Grand-daughter  Needed emergency surgery in [facility]               Review of Systems         Objective           Vitals:  No vitals were obtained today due to virtual visit.    Physical Exam   healthy, alert and no distress  PSYCH: Alert and oriented times 3; coherent speech, normal   rate and volume, able to articulate logical thoughts, able   to abstract reason, no tangential thoughts, no hallucinations   or delusions  Her affect is normal  RESP: No cough, no audible wheezing, able to talk in full sentences  Remainder of exam unable to be " completed due to telephone visits                Phone call duration: 21 minutes

## 2021-08-11 ENCOUNTER — TELEPHONE (OUTPATIENT)
Dept: FAMILY MEDICINE | Facility: CLINIC | Age: 66
End: 2021-08-11

## 2021-08-11 NOTE — TELEPHONE ENCOUNTER
[facility] phone call message- medication clarification/question:    Full Medication Name: oxyCODONE (ROXICODONE) 5 MG tablet   Dose: Route: Take 1 tablet (5 mg) by mouth every 4 hours as needed for moderate to severe pain - Oral    Question/Clarification needed: Pharmacists states when trying to fill the prescription an alert came up that states there was an allergy to Hydrocodone. What should she do next?     Reference number [id number]    Pharmacy confirmed as     [facility] - [address] [address]  Phone: [phone] Fax: [phone]  : Yes    Please leave ONLY preferred pharmacy    OK to leave a message on voice mail? Yes    Advised patient that RN would call back within 3 hours, unless emergent.    Primary language: English      needed? No    Call taken on August 11, 2021 at 1:31 PM by April Bivens    Route to [facility]

## 2021-08-12 NOTE — TELEPHONE ENCOUNTER
Yes I have seen that alert and talked to her about it  She tolerates the oxycodone without problems  PHarper

## 2021-08-12 NOTE — TELEPHONE ENCOUNTER
Rn contacted [facility] and relayed message below from Dr. Mcelroy- they verbalized understanding and will process order.   Yumiko Krishnan, RN

## 2021-09-28 DIAGNOSIS — G47.00 INSOMNIA, UNSPECIFIED TYPE: ICD-10-CM

## 2021-09-28 DIAGNOSIS — G89.4 CHRONIC PAIN SYNDROME: ICD-10-CM

## 2021-09-28 NOTE — TELEPHONE ENCOUNTER
Verify that the refill encounter hasn't been started Yes    [facility] phone call message- patient requesting a refill:    Full Medication Name: zolpidem (AMBIEN) 10 MG tablet  oxyCODONE (ROXICODONE) 5 MG tablet    Dose: see chart     Pharmacy confirmed as     [facility] - [place], [place] - [address] [place] [id number]  Phone: [phone] Fax: [phone]  : Yes    Medication tab checked to see if medication has been sent  Yes    Additional Comments:        OK to leave a message on voice mail? Yes    Advised patient refill may take up to 2 business days? Yes    Primary language: English      needed? No    Call taken on September 28, 2021 at 10:36 AM by Agustina Lerma to [facility]

## 2021-09-28 NOTE — TELEPHONE ENCOUNTER
"Request for medication refill:  Oxycodone and zolpidem (ambien)    Providers if patient needs an appointment and you are willing to give a one month supply please refill for one month and  send a letter/MyChart using \".SMILLIMITEDREFILL\" .smillimited and route chart to \"[facility] \" (Giving one month refill in non controlled medications is strongly recommended before denial)    If refill has been denied, meaning absolutely no refills without visit, please complete the smart phrase \".smirxrefuse\" and route it to the \"[facility]\"  pool to inform the patient and the pharmacy.    Tanna Marrero MA        "

## 2021-09-29 RX ORDER — ZOLPIDEM TARTRATE 10 MG/1
10 TABLET ORAL
Qty: 90 TABLET | Refills: 1 | Status: SHIPPED | OUTPATIENT
Start: 2021-09-29 | End: 2021-12-10

## 2021-09-29 RX ORDER — OXYCODONE HYDROCHLORIDE 5 MG/1
5 TABLET ORAL EVERY 4 HOURS PRN
Qty: 450 TABLET | Refills: 0 | Status: SHIPPED | OUTPATIENT
Start: 2021-09-29 | End: 2021-12-10

## 2021-10-02 ENCOUNTER — HEALTH MAINTENANCE LETTER (OUTPATIENT)
Age: 66
End: 2021-10-02

## 2021-12-09 DIAGNOSIS — G47.00 INSOMNIA, UNSPECIFIED TYPE: ICD-10-CM

## 2021-12-09 DIAGNOSIS — G89.4 CHRONIC PAIN SYNDROME: ICD-10-CM

## 2021-12-09 NOTE — TELEPHONE ENCOUNTER
Verify that the refill encounter hasn't been started Yes    [facility] phone call message- patient requesting a refill:    Full Medication Name: 1. oxyCODONE (ROXICODONE) 5 MG tablet 2. zolpidem (AMBIEN) 10 MG tablet    Dose: See chart     Pharmacy confirmed as   [facility] - [place], [place] - [address] [place] [id number]  Phone: [phone] Fax: [phone]  : Yes    Medication tab checked to see if medication has been sent  Yes    Additional Comments: Patient will be out soon. Please advise.      OK to leave a message on voice mail? Yes    Advised patient refill may take up to 2 business days? Yes    Primary language: English      needed? No    Call taken on December 9, 2021 at 12:52 PM by Trinh Lerma to [facility]

## 2021-12-09 NOTE — TELEPHONE ENCOUNTER

## 2021-12-10 RX ORDER — OXYCODONE HYDROCHLORIDE 5 MG/1
5 TABLET ORAL EVERY 4 HOURS PRN
Qty: 450 TABLET | Refills: 0 | Status: SHIPPED | OUTPATIENT
Start: 2021-12-10 | End: 2022-04-25

## 2021-12-10 RX ORDER — ZOLPIDEM TARTRATE 10 MG/1
10 TABLET ORAL
Qty: 90 TABLET | Refills: 1 | Status: SHIPPED | OUTPATIENT
Start: 2021-12-10 | End: 2022-05-05

## 2021-12-20 DIAGNOSIS — E78.5 HYPERLIPIDEMIA LDL GOAL <130: ICD-10-CM

## 2021-12-20 RX ORDER — SIMVASTATIN 20 MG
20 TABLET ORAL AT BEDTIME
Qty: 90 TABLET | Refills: 3 | Status: SHIPPED | OUTPATIENT
Start: 2021-12-20 | End: 2022-12-06

## 2021-12-20 NOTE — TELEPHONE ENCOUNTER
"Request for medication refill:  simvastatin (ZOCOR) 20 MG tablet    Providers if patient needs an appointment and you are willing to give a one month supply please refill for one month and  send a letter/MyChart using \".SMILLIMITEDREFILL\" .smillimited and route chart to \"[facility] \" (Giving one month refill in non controlled medications is strongly recommended before denial)    If refill has been denied, meaning absolutely no refills without visit, please complete the smart phrase \".smirxrefuse\" and route it to the \"[facility]\"  pool to inform the patient and the pharmacy.    Tanna Marrero MA        "

## 2022-01-03 ENCOUNTER — VIRTUAL VISIT (OUTPATIENT)
Dept: FAMILY MEDICINE | Facility: CLINIC | Age: 67
End: 2022-01-03
Payer: COMMERCIAL

## 2022-01-03 DIAGNOSIS — G47.00 INSOMNIA, UNSPECIFIED TYPE: ICD-10-CM

## 2022-01-03 DIAGNOSIS — G90.522 COMPLEX REGIONAL PAIN SYNDROME TYPE 1 OF LEFT LOWER EXTREMITY: ICD-10-CM

## 2022-01-03 DIAGNOSIS — G89.4 CHRONIC PAIN SYNDROME: Primary | ICD-10-CM

## 2022-01-03 DIAGNOSIS — B00.9 HERPES SIMPLEX VIRUS INFECTION: ICD-10-CM

## 2022-01-03 PROCEDURE — 99214 OFFICE O/P EST MOD 30 MIN: CPT | Mod: 95 | Performed by: FAMILY MEDICINE

## 2022-01-03 RX ORDER — GABAPENTIN 600 MG/1
1200 TABLET ORAL 3 TIMES DAILY
Qty: 540 TABLET | Refills: 3 | Status: SHIPPED | OUTPATIENT
Start: 2022-01-03 | End: 2022-12-26

## 2022-01-03 RX ORDER — VALACYCLOVIR HYDROCHLORIDE 500 MG/1
500 TABLET, FILM COATED ORAL DAILY
Qty: 180 TABLET | Refills: 3 | Status: SHIPPED | OUTPATIENT
Start: 2022-01-03 | End: 2022-12-26

## 2022-01-03 ASSESSMENT — PATIENT HEALTH QUESTIONNAIRE - PHQ9: SUM OF ALL RESPONSES TO PHQ QUESTIONS 1-9: 0

## 2022-01-03 NOTE — PROGRESS NOTES
Haiele is a 66 year old who is being evaluated via a billable telephone visit.          Assessment & Plan     Chronic pain syndrome  Utox - not done (virtual visit)   - expected  FAQ - 55  Care Plan Date: May/2021  Current morphine equivalents/day = 37.5 mg   Naloxone - Yes    Offered referral to Pain Clinic for new options. She is content with present course      Complex regional pain syndrome type 1 of left lower extremity  Refill  - gabapentin (NEURONTIN) 600 MG tablet; Take 2 tablets (1,200 mg) by mouth 3 times daily    Herpes simplex  refill  - valACYclovir (VALTREX) 500 MG tablet; Take 1 tablet (500 mg) by mouth daily    Insomnia, unspecified type  Continues daily use  This will be difficult to change    RTC 2mo   New Consent  Review Personal Care Plan                  Kaleb Mcelroy MD  [facility]    Ashley Stephen is a 66 year old who presents for the following health issues - Chronic pain    HPI     2 grand-daughters  Dominga - 24yo  Sister - 18yo    Lalit/Nancy in school    Chronic pain  Discussed investigating new options    Pool - frequently - M-F  Wt - same 170  Sleep - good with pills    FAQ5 - 55            Review of Systems         Objective           Vitals:  No vitals were obtained today due to virtual visit.    Physical Exam   healthy, alert and no distress  PSYCH: Alert and oriented times 3; coherent speech, normal   rate and volume, able to articulate logical thoughts, able   to abstract reason, no tangential thoughts, no hallucinations   or delusions  Her affect is normal  RESP: No cough, no audible wheezing, able to talk in full sentences  Remainder of exam unable to be completed due to telephone visits                  Start - 11:05 AM  End - 11:23 AM    Phone call duration: 18 minutes

## 2022-03-19 ENCOUNTER — HEALTH MAINTENANCE LETTER (OUTPATIENT)
Age: 67
End: 2022-03-19

## 2022-04-25 ENCOUNTER — VIRTUAL VISIT (OUTPATIENT)
Dept: FAMILY MEDICINE | Facility: CLINIC | Age: 67
End: 2022-04-25
Payer: COMMERCIAL

## 2022-04-25 DIAGNOSIS — G90.522 COMPLEX REGIONAL PAIN SYNDROME TYPE 1 OF LEFT LOWER EXTREMITY: ICD-10-CM

## 2022-04-25 DIAGNOSIS — G89.4 CHRONIC PAIN SYNDROME: ICD-10-CM

## 2022-04-25 DIAGNOSIS — F11.90 CHRONIC, CONTINUOUS USE OF OPIOIDS: Primary | ICD-10-CM

## 2022-04-25 PROCEDURE — 99213 OFFICE O/P EST LOW 20 MIN: CPT | Mod: 95 | Performed by: FAMILY MEDICINE

## 2022-04-25 RX ORDER — OXYCODONE HYDROCHLORIDE 5 MG/1
5 TABLET ORAL EVERY 4 HOURS PRN
Qty: 450 TABLET | Refills: 0 | Status: SHIPPED | OUTPATIENT
Start: 2022-04-25 | End: 2022-05-05

## 2022-04-25 NOTE — PROGRESS NOTES
Hailee is a 66 year old who is being evaluated via a billable telephone visit.      What phone number would you like to be contacted at? home  How would you like to obtain your AVS? Delmishart    Assessment & Plan     Chronic, continuous use of opioids  Discussed this with patient.  Says she has been on a stable dose for years and follows all instructions.  Says she has not be able to come in to be seen in person and do a UDS due to her  having cardiac issues and being very wary of covid 19.  Discussed options and stated that we should get a UDS at the very least yearly and preferably every 3 months.  She says she will try to do this at a [facility] and a future order was placed.   was appropriate and patient stated she had no concerns with the medication.  Also does take zolpidem and gabapentin which may raise her risk of opioid complications slightly.  Patient states she will follow up with her primary doctor, Dr. Mcelroy, within the next 3 months but was running out of medication and wouldn't have been able to be seen in time    Complex regional pain syndrome type 1 of left lower extremity  As above  - Urine Drugs of Abuse Screen Panel 13; Future    Chronic pain syndrome   As above  - Urine Drugs of Abuse Screen Panel 13; Future  - oxyCODONE (ROXICODONE) 5 MG tablet; Take 1 tablet (5 mg) by mouth every 4 hours as needed for moderate to severe pain    Chronic pain syndrome  As above  - Urine Drugs of Abuse Screen Panel 13; Future  - oxyCODONE (ROXICODONE) 5 MG tablet; Take 1 tablet (5 mg) by mouth every 4 hours as needed for moderate to severe pain    >20 minutes spent talking to this patient and then talking with her PCP to update him on the plan.    No follow-ups on file.    Giorgi Doyle MD  [facility] JHONATAN Stephen is a 66 year old who presents for the following health issues     HPI   See above          Objective           Vitals:  No vitals were obtained today  due to virtual visit.    Physical Exam   healthy, alert and no distress  PSYCH: Alert and oriented times 3; coherent speech, normal   rate and volume, able to articulate logical thoughts, able   to abstract reason, no tangential thoughts, no hallucinations   or delusions  Her affect is normal  RESP: No cough, no audible wheezing, able to talk in full sentences  Remainder of exam unable to be completed due to telephone visits        Phone call duration: 15 minutes

## 2022-04-27 ENCOUNTER — LAB (OUTPATIENT)
Dept: LAB | Facility: CLINIC | Age: 67
End: 2022-04-27
Payer: COMMERCIAL

## 2022-04-27 DIAGNOSIS — G89.4 CHRONIC PAIN SYNDROME: ICD-10-CM

## 2022-04-27 DIAGNOSIS — G90.522 COMPLEX REGIONAL PAIN SYNDROME TYPE 1 OF LEFT LOWER EXTREMITY: ICD-10-CM

## 2022-04-27 LAB
AMPHETAMINES UR QL: NOT DETECTED
BARBITURATES UR QL SCN: NOT DETECTED
BENZODIAZ UR QL SCN: NOT DETECTED
BUPRENORPHINE UR QL: NOT DETECTED
CANNABINOIDS UR QL: NOT DETECTED
COCAINE UR QL SCN: NOT DETECTED
D-METHAMPHET UR QL: NOT DETECTED
METHADONE UR QL SCN: NOT DETECTED
OPIATES UR QL SCN: NOT DETECTED
OXYCODONE UR QL SCN: DETECTED
PCP UR QL SCN: NOT DETECTED
PROPOXYPH UR QL: NOT DETECTED
TRICYCLICS UR QL SCN: NOT DETECTED

## 2022-04-27 PROCEDURE — 80306 DRUG TEST PRSMV INSTRMNT: CPT

## 2022-04-28 DIAGNOSIS — G47.00 INSOMNIA, UNSPECIFIED TYPE: ICD-10-CM

## 2022-04-28 NOTE — TELEPHONE ENCOUNTER
[facility] phone call message- patient requesting a refill:    Full Medication Name:   zolpidem (AMBIEN) 10 MG tablet 90 tablet       Pharmacy confirmed as   [facility] - [place], [place] - [address] [place] [id number]  Phone: [phone] Fax: [phone]  : Yes    Additional Comments: Patient called and said that her pharmacy did not receive her electronic prescription for this medication and she is completely out and needs a refill. Call back number is [phone]     OK to leave a message on voice mail? Yes    Primary language: English      needed? No    Call taken on April 28, 2022 at 9:59 AM by Shahida Fernando

## 2022-04-28 NOTE — TELEPHONE ENCOUNTER
Pt should have refill on file from rx we sent in 12/10/2021. [facility] stated [facility] rx on 4/26. RN called pt to relay     Shilpi Tong RN

## 2022-05-05 DIAGNOSIS — G90.522 COMPLEX REGIONAL PAIN SYNDROME TYPE 1 OF LEFT LOWER EXTREMITY: ICD-10-CM

## 2022-05-05 DIAGNOSIS — F11.90 CHRONIC, CONTINUOUS USE OF OPIOIDS: ICD-10-CM

## 2022-05-05 DIAGNOSIS — G89.4 CHRONIC PAIN SYNDROME: Primary | ICD-10-CM

## 2022-05-05 RX ORDER — OXYCODONE HYDROCHLORIDE 5 MG/1
5 TABLET ORAL EVERY 4 HOURS PRN
Qty: 340 TABLET | Refills: 0 | Status: SHIPPED | OUTPATIENT
Start: 2022-05-05 | End: 2022-07-05

## 2022-05-05 RX ORDER — ZOLPIDEM TARTRATE 10 MG/1
10 TABLET ORAL
Qty: 90 TABLET | Refills: 1 | Status: SHIPPED | OUTPATIENT
Start: 2022-05-05 | End: 2022-10-31

## 2022-05-05 NOTE — PROGRESS NOTES
Called patient    Received 8 days oxycodone (#42 tabs)  New policy. Will dispense only 68 days   New rx - 68days x 5pills/d = 340 pills  New Rx sent  PHarper

## 2022-05-05 NOTE — TELEPHONE ENCOUNTER
"Patient called to say \"there is a new rule and they will only refill this medication for 68 days\"  Patient reports she needs a new RX sent to [facility] that is for 340 tablets which is a 68 day supply. She will then have enough to last until July 12th.  Any further questions call patient at [phone]  "

## 2022-05-16 ENCOUNTER — VIRTUAL VISIT (OUTPATIENT)
Dept: FAMILY MEDICINE | Facility: CLINIC | Age: 67
End: 2022-05-16
Payer: COMMERCIAL

## 2022-05-16 DIAGNOSIS — M79.674 PAIN OF TOE OF RIGHT FOOT: Primary | ICD-10-CM

## 2022-05-16 PROCEDURE — 99213 OFFICE O/P EST LOW 20 MIN: CPT | Mod: 95 | Performed by: STUDENT IN AN ORGANIZED HEALTH CARE EDUCATION/TRAINING PROGRAM

## 2022-05-16 NOTE — PROGRESS NOTES
"Hailee is a 66 year old who is being evaluated via a billable telephone visit.      What phone number would you like to be contacted at? [phone]   How would you like to obtain your AVS? MyChart    Assessment & Plan     Pain of toe of right foot  Differential includes tendonpathy vs stress fractures vs presentation of her CRPS considering history. Difficult to fully ascertain what could be going on without a physical exam.     There is no injury / trauma, and tho she cannot bear weight on this foot, she is also stating she has not noted any color changes or swelling in this R toe. Together this is less concerning for an emergent condition such as an acute deforming fracture or compressions or vascular compromise necessitating an ER visit, but she should be seen in clinic soon for a physical exam of her foot to better figure out what could be going on.     For now, we shall try the following therapies as she gets in with us  - Alternate between acetaminophen and ibuprofen Q4hrs  - Use alternating ice and heat as needed for inflammation  - Try to reduce usage of the R foot until we see her - she has crutches and boot          BMI:   Estimated body mass index is 29.18 kg/m  as calculated from the following:    Height as of 6/22/21: 1.626 m (5' 4\").    Weight as of 6/22/21: 77.1 kg (170 lb).     No follow-ups on file.    DO MATTHEW Eisenberg [facility] JHONATAN    Ashley Stephen is a 66 year old who presents for the following health issues     HPI   Foot Pain     3x day hx of R first metatarsal pain on the medial side    Vacuuming on Saturday but reports no injury / over-extension / over-flexion traumas    Hx of bunionectomy on this side    No pain w/ toe flexion, but reports pain w/ toe extension    Cannot bear weight onto affected metatarsel - is walking around on the lateral side of her foot    No swelling / redness / obvious deformities    Hx of CRPS, Hx of B/L Plantarfasciatis , Hx of B/L foot " tendonopathy       Review of Systems   Constitutional, HEENT, cardiovascular, pulmonary, gi and gu systems are negative, except as otherwise noted.      Objective           Vitals:  No vitals were obtained today due to virtual visit.    Physical Exam   alert and no distress  PSYCH: Alert and oriented times 3; coherent speech, normal   rate and volume, able to articulate logical thoughts, able   to abstract reason, no tangential thoughts, no hallucinations   or delusions  Her affect is normal  RESP: No cough, no audible wheezing, able to talk in full sentences  Remainder of exam unable to be completed due to telephone visits    ----- Service Performed and Documented by Resident or Fellow ------        Phone call duration: 15 minutes

## 2022-05-16 NOTE — PROGRESS NOTES
Preceptor Attestation:   I discussed the patient with the resident. I talked to the patient on the phone. I have verified the content of the note, which accurately reflects my assessment of the patient and the plan of care.   Supervising Physician:  Tanna Lockwood MD.

## 2022-07-05 ENCOUNTER — VIRTUAL VISIT (OUTPATIENT)
Dept: FAMILY MEDICINE | Facility: CLINIC | Age: 67
End: 2022-07-05
Payer: COMMERCIAL

## 2022-07-05 DIAGNOSIS — G89.4 CHRONIC PAIN SYNDROME: ICD-10-CM

## 2022-07-05 DIAGNOSIS — G90.522 COMPLEX REGIONAL PAIN SYNDROME TYPE 1 OF LEFT LOWER EXTREMITY: ICD-10-CM

## 2022-07-05 DIAGNOSIS — F11.90 CHRONIC, CONTINUOUS USE OF OPIOIDS: ICD-10-CM

## 2022-07-05 PROCEDURE — 99214 OFFICE O/P EST MOD 30 MIN: CPT | Mod: 95 | Performed by: FAMILY MEDICINE

## 2022-07-05 RX ORDER — OXYCODONE HYDROCHLORIDE 5 MG/1
5 TABLET ORAL EVERY 4 HOURS PRN
Qty: 340 TABLET | Refills: 0 | Status: SHIPPED | OUTPATIENT
Start: 2022-07-05 | End: 2022-08-29

## 2022-07-05 ASSESSMENT — ANXIETY QUESTIONNAIRES
6. BECOMING EASILY ANNOYED OR IRRITABLE: SEVERAL DAYS
GAD7 TOTAL SCORE: 1
5. BEING SO RESTLESS THAT IT IS HARD TO SIT STILL: NOT AT ALL
IF YOU CHECKED OFF ANY PROBLEMS ON THIS QUESTIONNAIRE, HOW DIFFICULT HAVE THESE PROBLEMS MADE IT FOR YOU TO DO YOUR WORK, TAKE CARE OF THINGS AT HOME, OR GET ALONG WITH OTHER PEOPLE: NOT DIFFICULT AT ALL
4. TROUBLE RELAXING: NOT AT ALL
3. WORRYING TOO MUCH ABOUT DIFFERENT THINGS: NOT AT ALL
2. NOT BEING ABLE TO STOP OR CONTROL WORRYING: NOT AT ALL
7. FEELING AFRAID AS IF SOMETHING AWFUL MIGHT HAPPEN: NOT AT ALL
1. FEELING NERVOUS, ANXIOUS, OR ON EDGE: NOT AT ALL
GAD7 TOTAL SCORE: 1

## 2022-07-05 NOTE — PATIENT INSTRUCTIONS
Hailee Mayo  [id number]         July 5, 2022  Informed  Consent  and  Agreement  for  Opioid  Therapy  of  Pain        Reason  for  Review  and  Signing  of  this  Document       Pain  relief  is  an  important  goal  for  your  care.  Opioid  medications  may  be  a  helpful  part  of   chronic  pain  treatment  for  some  people;  however,  misuse  of  opioid  medications  may result  in   serious  harm  to  patients  prescribed  them  and,  when  the  medications  are  diverted,  to  the  public  at   large.  As  opioid  use  for  pain  management  has  increased  in  recent  years,  injury,  addiction,  and   death  due  to  misuse  of  opioids  have  also  increased.    Patients  and  health  care  providers  both  have  responsibilities  for  the  safe  use  of  opioid   medications  when  they  are  prescribed  for  pain.  This  agreement  provides  important information   on  the  potential  benefits  and  risks  of  opioid  medications  and shows that both  you and  your  provider  agree  on  a  care  plan  so  that  opioid  medications  are  used  in  a  way  that  is  safe   and  effective  in  treating  your  pain.    This  agreement  is  reviewed  and  signed  by  all  patients  in  our   practice  who  receive  opioids  for  chronic  pain.          Expected  Benefits  or  Goals  of  Opioid  Treatment    Improved  pain     Improved  ability  to  engage  in  work,  social,  recreational  and/or  physical  activities     Improved  quality  of  life            Potential Risks or Side Effects of Opioid Treatment   Overdose Taking more than the prescribed amount of medication or using with alcohol or other drugs can cause you to stop breathing, resulting in coma, brain damage, or even death. Every single day, more than 40 Americans die from prescription opioid overdoses. The Center for Disease Control and Prevention (CDC) has declared that the [place] is in the middle of an epidemic opioid overdose  deaths.     Physical side effects May include mood changes, drowsiness, nausea, constipation, urination difficulties, depressed breathing, itching, bone thinning and sexual difficulties, such as lowering male hormone in men and stopping menstrual periods in women.     Physical dependence Suddenly stopping an opioid may lead to withdrawal symptoms including abdominal cramping, pain, diarrhea, sweating, anxiety, irritability and aching.    Tolerance A dose of an opioid may become less effective over time even though there is no change in your physical condition. If this happens repeatedly, your medication may need to be changed or discontinued.     Addiction Is more common in people with personal or family history of addiction, but can occur in anyone. It is suggested by drug craving, loss of control and may lead to money, relationship, or legal problems.    Hyperalgesia The use of opioids can increase the experience of pain. If this happens, it may require change or discontinuation of medication.     Sleep apnea  (periods of not breathing while asleep) may be caused or worsened by opioids.  Risk to unborn child Risks to unborn children may include: physical dependence at birth, possible alterations in pain perception, possible increased risk for addiction later in life, among others. Tell your provider if you are or intend to become pregnant.      Victimization There is a risk that you or someone in your household may be the victim of theft, deceit, assault or abuse by people trying to take your medications to misuse them.          Responsibilities  in  Opioid  Therapy  of  Chronic  Pain      Your provider's responsibilities: listening carefully to your concerns, treating you with  care and with due respect, and making clinical decisions based on what he/she believes is in  your best interest.    Your responsibilities: In order to maximize the potential benefits of opioid medications and to  minimize the potential  risks, it is important that you accept the following responsibilities. In  signing this agreement, you agree to:    1. Use your opioid medications as prescribed for the purpose of relieving pain  2. Keep your medications locked up to avoid intentional or unintentional use or diversion  by others. Discard all unused medications.  3. Be honest with your providers about your medication or other drug use.  4. Use no illegal drugs and not abuse alcohol while being prescribed opioids.  5. Not to share, sell, trade or in any way provide your medications to others.  6. Receive opioid medications from this practice only. If opioids are prescribed  unexpectedly by another office (for example due to an accident or dental procedure),  inform this office within 24 hours.  7. Fill your opioid medications at one pharmacy only. Inform this practice within 24 hours  if you must use a pharmacy different from your usual one.  8. Have urine drugs tests on a random basis and as requested by your provider. (Opioid  may be discontinued if the tests are declined illicit drugs found or medication not present when should be.)  9. Bring your opioid medications to the practice when requested.  10. Participate in other pain treatments agreed to with your provider and keep all  appointments scheduled for your care.  11. Permit this practice to communicate with other care providers and/or your significant  others as needed to assure opioids are being used appropriately and are beneficial to  your health and well-being  12.  I understand that my clinic can track controlled substance prescriptions from other providers through a central database (prescription monitoring program).  13.   I will tell my clinic right away if I become pregnant or have a new medical problem treated at another clinic    Your medications may be continued if they improve your pain, help you engage in valued  activities, and/or enhance your quality of life and if you follow  the above responsibilities.  Your medications may be discontinued if your goals for treatment are not met, if you experience negative  effects from using them, or if you do not follow this agreement.  If you develop complications of opioid use, such as addiction, we will assist you in finding  treatment. Please be aware, however, that our practice cooperates fully with law enforcement,  the [place] Drug Enforcement Agency and other agencies in the investigation of opioid-related  crimes including sharing, selling, trading or other potential harmful use of these powerful  Medications.    I have reviewed this document and been given the opportunity to have any questions  answered. I understand the possible benefits and risks of opioid medications and I accept the  responsibilities described above.    __________________________________         ____________________________________  Patient     Date   Kaleb Mcelroy MD                     Date           Personal Care Plan for Chronic Pain  Reviewed 4/13/2020     1.  Personal Goals:  Engaging and spending time with my grandkids as much as I can, getting to my water aerobics and spending that time with the others int he class and my mom, spending time with my mom and my sister, feel like I am still of use in this world, that I am important to my grandma, a good partner to my , to stay in my house and keep it clean        2.  Sleep:                          *  Basic sleep plan:                                              *reduce or eliminate caffeine and daytime naps                                              * relaxation before bed                                              * limit screen time 1-2 hours prior to bed                                              * establish dark/quiet sleep environment                        *  Nighttime medications including the following: zolpidem     3.  Physical Activity:                          * Home/community based activity:                                               * Yoga dvd 1x/week                                              * water aerobics 3-4x/week:                          * Listen to your body.  Pace yourself for success.  Don't over-do it.  Don't under do it.                        * Balance activities with rest and set realistic goals.      4.  Nutrition/Weight:                          * Try to eat at least 5 servings of fresh fruits and vegetables each day.                        * Limit processed foods and foods high in sugar, sodium and fat.                        * Maintain a healthy weight.      5.  Mood/Stress Management:                         * Listening to healing cd for relaxation and pain management - can use for acute pain, but it's also helpful to do this daily to help retrain brain                        * Listening [place] music                        * Taking sertraline medication daily     6.  Pain:                          * Non-medication treatments:                                              * light massage as tolerated     7.  Pain Medications: oxycodone as prescribed                 JEAN CLAUDE Villarreal, GORAN Rodriguez, MANOJ Parrish, DENYS Acosta., JIMY New., & Shaniqua, CASSY.E. Cognitive behavioral therapy for chronic pain among veterans: Therapist manual. [place], [place]: [employer].          JEAN CLAUDE Villarreal, GORAN Rodriguez, TYLER Parrish., DENYS Acosta., Shaq RBETTYE., & Shaniqua, B.E. Cognitive behavioral therapy for chronic pain among veterans: Therapist manual. [place], [place]: [employer].      Per patient request, Opioid contract sent to patient home. Patient to sign, date and return to clinic.

## 2022-07-05 NOTE — PROGRESS NOTES
Hailee is a 66 year old who is being evaluated via a billable telephone visit.      Assessment & Plan     Chronic pain syndrome  Chronic, continuous use of opioids  Complex regional pain syndrome type 1 of left lower extremity  Stable  Sent treatment agreement and personal care plan in mail for her to review  Pharm plan allowing only 68 days of pills per Rx (68x5=340)    Utox - not done d/t phone visit    - expected   FAQ - 60   PEG - 4 (7.5.22)  Care Plan Date: July 2022 reviewed.   Consent - July 2022 sent in mail  Current morphine equivalents/day = 37.5 mg   Naloxone - Yes    - oxyCODONE (ROXICODONE) 5 MG tablet; Take 1 tablet (5 mg) by mouth every 4 hours as needed for pain or moderate to severe pain (for 68 days per [facility])             Kaleb Mcelroy MD  [facility]   Hailee is a 66 year old, presenting for the following health issues:      HPI   1. Chronic Pain  GAD7  PEG  Care plan  Consent    2. R foot pain   Improved  Thought initially to be worse CRPS        Pain History:  When did you first notice your pain? - Chronic Pain   Have you seen this provider for your pain in the past?   Yes   Where in your body do you have pain?   Are you seeing anyone else for your pain? No    PHQ-9 SCORE 2/5/2018 4/13/2020 1/3/2022   PHQ-9 Total Score - - -   PHQ-9 Total Score 3 3 0             EDUAR-7 SCORE 4/13/2020 5/25/2021 7/5/2022   Total Score - - -   Total Score 1 0 1     PEG Score 7/5/2022   PEG Total Score 4       Chronic Pain Follow Up:    PDMP Review       Value Time User    State PDMP site checked  Yes 7/5/2022 10:36 AM Kaleb Mcelroy MD        Last CSA Agreement:   CSA -- Patient Level:    Controlled Substance Agreement - Opioid - Scan on 6/4/2021 12:34 PM  New agreement sent 7/5/22 for her to review       Last UDS: 4/27/2022        Review of Systems         Objective           Vitals:  No vitals were obtained today due to virtual visit.    Physical Exam   healthy,  alert and no distress  PSYCH: Alert and oriented times 3; coherent speech, normal   rate and volume, able to articulate logical thoughts, able   to abstract reason, no tangential thoughts, no hallucinations   or delusions  Her affect is normal  RESP: No cough, no audible wheezing, able to talk in full sentences  Remainder of exam unable to be completed due to telephone visits      Start: 10:27 AM  End: 10:50 AM    Phone call duration: 23 minutes    .  ..

## 2022-08-29 ENCOUNTER — VIRTUAL VISIT (OUTPATIENT)
Dept: FAMILY MEDICINE | Facility: CLINIC | Age: 67
End: 2022-08-29
Payer: COMMERCIAL

## 2022-08-29 DIAGNOSIS — G90.522 COMPLEX REGIONAL PAIN SYNDROME TYPE 1 OF LEFT LOWER EXTREMITY: ICD-10-CM

## 2022-08-29 DIAGNOSIS — F11.90 CHRONIC, CONTINUOUS USE OF OPIOIDS: ICD-10-CM

## 2022-08-29 DIAGNOSIS — G89.4 CHRONIC PAIN SYNDROME: ICD-10-CM

## 2022-08-29 PROCEDURE — 99214 OFFICE O/P EST MOD 30 MIN: CPT | Mod: 95 | Performed by: FAMILY MEDICINE

## 2022-08-29 RX ORDER — OXYCODONE HYDROCHLORIDE 5 MG/1
5 TABLET ORAL EVERY 4 HOURS PRN
Qty: 340 TABLET | Refills: 0 | Status: SHIPPED | OUTPATIENT
Start: 2022-08-29 | End: 2022-10-31

## 2022-08-29 NOTE — Clinical Note
This patient needs to sign a new opioid prescribing agreement, but she is not able to access [employer] and had a virtual visit. Can you print the patient instructions from 7/5/22 and mail to her home? Thanks!! -Dr Hebert

## 2022-08-29 NOTE — PROGRESS NOTES
"Family Medicine Telephone Visit Note    Chief Complaint   Patient presents with     Medication Refill     Patient requesting refill of Oxycodone and any other refills needed.              HPI   Patients name: Hailee  Appointment start time:  10:37 AM    Pt presents for refill of chronic opioid prescription.    Current Outpatient Medications   Medication Sig Dispense Refill     gabapentin (NEURONTIN) 600 MG tablet Take 2 tablets (1,200 mg) by mouth 3 times daily 540 tablet 3     Multiple Vitamins-Minerals (MULTIVITAMIN ADULT PO) Take by mouth daily       naloxone (NARCAN) nasal spray Spray 1 spray (4 mg) into one nostril alternating nostrils as needed for opioid reversal (every 2-3 minutes until assistance arrives.) 0.2 mL 1     oxyCODONE (ROXICODONE) 5 MG tablet Take 1 tablet (5 mg) by mouth every 4 hours as needed for pain or moderate to severe pain (for 68 days per [employer]Scripts) 340 tablet 0     Probiotic Product (PROBIOTIC DAILY PO) Take by mouth daily       SHINGRIX injection TO BE ADMINISTERED BY PHARMACIST FOR IMMUNIZATION  0     simvastatin (ZOCOR) 20 MG tablet Take 1 tablet (20 mg) by mouth At Bedtime 90 tablet 3     valACYclovir (VALTREX) 500 MG tablet Take 1 tablet (500 mg) by mouth daily 180 tablet 3     zolpidem (AMBIEN) 10 MG tablet Take 1 tablet (10 mg) by mouth nightly as needed for sleep 90 tablet 1     Black Pepper-Turmeric 3-500 MG CAPS  (Patient not taking: No sig reported)       Allergies   Allergen Reactions     Penicillins Anaphylaxis     Swelling. Tolerating ceftriaxone       Hydrocodone      Vicodin [Hydrocodone-Acetaminophen] Other (See Comments)     Burning in abd              Review of Systems:     Constitutional, HEENT, cardiovascular, pulmonary, gi and gu systems are negative, except as otherwise noted.         Physical Exam:     There were no vitals taken for this visit.  Estimated body mass index is 29.18 kg/m  as calculated from the following:    Height as of 6/22/21: 1.626 m (5' 4\").    " Weight as of 21: 77.1 kg (170 lb).    Exam:  Constitutional: healthy, alert and no distress  Psychiatric: mentation appears normal and affect normal/bright          Assessment and Plan       ICD-10-CM    1. Chronic pain syndrome  G89.4 oxyCODONE (ROXICODONE) 5 MG tablet   2. Chronic, continuous use of opioids  F11.90 oxyCODONE (ROXICODONE) 5 MG tablet   3. Complex regional pain syndrome type 1 of left lower extremity  G90.522 oxyCODONE (ROXICODONE) 5 MG tablet     Stable  Pain contract , patient requests it be sent by mail  Pharm plan allowing only 68 days of pills per Rx (68x5=340)     Utox - not done d/t phone visit, last completed in April. Appropriate for annual UDS.    - expected   Current morphine equivalents/day = 37.5 mg   Naloxone - Yes    Discussed scheduling in person AWV, patient agreeable to do so in the Spring to avoid winter driving.    After Visit Information:  Patient declined AVS     No follow-ups on file.    Appointment end time: 10:45 AM  This is a telephone visit that took 8 minutes.      Clinician location:  [facility] JHONATAN Hebert DO

## 2022-10-31 ENCOUNTER — VIRTUAL VISIT (OUTPATIENT)
Dept: FAMILY MEDICINE | Facility: CLINIC | Age: 67
End: 2022-10-31
Payer: COMMERCIAL

## 2022-10-31 DIAGNOSIS — G47.00 INSOMNIA, UNSPECIFIED TYPE: ICD-10-CM

## 2022-10-31 DIAGNOSIS — G89.4 CHRONIC PAIN SYNDROME: Primary | ICD-10-CM

## 2022-10-31 DIAGNOSIS — G90.522 COMPLEX REGIONAL PAIN SYNDROME TYPE 1 OF LEFT LOWER EXTREMITY: ICD-10-CM

## 2022-10-31 DIAGNOSIS — F11.90 CHRONIC, CONTINUOUS USE OF OPIOIDS: ICD-10-CM

## 2022-10-31 DIAGNOSIS — Z12.31 ENCOUNTER FOR SCREENING MAMMOGRAM FOR MALIGNANT NEOPLASM OF BREAST: ICD-10-CM

## 2022-10-31 PROCEDURE — 99214 OFFICE O/P EST MOD 30 MIN: CPT | Mod: 95 | Performed by: FAMILY MEDICINE

## 2022-10-31 RX ORDER — OXYCODONE HYDROCHLORIDE 5 MG/1
5 TABLET ORAL EVERY 4 HOURS PRN
Qty: 340 TABLET | Refills: 0 | Status: SHIPPED | OUTPATIENT
Start: 2022-10-31 | End: 2022-12-26

## 2022-10-31 RX ORDER — ZOLPIDEM TARTRATE 10 MG/1
10 TABLET ORAL
Qty: 90 TABLET | Refills: 1 | Status: SHIPPED | OUTPATIENT
Start: 2022-10-31 | End: 2023-04-28

## 2022-10-31 NOTE — LETTER
Opioid / Opioid Plus Controlled Substance Agreement    This is an agreement between you and your provider about the safe and appropriate use of controlled substance/opioids prescribed by your care team. Controlled substances are medicines that can cause physical and mental dependence (abuse).    There are strict laws about having and using these medicines. We here at [facility] are committing to working with you in your efforts to get better. To support you in this work, we ll help you schedule regular office appointments for medicine refills. If we must cancel or change your appointment for any reason, we ll make sure you have enough medicine to last until your next appointment.     As a Provider, I will:    Listen carefully to your concerns and treat you with respect.     Recommend a treatment plan that I believe is in your best interest. This plan may involve therapies other than opioid pain medication.     Talk with you often about the possible benefits, and the risk of harm of any medicine that we prescribe for you.     Provide a plan on how to taper (discontinue or go off) using this medicine if the decision is made to stop its use.    As a Patient, I understand that opioid(s):     Are a controlled substance prescribed by my care team to help me function or work and manage my condition(s).     Are strong medicines and can cause serious side effects such as:    Drowsiness, which can seriously affect my driving ability    A lower breathing rate, enough to cause death    Harm to my thinking ability     Depression     Abuse of and addiction to this medicine    Need to be taken exactly as prescribed. Combining opioids with certain medicines or chemicals (such as illegal drugs, sedatives, sleeping pills, and benzodiazepines) can be dangerous or even fatal. If I stop opioids suddenly, I may have severe withdrawal symptoms.    Do not work for all types of pain nor for all patients. If they re not helpful, I may  be asked to stop them.        The risks, benefits and side effects of these medicine(s) were explained to me. I agree that:  1. I will take part in other treatments as advised by my care team. This may be psychiatry or counseling, physical therapy, behavioral therapy, group treatment or a referral to a specialist.     2. I will keep all my appointments. I understand that this is part of the monitoring of opioids. My care team may require an office visit for EVERY opioid/controlled substance refill. If I miss appointments or don t follow instructions, my care team may stop my medicine.    3. I will take my medicines as prescribed. I will not change the dose or schedule unless my care team tells me to. There will be no refills if I run out early.     4. I may be asked to come to the clinic and complete a urine drug test or complete a pill count at any time. If I don t give a urine sample or participate in a pill count, the care team may stop my medicine.    5. I will only receive prescriptions from this clinic for chronic pain. If I am treated by another provider for acute pain issues, I will tell them that I am taking opioid pain medication for chronic pain and that I have a treatment agreement with this provider. I will inform my [facility] care team within one business day if I am given a prescription for any pain medication by another healthcare provider. My [facility] care team can contact other providers and pharmacists about my use of any medicines.    6. It is up to me to make sure that I don t run out of my medicines on weekends or holidays. If my care team is willing to refill my opioid prescription without a visit, I must request refills only during office hours. Refills may take up to 3 business days to process. I will use one pharmacy to fill all my opioid and other controlled substance prescriptions. I will notify the clinic about any changes to my insurance or medication  availability.    7. I am responsible for my prescriptions. If the medicine/prescription is lost, stolen or destroyed, it will not be replaced. I also agree not to share controlled substance medicines with anyone.    8. I am aware I should not use any illegal or recreational drugs. I agree not to drink alcohol unless my care team says I can.       9. If I enroll in the [place] [facility] program, I will tell my care team prior to my next refill.     10. I will tell my care team right away if I become pregnant, have a new medical problem treated outside of my regular clinic, or have a change in my medications.    11. I understand that this medicine can affect my thinking, judgment and reaction time. Alcohol and drugs affect the brain and body, which can affect the safety of my driving. Being under the influence of alcohol or drugs can affect my decision-making, behaviors, personal safety, and the safety of others. Driving while impaired (DWI) can occur if a person is driving, operating, or in physical control of a car, motorcycle, boat, snowmobile, ATV, motorbike, off-road vehicle, or any other motor vehicle ([place] Statute 169A.20). I understand the risk if I choose to drive or operate any vehicle or machinery.    I understand that if I do not follow any of the conditions above, my prescriptions or treatment may be stopped or changed.          Opioids  What You Need to Know    What are opioids?   Opioids are pain medicines that must be prescribed by a doctor. They are also known as narcotics.     Examples are:   1. morphine (MS Contin, Jennyfer)  2. oxycodone (Oxycontin)  3. oxycodone and acetaminophen (Percocet)  4. hydrocodone and acetaminophen (Vicodin, Norco)   5. fentanyl patch (Duragesic)   6. hydromorphone (Dilaudid)   7. methadone  8. codeine (Tylenol #3)     What do opioids do well?   Opioids are best for severe short-term pain such as after a surgery or injury. They may work well for cancer pain. They may  help some people with long-lasting (chronic) pain.     What do opioids NOT do well?   Opioids never get rid of pain entirely, and they don t work well for most patients with chronic pain. Opioids don t reduce swelling, one of the causes of pain.                                    Other ways to manage chronic pain and improve function include:       Treat the health problem that may be causing pain    Anti-inflammation medicines, which reduce swelling and tenderness, such as ibuprofen (Advil, Motrin) or naproxen (Aleve)    Acetaminophen (Tylenol)    Antidepressants and anti-seizure medicines, especially for nerve pain    Topical treatments such as patches or creams    Injections or nerve blocks    Chiropractic or osteopathic treatment    Acupuncture, massage, deep breathing, meditation, visual imagery, aromatherapy    Use heat or ice at the pain site    Physical therapy     Exercise    Stop smoking    Take part in therapy       Risks and side effects     Talk to your doctor before you start or decide to keep taking opioids. Possible side effects include:      Lowering your breathing rate enough to cause death    Overdose, including death, especially if taking higher than prescribed doses    Worse depression symptoms; less pleasure in things you usually enjoy    Feeling tired or sluggish    Slower thoughts or cloudy thinking    Being more sensitive to pain over time; pain is harder to control    Trouble sleeping or restless sleep    Changes in hormone levels (for example, less testosterone)    Changes in sex drive or ability to have sex    Constipation    Unsafe driving    Itching and sweating    Dizziness    Nausea, throwing up and dry mouth    What else should I know about opioids?    Opioids may lead to dependence, tolerance, or addiction.      Dependence means that if you stop or reduce the medicine too quickly, you will have withdrawal symptoms. These include loose poop (diarrhea), jitters, flu-like symptoms,  nervousness and tremors. Dependence is not the same as addiction.                       Tolerance means needing higher doses over time to get the same effect. This may increase the chance of serious side effects.      Addiction is when people improperly use a substance that harms their body, their mind or their relations with others. Use of opiates can cause a relapse of addiction if you have a history of drug or alcohol abuse.      People who have used opioids for a long time may have a lower quality of life, worse depression, higher levels of pain and more visits to doctors.    You can overdose on opioids. Take these steps to lower your risk of overdose:    1. Recognize the signs:  Signs of overdose include decrease or loss of consciousness (blackout), slowed breathing, trouble waking up and blue lips. If someone is worried about overdose, they should call 911.    2. Talk to your doctor about Narcan (naloxone).   If you are at risk for overdose, you may be given a prescription for Narcan. This medicine very quickly reverses the effects of opioids.   If you overdose, a friend or family member can give you Narcan while waiting for the ambulance. They need to know the signs of overdose and how to give Narcan.     3. Don't use alcohol or street drugs.   Taking them with opioids can cause death.    4. Do not take any of these medicines unless your doctor says it s OK. Taking these with opioids can cause death:    Benzodiazepines, such as lorazepam (Ativan), alprazolam (Xanax) or diazepam (Valium)    Muscle relaxers, such as cyclobenzaprine (Flexeril)    Sleeping pills like zolpidem (Ambien)     Other opioids      How to keep you and other people safe while taking opioids:    1. Never share your opioids with others.  Opioid medicines are regulated by the Drug Enforcement Agency (NUVIA). Selling or sharing medications is a criminal act.    2. Be sure to store opioids in a secure place, locked up if possible. Young children  can easily swallow them and overdose.    3. When you are traveling with your medicines, keep them in the original bottles. If you use a pill box, be sure you also carry a copy of your medicine list from your clinic or pharmacy.    4. Safe disposal of opioids    Most pharmacies have places to get rid of medicine, called disposal kiosks. Medicine disposal options are also available in every [facility]. Search your county and  medication disposal  to find more options. You can find more details at:  [email or web address]     I agree that my provider, clinic care team, and pharmacy may work with any city, state or federal law enforcement agency that investigates the misuse, sale, or other diversion of my controlled medicine. I will allow my provider to discuss my care with, or share a copy of, this agreement with any other treating provider, pharmacy or emergency room where I receive care.    I have read this agreement and have asked questions about anything I did not understand.    _______________________________________________________  Patient Signature - Hailee Mayo _____________________                   Date     _______________________________________________________  Provider Signature - Kaleb Mcelroy MD   _____________________                   Date     _______________________________________________________  Witness Signature (required if provider not present while patient signing)   _____________________                   Date

## 2022-10-31 NOTE — PROGRESS NOTES
Hailee is a 66 year old who is being evaluated via a billable telephone visit.    Does not have smart phone or computer for video visit      Assessment & Plan     Chronic pain syndrome  Chronic, continuous use of opioids  Complex regional pain syndrome type 1 of left lower extremity  Stable  Is not interested in spinal cord stimulation  She has rearranged her life to deal with the pain  Mailed consent. She will sign and return    [facility] - virtual visit   - expected   FAQ - 60   PEG - 3 (10/31/22) 4 (7.5.22)  Care Plan Date: July 2022 reviewed.   Consent - July 2022 sent in [email or web address], mailed 10/31/22  Current morphine equivalents/day = 37.5 mg   Naloxone - Yes    - oxyCODONE (ROXICODONE) 5 MG tablet; Take 1 tablet (5 mg) by mouth every 4 hours as needed for pain or moderate to severe pain (for 68 days per e-Scripts)    Insomnia, unspecified type  Has experimented off med  Is unable to sleep  - zolpidem (AMBIEN) 10 MG tablet; Take 1 tablet (10 mg) by mouth nightly as needed for sleep    Encounter for screening mammogram for malignant neoplasm of breast  - MA Screening Bilateral; Future    RTC 68 days  Declines in-person visit due to winter  Oxycodone refill  Discuss Pain visit in spring               Kaleb Mcelroy MD  [facility]    Ashley Stephen is a 66 year old, presenting for the following health issues:  No chief complaint on file.      HPI   1. Chronic Pain  Exercise - Pool 5d/wk  No falls  Sleep - good with ambien      Needs refills  Oxy  Zolpidem      2. Discussed spinal cord stimulation  She had bad experience in past      3. Covid  Granddaughter - last fall 2021  Hailee got it last fall 2021  Cj - 1.5 months ago  [facility] - 3 weeks ago  [facility] - now        Pain History:    PHQ-9 SCORE 2/5/2018 4/13/2020 1/3/2022   PHQ-9 Total Score - - -   PHQ-9 Total Score 3 3 0       EDUAR-7 SCORE 4/13/2020 5/25/2021 7/5/2022   Total Score - - -   Total Score 1 0 1       PEG Score 7/5/2022 10/31/2022    PEG Total Score 4 3       Chronic Pain Follow Up:      PDMP Review       Value Time User    State PDMP site checked  Yes 10/31/2022 10:01 AM Kaleb Mcelroy MD        Last CSA Agreement:   CSA -- Patient Level:     [Media Unavailable] Controlled Substance Agreement - Opioid - Scan on 10/19/2022  2:55 PM   [Media Unavailable] Controlled Substance Agreement - Opioid - Scan on 6/4/2021 12:34 PM       Last UDS: 4/27/2022      Review of Systems         Objective           Vitals:  No vitals were obtained today due to virtual visit.    Physical Exam   healthy, alert and no distress  PSYCH: Alert and oriented times 3; coherent speech, normal   rate and volume, able to articulate logical thoughts, able   to abstract reason, no tangential thoughts, no hallucinations   or delusions  Her affect is normal  RESP: No cough, no audible wheezing, able to talk in full sentences  Remainder of exam unable to be completed due to telephone visits                Start - 9:59 AM    End - 10:25 AM    Phone call duration:26 minutes

## 2022-10-31 NOTE — PATIENT INSTRUCTIONS
Opioid / Opioid Plus Controlled Substance Agreement    This is an agreement between you and your provider about the safe and appropriate use of controlled substance/opioids prescribed by your care team. Controlled substances are medicines that can cause physical and mental dependence (abuse).    There are strict laws about having and using these medicines. We here at [facility] are committing to working with you in your efforts to get better. To support you in this work, we ll help you schedule regular office appointments for medicine refills. If we must cancel or change your appointment for any reason, we ll make sure you have enough medicine to last until your next appointment.     As a Provider, I will:  Listen carefully to your concerns and treat you with respect.   Recommend a treatment plan that I believe is in your best interest. This plan may involve therapies other than opioid pain medication.   Talk with you often about the possible benefits, and the risk of harm of any medicine that we prescribe for you.   Provide a plan on how to taper (discontinue or go off) using this medicine if the decision is made to stop its use.    As a Patient, I understand that opioid(s):   Are a controlled substance prescribed by my care team to help me function or work and manage my condition(s).   Are strong medicines and can cause serious side effects such as:  Drowsiness, which can seriously affect my driving ability  A lower breathing rate, enough to cause death  Harm to my thinking ability   Depression   Abuse of and addiction to this medicine  Need to be taken exactly as prescribed. Combining opioids with certain medicines or chemicals (such as illegal drugs, sedatives, sleeping pills, and benzodiazepines) can be dangerous or even fatal. If I stop opioids suddenly, I may have severe withdrawal symptoms.  Do not work for all types of pain nor for all patients. If they re not helpful, I may be asked to stop  them.        The risks, benefits and side effects of these medicine(s) were explained to me. I agree that:  I will take part in other treatments as advised by my care team. This may be psychiatry or counseling, physical therapy, behavioral therapy, group treatment or a referral to a specialist.     I will keep all my appointments. I understand that this is part of the monitoring of opioids. My care team may require an office visit for EVERY opioid/controlled substance refill. If I miss appointments or don t follow instructions, my care team may stop my medicine.    I will take my medicines as prescribed. I will not change the dose or schedule unless my care team tells me to. There will be no refills if I run out early.     I may be asked to come to the clinic and complete a urine drug test or complete a pill count at any time. If I don t give a urine sample or participate in a pill count, the care team may stop my medicine.    I will only receive prescriptions from this clinic for chronic pain. If I am treated by another provider for acute pain issues, I will tell them that I am taking opioid pain medication for chronic pain and that I have a treatment agreement with this provider. I will inform my [facility] care team within one business day if I am given a prescription for any pain medication by another healthcare provider. My [facility] care team can contact other providers and pharmacists about my use of any medicines.    It is up to me to make sure that I don t run out of my medicines on weekends or holidays. If my care team is willing to refill my opioid prescription without a visit, I must request refills only during office hours. Refills may take up to 3 business days to process. I will use one pharmacy to fill all my opioid and other controlled substance prescriptions. I will notify the clinic about any changes to my insurance or medication availability.    I am responsible for my  prescriptions. If the medicine/prescription is lost, stolen or destroyed, it will not be replaced. I also agree not to share controlled substance medicines with anyone.    I am aware I should not use any illegal or recreational drugs. I agree not to drink alcohol unless my care team says I can.       If I enroll in the [place] [facility] Cannabis program, I will tell my care team prior to my next refill.     I will tell my care team right away if I become pregnant, have a new medical problem treated outside of my regular clinic, or have a change in my medications.    I understand that this medicine can affect my thinking, judgment and reaction time. Alcohol and drugs affect the brain and body, which can affect the safety of my driving. Being under the influence of alcohol or drugs can affect my decision-making, behaviors, personal safety, and the safety of others. Driving while impaired (DWI) can occur if a person is driving, operating, or in physical control of a car, motorcycle, boat, snowmobile, ATV, motorbike, off-road vehicle, or any other motor vehicle ([place] Statute 169A.20). I understand the risk if I choose to drive or operate any vehicle or machinery.    I understand that if I do not follow any of the conditions above, my prescriptions or treatment may be stopped or changed.          Opioids  What You Need to Know    What are opioids?   Opioids are pain medicines that must be prescribed by a doctor. They are also known as narcotics.     Examples are:   morphine (MS Contin, Jennyfer)  oxycodone (Oxycontin)  oxycodone and acetaminophen (Percocet)  hydrocodone and acetaminophen (Vicodin, Norco)   fentanyl patch (Duragesic)   hydromorphone (Dilaudid)   methadone  codeine (Tylenol #3)     What do opioids do well?   Opioids are best for severe short-term pain such as after a surgery or injury. They may work well for cancer pain. They may help some people with long-lasting (chronic) pain.     What do opioids NOT do  well?   Opioids never get rid of pain entirely, and they don t work well for most patients with chronic pain. Opioids don t reduce swelling, one of the causes of pain.                                    Other ways to manage chronic pain and improve function include:     Treat the health problem that may be causing pain  Anti-inflammation medicines, which reduce swelling and tenderness, such as ibuprofen (Advil, Motrin) or naproxen (Aleve)  Acetaminophen (Tylenol)  Antidepressants and anti-seizure medicines, especially for nerve pain  Topical treatments such as patches or creams  Injections or nerve blocks  Chiropractic or osteopathic treatment  Acupuncture, massage, deep breathing, meditation, visual imagery, aromatherapy  Use heat or ice at the pain site  Physical therapy   Exercise  Stop smoking  Take part in therapy       Risks and side effects     Talk to your doctor before you start or decide to keep taking opioids. Possible side effects include:    Lowering your breathing rate enough to cause death  Overdose, including death, especially if taking higher than prescribed doses  Worse depression symptoms; less pleasure in things you usually enjoy  Feeling tired or sluggish  Slower thoughts or cloudy thinking  Being more sensitive to pain over time; pain is harder to control  Trouble sleeping or restless sleep  Changes in hormone levels (for example, less testosterone)  Changes in sex drive or ability to have sex  Constipation  Unsafe driving  Itching and sweating  Dizziness  Nausea, throwing up and dry mouth    What else should I know about opioids?    Opioids may lead to dependence, tolerance, or addiction.    Dependence means that if you stop or reduce the medicine too quickly, you will have withdrawal symptoms. These include loose poop (diarrhea), jitters, flu-like symptoms, nervousness and tremors. Dependence is not the same as addiction.                     Tolerance means needing higher doses over time to  get the same effect. This may increase the chance of serious side effects.    Addiction is when people improperly use a substance that harms their body, their mind or their relations with others. Use of opiates can cause a relapse of addiction if you have a history of drug or alcohol abuse.    People who have used opioids for a long time may have a lower quality of life, worse depression, higher levels of pain and more visits to doctors.    You can overdose on opioids. Take these steps to lower your risk of overdose:    Recognize the signs:  Signs of overdose include decrease or loss of consciousness (blackout), slowed breathing, trouble waking up and blue lips. If someone is worried about overdose, they should call 911.    Talk to your doctor about Narcan (naloxone).   If you are at risk for overdose, you may be given a prescription for Narcan. This medicine very quickly reverses the effects of opioids.   If you overdose, a friend or family member can give you Narcan while waiting for the ambulance. They need to know the signs of overdose and how to give Narcan.     Don't use alcohol or street drugs.   Taking them with opioids can cause death.    Do not take any of these medicines unless your doctor says it s OK. Taking these with opioids can cause death:  Benzodiazepines, such as lorazepam (Ativan), alprazolam (Xanax) or diazepam (Valium)  Muscle relaxers, such as cyclobenzaprine (Flexeril)  Sleeping pills like zolpidem (Ambien)   Other opioids      How to keep you and other people safe while taking opioids:    Never share your opioids with others.  Opioid medicines are regulated by the Drug Enforcement Agency (NUVIA). Selling or sharing medications is a criminal act.    2. Be sure to store opioids in a secure place, locked up if possible. Young children can easily swallow them and overdose.    3. When you are traveling with your medicines, keep them in the original bottles. If you use a pill box, be sure you also  carry a copy of your medicine list from your clinic or pharmacy.    4. Safe disposal of opioids    Most pharmacies have places to get rid of medicine, called disposal kiosks. Medicine disposal options are also available in every [facility]. Search your county and  medication disposal  to find more options. You can find more details at:  [email or web address]://[email or web address]-[email or web address]-medications     I agree that my provider, clinic care team, and pharmacy may work with any city, state or federal law enforcement agency that investigates the misuse, sale, or other diversion of my controlled medicine. I will allow my provider to discuss my care with, or share a copy of, this agreement with any other treating provider, pharmacy or emergency room where I receive care.    I have read this agreement and have asked questions about anything I did not understand.    _______________________________________________________  Patient Signature - Hailee Mayo _____________________                   Date     _______________________________________________________  Provider Signature - Kaleb Mcelroy MD   _____________________                   Date     _______________________________________________________  Witness Signature (required if provider not present while patient signing)   _____________________                   Date

## 2022-11-22 ENCOUNTER — TRANSFERRED RECORDS (OUTPATIENT)
Dept: HEALTH INFORMATION MANAGEMENT | Facility: CLINIC | Age: 67
End: 2022-11-22

## 2022-12-05 DIAGNOSIS — E78.5 HYPERLIPIDEMIA LDL GOAL <130: ICD-10-CM

## 2022-12-06 RX ORDER — SIMVASTATIN 20 MG
TABLET ORAL
Qty: 90 TABLET | Refills: 3 | Status: SHIPPED | OUTPATIENT
Start: 2022-12-06 | End: 2022-12-26

## 2022-12-06 NOTE — TELEPHONE ENCOUNTER
"Request for medication refill:  simvastatin (ZOCOR) 20 MG tablet    Providers if patient needs an appointment and you are willing to give a one month supply please refill for one month and  send a letter/MyChart using \".SMILLIMITEDREFILL\" .smillimited and route chart to \"P SMI \" (Giving one month refill in non controlled medications is strongly recommended before denial)    If refill has been denied, meaning absolutely no refills without visit, please complete the smart phrase \".smirxrefuse\" and route it to the \"P SMI MED REFILLS\"  pool to inform the patient and the pharmacy.    Bonita Chen MA        "

## 2022-12-26 ENCOUNTER — VIRTUAL VISIT (OUTPATIENT)
Dept: FAMILY MEDICINE | Facility: CLINIC | Age: 67
End: 2022-12-26
Payer: COMMERCIAL

## 2022-12-26 DIAGNOSIS — F11.90 CHRONIC, CONTINUOUS USE OF OPIOIDS: ICD-10-CM

## 2022-12-26 DIAGNOSIS — E78.5 HYPERLIPIDEMIA LDL GOAL <130: ICD-10-CM

## 2022-12-26 DIAGNOSIS — G90.522 COMPLEX REGIONAL PAIN SYNDROME TYPE 1 OF LEFT LOWER EXTREMITY: ICD-10-CM

## 2022-12-26 DIAGNOSIS — G89.4 CHRONIC PAIN SYNDROME: ICD-10-CM

## 2022-12-26 DIAGNOSIS — B00.9 HERPES SIMPLEX VIRUS INFECTION: ICD-10-CM

## 2022-12-26 PROCEDURE — 99215 OFFICE O/P EST HI 40 MIN: CPT | Mod: 95 | Performed by: FAMILY MEDICINE

## 2022-12-26 RX ORDER — VALACYCLOVIR HYDROCHLORIDE 500 MG/1
500 TABLET, FILM COATED ORAL DAILY
Qty: 180 TABLET | Refills: 3 | Status: SHIPPED | OUTPATIENT
Start: 2022-12-26 | End: 2023-04-28

## 2022-12-26 RX ORDER — SIMVASTATIN 20 MG
20 TABLET ORAL AT BEDTIME
Qty: 90 TABLET | Refills: 3 | Status: SHIPPED | OUTPATIENT
Start: 2022-12-26 | End: 2023-11-16

## 2022-12-26 RX ORDER — OXYCODONE HYDROCHLORIDE 5 MG/1
5 TABLET ORAL EVERY 4 HOURS PRN
Qty: 340 TABLET | Refills: 0 | Status: SHIPPED | OUTPATIENT
Start: 2022-12-26 | End: 2023-02-22

## 2022-12-26 RX ORDER — GABAPENTIN 600 MG/1
1200 TABLET ORAL 3 TIMES DAILY
Qty: 540 TABLET | Refills: 3 | Status: SHIPPED | OUTPATIENT
Start: 2022-12-26 | End: 2023-11-16

## 2022-12-26 ASSESSMENT — PATIENT HEALTH QUESTIONNAIRE - PHQ9: SUM OF ALL RESPONSES TO PHQ QUESTIONS 1-9: 2

## 2022-12-26 NOTE — PROGRESS NOTES
Hailee is a 66 year old who is being evaluated via a billable telephone visit.    Unable to do video visit      Assessment & Plan     Chronic pain syndrome  Complex regional pain syndrome type 1 of left lower extremity  Chronic, continuous use of opioids  Stable  Discussed future physicians. Name offered.    Utox - virtual visit   - expected   FAQ - 60   PEG -  3 (12/26/22) 3 (10/31/22) 4 (7.5.22)  Care Plan Date: July 2022 reviewed.   Consent - July 2022, Nov 7, 2022  Current morphine equivalents/day = 37.5 mg   Naloxone - Yes      - oxyCODONE (ROXICODONE) 5 MG tablet; Take 1 tablet (5 mg) by mouth every 4 hours as needed for pain or moderate to severe pain (for 68 days per e-Scripts)  - gabapentin (NEURONTIN) 600 MG tablet; Take 2 tablets (1,200 mg) by mouth 3 times daily      Refills  Hyperlipidemia LDL goal <130  - simvastatin (ZOCOR) 20 MG tablet; Take 1 tablet (20 mg) by mouth At Bedtime  Herpes simplex  - valACYclovir (VALTREX) 500 MG tablet; Take 1 tablet (500 mg) by mouth daily    RTC 2 months   Refills  Discuss if contact with a new physician          60 minutes spent on the date of the encounter doing chart review, history and exam, documentation and further activities per the note. 30min on phone call. Rest includes finding potential future provider for her.         Kaleb Mcelroy MD  [facility]    Ashley Stephen is a 66 year old, presenting for the following health issues:  No chief complaint on file.      HPI  1. Chronic Pain / CRPS  Stable  Exercise - Pool 5 days/week  Oxycodone - 5 pills / day    Utox - virtual visit   - expected   FAQ - 60   PEG -  3 (12/26/22) 3 (10/31/22) 4 (7.5.22)  Care Plan Date: July 2022 reviewed.   Consent - July 2022, Nov 7, 2022  Current morphine equivalents/day = 37.5 mg   Naloxone - Yes    PHQ9 - 2  PEG - 2.67. Doesn't really know how to answer. She has changed her life to deal with the pain, so unable to give a true assessment of how it  effects her life, general activity        2. Mammo  Done    3. New doctor  Yolanda Lockwood at [facility] in [place].          PHQ-9 SCORE 4/13/2020 1/3/2022 12/26/2022   PHQ-9 Total Score - - -   PHQ-9 Total Score 3 0 2       EDUAR-7 SCORE 4/13/2020 5/25/2021 7/5/2022   Total Score - - -   Total Score 1 0 1   56}    PEG Score 7/5/2022 10/31/2022 12/26/2022   PEG Total Score 4 3 2.67         PDMP Review       Value Time User    State PDMP site checked  Yes 12/26/2022  9:58 AM Kaleb Mcelroy MD        Last CSA Agreement:   CSA -- Patient Level:     [Media Unavailable] Controlled Substance Agreement - Opioid - Scan on 11/10/2022  8:48 AM   [Media Unavailable] Controlled Substance Agreement - Opioid - Scan on 10/19/2022  2:55 PM   [Media Unavailable] Controlled Substance Agreement - Opioid - Scan on 6/4/2021 12:34 PM       Last UDS: 4/27/2022      Review of Systems         Objective           Vitals:  No vitals were obtained today due to virtual visit.    Physical Exam   healthy, alert and no distress  PSYCH: Alert and oriented times 3; coherent speech, normal   rate and volume, able to articulate logical thoughts, able   to abstract reason, no tangential thoughts, no hallucinations   or delusions  Her affect is normal  RESP: No cough, no audible wheezing, able to talk in full sentences  Remainder of exam unable to be completed due to telephone visits                Duration 27  Start 10:54 AM  End 11:21 AM  20 min spent finding name, number for Dr Tanna Lockwood in [place]    Phone call duration: 33 minutes

## 2023-02-22 ENCOUNTER — VIRTUAL VISIT (OUTPATIENT)
Dept: FAMILY MEDICINE | Facility: CLINIC | Age: 68
End: 2023-02-22
Payer: COMMERCIAL

## 2023-02-22 DIAGNOSIS — G90.522 COMPLEX REGIONAL PAIN SYNDROME TYPE 1 OF LEFT LOWER EXTREMITY: ICD-10-CM

## 2023-02-22 DIAGNOSIS — G89.4 CHRONIC PAIN SYNDROME: ICD-10-CM

## 2023-02-22 DIAGNOSIS — F11.90 CHRONIC, CONTINUOUS USE OF OPIOIDS: ICD-10-CM

## 2023-02-22 PROCEDURE — 99214 OFFICE O/P EST MOD 30 MIN: CPT | Mod: 95 | Performed by: FAMILY MEDICINE

## 2023-02-22 RX ORDER — OXYCODONE HYDROCHLORIDE 5 MG/1
5 TABLET ORAL EVERY 4 HOURS PRN
Qty: 340 TABLET | Refills: 0 | Status: SHIPPED | OUTPATIENT
Start: 2023-02-22 | End: 2023-04-28

## 2023-02-22 NOTE — PROGRESS NOTES
Hailee is a 67 year old who is being evaluated via a billable telephone visit.    Distant Location (provider location):  On-site       Assessment & Plan     Chronic pain syndrome  Chronic, continuous use of opioids  Complex regional pain syndrome type 1 of left lower extremity  Unable to find a physician in her area willing to continue oxycodone. They are requiring a pain clinic to prescribe opioids    Patient with Complex Regional Pain Syndrome from failed surgery for plantar fascitis.  Was seen for years by Pain Clinic. Multiple modalities tried without success. Declined spinal implant. Ultimately agreed on oxycodone and gabapentin long term. Control has been reasonable but has required lots of lifestyle changes in addition.  I assumed opioid prescribing in 2012    She has followed a personal care plan including exercise, wt control, sleep, behavioral health  She used sertraline for 3-4 years for anxiety/depression with good results  Has not violated any opioid contract rules. 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 We have discussed that this is a risk factor with opioids. She has tried to wean without success. Good sleep has helped pain control.    To assist future providers, patient agrees to a Pain Clinic consultation to review potential new treatments and other options for pain control.     Utox - virtual visit   - expected   FAQ - 60   PEG - 3 (10/31/22) 4 (7.5.22)  PHQ9 - PHQ2=0 (2/22/23)  Care Plan Date: July 2022 reviewed.   Consent - Nov 2022  Current morphine equivalents/day = 37.5 mg   Naloxone - Yes    Plan  Refill oxycodone today   Pain clinic consultation  Will discuss transfer of care with colleagues  RTC in April      - Pain Management [facility] Referral; Future  - oxyCODONE (ROXICODONE) 5 MG tablet; Take 1 tablet (5 mg) by mouth every 4 hours as needed for pain or moderate to severe pain  (for 68 days per e-Scripts)                 Kaleb Mcelroy MD  [facility] JHONATAN Stephen is a 67 year old, presenting for the following health issues:  Refill Request (Pt is on the phone for her medication refill )      HPI   1. Chronic Pain  Unable to find a new doctor to prescribe oxycodone  Frustrated and upset by the systems with someone who has followed the rules, not had adverse reactions, and has had good control of symptoms.  Long discussion with options    Discussed pain clinic - one time consultation  Discussed ambein as a potential confounder due to increase risk    Pain stable  Following personal care plan              Review of Systems         Objective           Vitals:  No vitals were obtained today due to virtual visit.    Physical Exam   healthy, alert and no distress  PSYCH: Alert and oriented times 3; coherent speech, normal   rate and volume, able to articulate logical thoughts, able   to abstract reason, no tangential thoughts, no hallucinations   or delusions  Her affect is normal  RESP: No cough, no audible wheezing, able to talk in full sentences  Remainder of exam unable to be completed due to telephone visits                Phone call duration: 23 minutes  End 11:03 AM

## 2023-02-24 NOTE — TELEPHONE ENCOUNTER
RECORDS RECEIVED FROM: Internal   REASON FOR VISIT: One Time Consult-Complex regional pain syndrome type 1 of left lower extremity [G90.522   Date of Appt: 06/15/2023   NOTES (FOR ALL VISITS) STATUS DETAILS   OFFICE NOTE from referring provider Internal 02/22/2023 Dr Mcelory [facility]    OFFICE NOTE from other specialist Internal 03/26/2013 Dr kumar [facility]    DISCHARGE SUMMARY from hospital N/A    DISCHARGE REPORT from the ER N/A    OPERATIVE REPORT N/A    MEDICATION LIST N/A    IMAGING  (FOR ALL VISITS)     EMG N/A    EEG N/A    LUMBAR PUNCTURE N/A    TAL SCAN N/A    ULTRASOUND (CAROTID BILAT) *VASCULAR* N/A    MRI (HEAD, NECK, SPINE) N/A    CT (HEAD, NECK, SPINE) N/A

## 2023-04-23 ENCOUNTER — HEALTH MAINTENANCE LETTER (OUTPATIENT)
Age: 68
End: 2023-04-23

## 2023-04-28 ENCOUNTER — OFFICE VISIT (OUTPATIENT)
Dept: FAMILY MEDICINE | Facility: CLINIC | Age: 68
End: 2023-04-28
Payer: COMMERCIAL

## 2023-04-28 VITALS
BODY MASS INDEX: 32.27 KG/M2 | WEIGHT: 188 LBS | TEMPERATURE: 97.7 F | DIASTOLIC BLOOD PRESSURE: 86 MMHG | RESPIRATION RATE: 16 BRPM | OXYGEN SATURATION: 97 % | HEART RATE: 66 BPM | SYSTOLIC BLOOD PRESSURE: 138 MMHG

## 2023-04-28 DIAGNOSIS — R73.09 ABNORMAL GLUCOSE: ICD-10-CM

## 2023-04-28 DIAGNOSIS — E78.5 HYPERLIPIDEMIA LDL GOAL <130: ICD-10-CM

## 2023-04-28 DIAGNOSIS — G89.4 CHRONIC PAIN SYNDROME: Primary | ICD-10-CM

## 2023-04-28 DIAGNOSIS — G90.522 COMPLEX REGIONAL PAIN SYNDROME TYPE 1 OF LEFT LOWER EXTREMITY: ICD-10-CM

## 2023-04-28 DIAGNOSIS — F11.90 CHRONIC, CONTINUOUS USE OF OPIOIDS: ICD-10-CM

## 2023-04-28 DIAGNOSIS — G47.00 INSOMNIA, UNSPECIFIED TYPE: ICD-10-CM

## 2023-04-28 LAB
ALBUMIN SERPL BCG-MCNC: 4.4 G/DL (ref 3.5–5.2)
ALP SERPL-CCNC: 88 U/L (ref 35–104)
ALT SERPL W P-5'-P-CCNC: 19 U/L (ref 10–35)
AMPHETAMINES UR QL: NOT DETECTED
ANION GAP SERPL CALCULATED.3IONS-SCNC: 13 MMOL/L (ref 7–15)
AST SERPL W P-5'-P-CCNC: 27 U/L (ref 10–35)
BARBITURATES UR QL SCN: NOT DETECTED
BENZODIAZ UR QL SCN: NOT DETECTED
BILIRUB SERPL-MCNC: 0.2 MG/DL
BUN SERPL-MCNC: 12.7 MG/DL (ref 8–23)
BUPRENORPHINE UR QL: NOT DETECTED
CALCIUM SERPL-MCNC: 9.4 MG/DL (ref 8.8–10.2)
CANNABINOIDS UR QL: NOT DETECTED
CHLORIDE SERPL-SCNC: 102 MMOL/L (ref 98–107)
CHOLEST SERPL-MCNC: 164 MG/DL
COCAINE UR QL SCN: NOT DETECTED
CREAT SERPL-MCNC: 0.92 MG/DL (ref 0.51–0.95)
D-METHAMPHET UR QL: NOT DETECTED
DEPRECATED HCO3 PLAS-SCNC: 27 MMOL/L (ref 22–29)
GFR SERPL CREATININE-BSD FRML MDRD: 68 ML/MIN/1.73M2
GLUCOSE SERPL-MCNC: 89 MG/DL (ref 70–99)
HBA1C MFR BLD: 5.9 % (ref 0–5.6)
HDLC SERPL-MCNC: 52 MG/DL
LDLC SERPL CALC-MCNC: 87 MG/DL
METHADONE UR QL SCN: NOT DETECTED
NONHDLC SERPL-MCNC: 112 MG/DL
OPIATES UR QL SCN: NOT DETECTED
OXYCODONE UR QL SCN: DETECTED
PCP UR QL SCN: NOT DETECTED
POTASSIUM SERPL-SCNC: 3.6 MMOL/L (ref 3.4–5.3)
PROPOXYPH UR QL: NOT DETECTED
PROT SERPL-MCNC: 7.1 G/DL (ref 6.4–8.3)
SODIUM SERPL-SCNC: 142 MMOL/L (ref 136–145)
TRICYCLICS UR QL SCN: NOT DETECTED
TRIGL SERPL-MCNC: 127 MG/DL

## 2023-04-28 PROCEDURE — 80053 COMPREHEN METABOLIC PANEL: CPT | Performed by: FAMILY MEDICINE

## 2023-04-28 PROCEDURE — 80061 LIPID PANEL: CPT | Performed by: FAMILY MEDICINE

## 2023-04-28 PROCEDURE — 83036 HEMOGLOBIN GLYCOSYLATED A1C: CPT | Performed by: FAMILY MEDICINE

## 2023-04-28 PROCEDURE — 80306 DRUG TEST PRSMV INSTRMNT: CPT | Performed by: FAMILY MEDICINE

## 2023-04-28 PROCEDURE — 36415 COLL VENOUS BLD VENIPUNCTURE: CPT | Performed by: FAMILY MEDICINE

## 2023-04-28 PROCEDURE — 99214 OFFICE O/P EST MOD 30 MIN: CPT | Performed by: FAMILY MEDICINE

## 2023-04-28 RX ORDER — ZOLPIDEM TARTRATE 10 MG/1
10 TABLET ORAL
Qty: 90 TABLET | Refills: 1 | Status: SHIPPED | OUTPATIENT
Start: 2023-04-28 | End: 2023-11-16

## 2023-04-28 RX ORDER — OXYCODONE HYDROCHLORIDE 5 MG/1
5 TABLET ORAL EVERY 4 HOURS PRN
Qty: 340 TABLET | Refills: 0 | Status: SHIPPED | OUTPATIENT
Start: 2023-04-28 | End: 2023-06-29

## 2023-04-28 NOTE — LETTER
Personal Care Plan for Chronic Pain  Reviewed 4/28/23     1.  Personal Goals:  Engaging and spending time with my grandkids as much as I can, getting to my water aerobics and spending that time with the others int he class and my mom, spending time with my mom and my sister, feel like I am still of use in this world, that I am important to my grandma, a good partner to my , to stay in my house and keep it clean        2.  Sleep:                          *  Basic sleep plan:                                              *reduce or eliminate caffeine and daytime naps                                              * relaxation before bed                                              * limit screen time 1-2 hours prior to bed                                              * establish dark/quiet sleep environment                        *  Nighttime medications including the following: zolpidem     3.  Physical Activity:                          * Home/community based activity:                                              * Yoga dvd 1x/week                                              * water aerobics 3-4x/week:                          * Listen to your body.  Pace yourself for success.  Don't over-do it.  Don't under do it.                        * Balance activities with rest and set realistic goals.      4.  Nutrition/Weight:                          * Try to eat at least 5 servings of fresh fruits and vegetables each day.                        * Limit processed foods and foods high in sugar, sodium and fat.                        * Maintain a healthy weight.      5.  Mood/Stress Management:                         * Listening to healing cd for relaxation and pain management - can use for acute pain, but it's also helpful to do this daily to help retrain brain                        * Listening Kimberly music                        * Taking sertraline medication daily     6.  Pain:                          *  Non-medication treatments:                                              * light massage as tolerated     7.  Pain Medications: oxycodone as prescribed                 JEAN CLAUDE Villarreal, GORAN Rodriguez, MANOJ Parrish, DENYS Acosta., JIMY New., & ADRIANA Mcgovern. Cognitive behavioral therapy for chronic pain among veterans: Therapist manual. [place], [place]: [place].          JEAN CLAUDE Villarreal, GORAN Rodriguez, MANOJ Parrish, DENYS Acosta., JIMY New., & ADRIANA Mcgovern. Cognitive behavioral therapy for chronic pain among veterans: Therapist manual. [place], [place]: [employer] [facility].

## 2023-04-28 NOTE — PROGRESS NOTES
Assessment & Plan     Chronic pain syndrome  Chronic, continuous use of opioids  Complex regional pain syndrome type 1 of left lower extremity  CRPS started after surgery for plantar fascitis  Has tried multiple regiments for pain control  Use of opioids has been the best and has allowed her to develop a fulfilling life with chronic pain    Scheduled for one time Pain Consult to see if there are new options to consider.   If not, will continue with present regiment.    Utox - expected   - expected   FAQ - 60   PEG - 3 (4/28/23) 3 (10/31/22) 4 (7.5.22)  PHQ9 - PHQ2=0 (4/28/23)  Care Plan Date: April 2023 reviewed.   Consent - Nov 2022  Current morphine equivalents/day = 37.5 mg   Naloxone - Yes      - [id number] - Urine Drugs of Abuse Screen Panel 13; Future  - [id number] - Urine Drugs of Abuse Screen Panel 13  - oxyCODONE (ROXICODONE) 5 MG tablet; Take 1 tablet (5 mg) by mouth every 4 hours as needed for pain or moderate to severe pain (for 68 days per e-Scripts)    Hyperlipidemia LDL goal <130  LDL= 87  Continue present regiment    - Comprehensive metabolic panel; Future  - Lipid panel; Future  - Comprehensive metabolic panel  - Lipid panel    Abnormal glucose  A1c=5.9  Diet/exercise    - Hemoglobin A1c; Future  - Hemoglobin A1c    Insomnia, unspecified type  Started 30 yrs ago as   Aware of dangers of ambien + opioid    - zolpidem (AMBIEN) 10 MG tablet; Take 1 tablet (10 mg) by mouth nightly as needed for sleep    Transfer of Care  Dr Ellis    RTC 68 days   Pain med refill  This is the amount of meds [facility] will allow               Kaleb Mcelroy MD  [facility] JHONATAN Stephen is a 67 year old, presenting for the following health issues:  No chief complaint on file.      HPI   1. Chronic Pain / CRPs  Stable  Has revamped life to deal with the chronic pain  Reviewed Personal Care Plan  PEG = 3  Has appt with Pain Clinic for one time consult in  June 2023    2. Insomnia  30 yrs of ambien after starting during  days  Sleeps well  Aware of dangers of ambien + opioids    3. Hyperlipidemia  On simvastatin    4. Depression/anxiety  Off sertraline for years.  Still doing well            PEG: A Three-Item Scale Assessing Pain Intensity and Interference    What number best describes your PAIN ON AVERAGE in the past week?  2    What number best describes how, during the past week, pain has interfered with your ENJOYMENT OF LIFE?  0    What number best describes how, during the past week, pain has interfered with your GENERAL ACTIVITY?  1    PEG Total Score:  3    Hanh EE, Joe KA, Amadeo MJ, Berta TA, Maxwell J, Paris JM, Kedar SM, Andrez K. Development and initial validation of the PEG, a 3-item scale assessing pain intensity and interference. Journal of General Internal Medicine. 2009 Wally;24:733-738.        Review of Systems         Objective    /86 (BP Location: Left arm, Patient Position: Sitting, Cuff Size: Adult Regular)   Pulse 66   Temp 97.7  F (36.5  C) (Oral)   Resp 16   Wt 85.3 kg (188 lb)   SpO2 97%   BMI 32.27 kg/m    Body mass index is 32.27 kg/m .     Physical Exam   GENERAL: healthy, alert and no distress  MS: Neurovasc intact, no color changes today, able to touch without too much discomfort  SKIN: no suspicious lesions or rashes  PSYCH: mentation appears normal, affect normal/bright      Results for orders placed or performed in visit on 04/28/23   [id number] - Urine Drugs of Abuse Screen Panel 13     Status: Abnormal   Result Value Ref Range    Cannabinoids (66-lhm-7-carboxy-9-THC) Not Detected Not Detected, Indeterminate    Phencyclidine Not Detected Not Detected, Indeterminate    Cocaine (Benzoylecgonine) Not Detected Not Detected, Indeterminate    Methamphetamine (d-Methamphetamine) Not Detected Not Detected, Indeterminate    Opiates (Morphine) Not Detected Not Detected, Indeterminate    Amphetamine  (d-Amphetamine) Not Detected Not Detected, Indeterminate    Benzodiazepines (Nordiazepam) Not Detected Not Detected, Indeterminate    Tricyclic Antidepressants (Desipramine) Not Detected Not Detected, Indeterminate    Methadone Not Detected Not Detected, Indeterminate    Barbiturates (Butalbital) Not Detected Not Detected, Indeterminate    Oxycodone Detected (A) Not Detected, Indeterminate    Propoxyphene (Norpropoxyphene) Not Detected Not Detected, Indeterminate    Buprenorphine Not Detected Not Detected, Indeterminate   Hemoglobin A1c     Status: Abnormal   Result Value Ref Range    Hemoglobin A1C 5.9 (H) 0.0 - 5.6 %   Comprehensive metabolic panel     Status: Normal   Result Value Ref Range    Sodium 142 136 - 145 mmol/L    Potassium 3.6 3.4 - 5.3 mmol/L    Chloride 102 98 - 107 mmol/L    Carbon Dioxide (CO2) 27 22 - 29 mmol/L    Anion Gap 13 7 - 15 mmol/L    Urea Nitrogen 12.7 8.0 - 23.0 mg/dL    Creatinine 0.92 0.51 - 0.95 mg/dL    Calcium 9.4 8.8 - 10.2 mg/dL    Glucose 89 70 - 99 mg/dL    Alkaline Phosphatase 88 35 - 104 U/L    AST 27 10 - 35 U/L    ALT 19 10 - 35 U/L    Protein Total 7.1 6.4 - 8.3 g/dL    Albumin 4.4 3.5 - 5.2 g/dL    Bilirubin Total 0.2 <=1.2 mg/dL    GFR Estimate 68 >60 mL/min/1.73m2   Lipid panel     Status: Normal   Result Value Ref Range    Cholesterol 164 <200 mg/dL    Triglycerides 127 <150 mg/dL    Direct Measure HDL 52 >=50 mg/dL    LDL Cholesterol Calculated 87 <=100 mg/dL    Non HDL Cholesterol 112 <130 mg/dL    Narrative    Cholesterol  Desirable:  <200 mg/dL    Triglycerides  Normal:  Less than 150 mg/dL  Borderline High:  150-199 mg/dL  High:  200-499 mg/dL  Very High:  Greater than or equal to 500 mg/dL    Direct Measure HDL  Female:  Greater than or equal to 50 mg/dL   Male:  Greater than or equal to 40 mg/dL    LDL Cholesterol  Desirable:  <100mg/dL  Above Desirable:  100-129 mg/dL   Borderline High:  130-159 mg/dL   High:  160-189 mg/dL   Very High:  >= 190 mg/dL    Non HDL  Cholesterol  Desirable:  130 mg/dL  Above Desirable:  130-159 mg/dL  Borderline High:  160-189 mg/dL  High:  190-219 mg/dL  Very High:  Greater than or equal to 220 mg/dL

## 2023-06-15 ENCOUNTER — PRE VISIT (OUTPATIENT)
Dept: PALLIATIVE MEDICINE | Facility: CLINIC | Age: 68
End: 2023-06-15

## 2023-06-15 ENCOUNTER — OFFICE VISIT (OUTPATIENT)
Dept: PALLIATIVE MEDICINE | Facility: CLINIC | Age: 68
End: 2023-06-15
Attending: FAMILY MEDICINE
Payer: COMMERCIAL

## 2023-06-15 VITALS
HEART RATE: 57 BPM | BODY MASS INDEX: 32.27 KG/M2 | WEIGHT: 188 LBS | SYSTOLIC BLOOD PRESSURE: 124 MMHG | DIASTOLIC BLOOD PRESSURE: 82 MMHG

## 2023-06-15 DIAGNOSIS — G90.522 COMPLEX REGIONAL PAIN SYNDROME TYPE 1 OF LEFT LOWER EXTREMITY: ICD-10-CM

## 2023-06-15 PROCEDURE — 99203 OFFICE O/P NEW LOW 30 MIN: CPT

## 2023-06-15 RX ORDER — VALGANCICLOVIR 450 MG/1
450 TABLET, FILM COATED ORAL DAILY
COMMUNITY
End: 2023-06-29

## 2023-06-15 ASSESSMENT — PAIN SCALES - GENERAL: PAINLEVEL: MILD PAIN (3)

## 2023-06-15 NOTE — PATIENT INSTRUCTIONS
Follow up:      Dr. Salinas is making no changes and believes that your current treatment is a good one. You may follow up with your new PCP for prescriptions       To speak with a nurse, schedule/reschedule/cancel a clinic appointment, or request a medication refill call: [phone].    You can also reach us by MyChart: [email or web address]

## 2023-06-15 NOTE — PROGRESS NOTES
Hailee Mayo is a 67 year old female with a past medical history significant for CRPS, hypercholesterolemia who presents with a chief complaint of left leg pain.  The pain was present for 12 years. The pain started after a plantar fasciitis surgery.  She was seen in a Pain Clinic  The clinic was in [place].  She received injections and opioids (oxycodone 5mg 5x per day--37.50 MME). She states that she has never escalated the dose in 11 years.  She considered SCS, but declined as the medication worked. She denies any nausea, vomiting or sedation.  She denies constipation.  She has urine drug screens every other month.  UDS results have been appropriate.    The pain has been present for 12 years .    The pain is 2/10 in severity.    The pain is described as burning.   The pain is alleviated by medication.    It is exacerbated by overactivity.    Modalities that have been utilized in the past which were helpful include opioid medication.    Things that were not helpful, but tried ,include acupuncture, mirror therapy.    The patient has never tried topical medications.      Current Outpatient Medications   Medication     gabapentin (NEURONTIN) 600 MG tablet     Multiple Vitamins-Minerals (MULTIVITAMIN ADULT PO)     naloxone (NARCAN) nasal spray     oxyCODONE (ROXICODONE) 5 MG tablet     Probiotic Product (PROBIOTIC DAILY PO)     simvastatin (ZOCOR) 20 MG tablet     valGANciclovir (VALCYTE) 450 MG tablet     zolpidem (AMBIEN) 10 MG tablet     No current facility-administered medications for this visit.     Allergies   Allergen Reactions     Penicillins Anaphylaxis     Swelling. Tolerating ceftriaxone       Hydrocodone      Vicodin [Hydrocodone-Acetaminophen] Other (See Comments)     Burning in abd      Past Medical History:   Diagnosis Date     Complex regional pain syndrome      Sleep problems      Sore throat      Trouble swallowing      Past Surgical History:   Procedure Laterality Date      ------------OTHER-------------      Surgery to lengthen Left calf muscle     BUNIONECTOMY      Both feet     COLONOSCOPY N/A 9/7/2016    Procedure: COMBINED COLONOSCOPY, SINGLE OR MULTIPLE BIOPSY/POLYPECTOMY BY BIOPSY;  Surgeon: Duane, William Charles, MD;  Location: [facility] OR     COLONOSCOPY WITH CO2 INSUFFLATION N/A 9/7/2016    Procedure: COLONOSCOPY WITH CO2 INSUFFLATION;  Surgeon: Duane, William Charles, MD;  Location: [facility] OR     COLONOSCOPY WITH CO2 INSUFFLATION N/A 6/24/2021    Procedure: COLONOSCOPY, WITH CO2 INSUFFLATION;  Surgeon: Ravi Colón MD;  Location: [facility] OR     GYN SURGERY      uterine polyp removed     LARYNGOSCOPY, ESOPHAGOSCOPY,  BIOPSY, COMBINED  5/20/2013    Procedure: COMBINED LARYNGOSCOPY, ESOPHAGOSCOPY,  BIOPSY;  Direct Laryngoscopy , Esophagoscopy;  Surgeon: Tawanda Bryson MD;  Location: [facility] OR     LUMPECTOMY BREAST BILATERAL       ORTHOPEDIC SURGERY      bunionectomy     TONSILLECTOMY       Family History   Problem Relation Age of Onset     Cerebrovascular Disease Mother      Diabetes Father      Diabetes Maternal Grandfather      C.A.D. Maternal Grandfather      Myocardial Infarction Maternal Grandfather      Alcohol/Drug Maternal Grandfather      Cerebrovascular Disease Paternal Grandmother      Social History     Socioeconomic History     Marital status:      Spouse name: Cj   Occupational History     Occupation:      Comment: lost job due to RSD   Tobacco Use     Smoking status: Never     Smokeless tobacco: Never   Substance and Sexual Activity     Alcohol use: Not Currently     Drug use: No   Social History Narrative    Children    Alhaji - birth child      --  to Agatha - drug issues      -- drug issues      --         Mendez - stepchild      --  to Katalina      --  5/2015        Dianne -  stepchild      --  to Jf      -- children - 3yo, pregnant with #2 (5/2015)      ROS: 10 point ROS neg other than the  symptoms noted above in the HPI.  Physical Exam  Vitals and nursing note reviewed. Exam conducted with a chaperone present.   Musculoskeletal:         General: No swelling, deformity or signs of injury.      Right lower leg: No edema.      Left lower leg: Tenderness present. No deformity, lacerations or bony tenderness. No edema.   Skin:     General: Skin is warm and dry.         A/P:    1.  Reasonable to continue current regimen vi her PCP  2.  Continue intermittent UDS  3.  Patient may return for re-evaluation if her condition changes

## 2023-06-28 NOTE — PROGRESS NOTES
Assessment & Plan     Chronic, continuous use of opioids  Chronic pain syndrome  Complex regional pain syndrome type 1 of left lower extremity  New patient to me who previously followed with Dr. Mcelroy.  On long-term chronic opioids at stable dose with significant improvement in functional status.  Completed recent pain management consult visit who agreed with the current plan and had no additional new therapies or suggestions to offer, so I will continue managing going forward.  Discussed that if she is otherwise doing well, likely can follow-up every other month.  We will review health system guidelines to determine if virtual visit is an option/what the required frequency of in person visit is for opioid fills.  - Urine Drugs of Abuse Screen Panel 13  - Urine Drugs of Abuse Screen Panel 13  - oxyCODONE (ROXICODONE) 5 MG tablet  Dispense: 340 tablet; Refill: 0    Insomnia   On zolpidem for years.  Has discussed risks of this medication and opioids at length with Dr. Mcelroy, full understanding of risks and is at stable dose    Herpes simplex virus infection  Has trialed other antivirals and this is the one that works best for HSV suppression  - valGANciclovir (VALCYTE) 450 MG tablet  Dispense: 90 tablet; Refill: 3      Prescription drug management  15 minutes spent by me on the date of the encounter doing chart review, history and exam, documentation and further activities per the note      Return in about 2 months (around 8/29/2023) for Follow up, with me.    Karin Ellis, [facility] JHONATAN Stephen is a 67 year old, presenting for the following health issues:  RECHECK        4/28/2023    10:47 AM   Additional Questions   Roomed by Sandhya   Accompanied by Self     HPI     Valacyclovir gets a sore on the left buttock   Stopped taking it zulay day, on Jan 25 got a sore      Pain History:  Have you seen this provider for your pain in the past?   Yes   Where in your body do  you have pain? Left leg primarily   Are you seeing anyone else for your pain? No        4/13/2020    10:49 AM 1/3/2022    10:50 AM 12/26/2022    11:26 AM   PHQ-9 SCORE   PHQ-9 Total Score 3 0 2           5/25/2021    11:15 AM 7/5/2022    10:36 AM 6/29/2023    10:44 AM   EDUAR-7 SCORE   Total Score 0 1 0               7/5/2022    10:33 AM 10/31/2022    10:04 AM 12/26/2022    11:01 AM   PEG Score   PEG Total Score 4 3 2.67       Chronic Pain Follow Up:    Location of pain: L leg   Analgesia/pain control:    - Recent changes:  None     - Overall control: Comfortably manageable    - Current treatments: oxycodone 5 tabs/day, not on a schedule per se but ends up being about the same time every day    Adherence:     - Do you ever take more pain medicine than prescribed? No    - When did you take your last dose of pain medicine?  Took around 930a, after pool therapy   Adverse effects: No   PDMP Review       Value Time User    State PDMP site checked  Yes 6/29/2023 11:43 AM Karin Ellis DO        Last CSA Agreement:   CSA -- Patient Level:     [Media Unavailable] Controlled Substance Agreement - Opioid - Scan on 11/10/2022  8:48 AM   [Media Unavailable] Controlled Substance Agreement - Opioid - Scan on 10/19/2022  2:55 PM   [Media Unavailable] Controlled Substance Agreement - Opioid - Scan on 6/4/2021 12:34 PM       Last UDS: 6/29/2023        Patient Active Problem List   Diagnosis     Sural neuritis     Saphenous neuritis     Abnormality of gait     Herpes simplex     Reflex sympathetic dystrophy of lower extremity     Insomnia     Plantar fasciitis     Menopausal syndrome (hot flashes)     Thyroid nodule     Anxiety     Hyperlipidemia with target LDL less than 130     Complex regional pain syndrome type 1 of left lower extremity     Gastroesophageal reflux disease without esophagitis     Chronic pain syndrome     Thyroid function test abnormal     Neuralgia, neuritis or radiculitis     Chronic, continuous use of  opioids        Review of Systems   Pertinent positives and negatives per HPI.        Objective    /85   Pulse 78   Temp 98.7  F (37.1  C)   Resp 17   SpO2 97%   There is no height or weight on file to calculate BMI.  Physical Exam   GENERAL: alert, cooperative, in no acute distress  HEENT: sclera clear  PULM: normal respiratory effort   NEURO: alert and oriented, grossly intact, moves all extremities, normal gait   SKIN: no rashes or lesions visualized   PSYCH: euthymic affect

## 2023-06-29 ENCOUNTER — OFFICE VISIT (OUTPATIENT)
Dept: FAMILY MEDICINE | Facility: CLINIC | Age: 68
End: 2023-06-29
Payer: COMMERCIAL

## 2023-06-29 VITALS
DIASTOLIC BLOOD PRESSURE: 85 MMHG | OXYGEN SATURATION: 97 % | HEART RATE: 78 BPM | TEMPERATURE: 98.7 F | SYSTOLIC BLOOD PRESSURE: 126 MMHG | RESPIRATION RATE: 17 BRPM

## 2023-06-29 DIAGNOSIS — G90.522 COMPLEX REGIONAL PAIN SYNDROME TYPE 1 OF LEFT LOWER EXTREMITY: ICD-10-CM

## 2023-06-29 DIAGNOSIS — B00.9 HERPES SIMPLEX VIRUS INFECTION: ICD-10-CM

## 2023-06-29 DIAGNOSIS — G89.4 CHRONIC PAIN SYNDROME: ICD-10-CM

## 2023-06-29 DIAGNOSIS — G47.00 INSOMNIA, UNSPECIFIED TYPE: ICD-10-CM

## 2023-06-29 DIAGNOSIS — F11.90 CHRONIC, CONTINUOUS USE OF OPIOIDS: Primary | ICD-10-CM

## 2023-06-29 PROCEDURE — 80306 DRUG TEST PRSMV INSTRMNT: CPT | Performed by: STUDENT IN AN ORGANIZED HEALTH CARE EDUCATION/TRAINING PROGRAM

## 2023-06-29 PROCEDURE — 99214 OFFICE O/P EST MOD 30 MIN: CPT | Performed by: STUDENT IN AN ORGANIZED HEALTH CARE EDUCATION/TRAINING PROGRAM

## 2023-06-29 RX ORDER — OXYCODONE HYDROCHLORIDE 5 MG/1
5 TABLET ORAL EVERY 4 HOURS PRN
Qty: 340 TABLET | Refills: 0 | Status: SHIPPED | OUTPATIENT
Start: 2023-08-10 | End: 2023-08-31

## 2023-06-29 RX ORDER — VALGANCICLOVIR 450 MG/1
450 TABLET, FILM COATED ORAL DAILY
Qty: 90 TABLET | Refills: 3 | Status: SHIPPED | OUTPATIENT
Start: 2023-06-29 | End: 2024-04-25

## 2023-06-29 ASSESSMENT — ANXIETY QUESTIONNAIRES
1. FEELING NERVOUS, ANXIOUS, OR ON EDGE: NOT AT ALL
3. WORRYING TOO MUCH ABOUT DIFFERENT THINGS: NOT AT ALL
2. NOT BEING ABLE TO STOP OR CONTROL WORRYING: NOT AT ALL
4. TROUBLE RELAXING: NOT AT ALL
6. BECOMING EASILY ANNOYED OR IRRITABLE: NOT AT ALL
IF YOU CHECKED OFF ANY PROBLEMS ON THIS QUESTIONNAIRE, HOW DIFFICULT HAVE THESE PROBLEMS MADE IT FOR YOU TO DO YOUR WORK, TAKE CARE OF THINGS AT HOME, OR GET ALONG WITH OTHER PEOPLE: NOT DIFFICULT AT ALL
GAD7 TOTAL SCORE: 0
GAD7 TOTAL SCORE: 0
5. BEING SO RESTLESS THAT IT IS HARD TO SIT STILL: NOT AT ALL
7. FEELING AFRAID AS IF SOMETHING AWFUL MIGHT HAPPEN: NOT AT ALL

## 2023-07-11 NOTE — PATIENT INSTRUCTIONS
Great to see you today! Next visit I'll make sure I've looked up the virtual visit question to determine what our health system requires in terms of in person visit frequency.    Patient Education   Here is the plan from today's visit    1. Chronic, continuous use of opioids  - Urine Drugs of Abuse Screen Panel 13; Future  - Urine Drugs of Abuse Screen Panel 13  - oxyCODONE (ROXICODONE) 5 MG tablet; Take 1 tablet (5 mg) by mouth every 4 hours as needed for pain or moderate to severe pain (for 68 days per e-Scripts)  Dispense: 340 tablet; Refill: 0    2. Chronic pain syndrome  - Urine Drugs of Abuse Screen Panel 13; Future  - Urine Drugs of Abuse Screen Panel 13  - oxyCODONE (ROXICODONE) 5 MG tablet; Take 1 tablet (5 mg) by mouth every 4 hours as needed for pain or moderate to severe pain (for 68 days per e-Scripts)  Dispense: 340 tablet; Refill: 0    3. Herpes simplex virus infection  - valGANciclovir (VALCYTE) 450 MG tablet; Take 1 tablet (450 mg) by mouth daily  Dispense: 90 tablet; Refill: 3    4. Complex regional pain syndrome type 1 of left lower extremity  - oxyCODONE (ROXICODONE) 5 MG tablet; Take 1 tablet (5 mg) by mouth every 4 hours as needed for pain or moderate to severe pain (for 68 days per e-Scripts)  Dispense: 340 tablet; Refill: 0    5. Insomnia, unspecified type      Please call or return to clinic if your symptoms don't go away.    Follow up plan  Return in about 2 months (around 8/29/2023) for Follow up, with me.    Thank you for coming to [facility] today.  Lab Testing:  **If you had lab testing today and your results are reassuring or normal they will be mailed to you or sent through [employer] within 7 days.   **If the lab tests need quick action we will call you with the results.  **If you are having labs done on a different day, please call [phone] to schedule at [facility] or [phone] for other [facility] locations. Labs do not offer walk-in  appointments.  The phone number we will call with results is # [phone] (home) . If this is not the best number please call our clinic and change the number.  Medication Refills:  If you need any refills please call your pharmacy and they will contact us.   If you need to  your refill at a new pharmacy, please contact the new pharmacy directly. The new pharmacy will help you get your medications transferred faster.   Scheduling:  If you have any concerns about today's visit or wish to schedule another appointment please call our office during normal business hours [phone] (8-5:00 M-F). If you can no longer make a scheduled visit, please cancel via [employer] or call us to cancel.   If a referral was made to an [facility] specialty provider and you do not get a call from central scheduling, please refer to directions on your visit summary or call our office during normal business hours for assistance.   If a Mammogram was ordered for you at the [facility] call [phone] to schedule or change your appointment.  If you had an XRay/CT/Ultrasound/MRI ordered the number is [phone] to schedule or change your radiology appointment.   [facility] has limited ultrasound appointments available on Wednesdays, if you would like your ultrasound at [facility], please call [phone] to schedule.   Medical Concerns:  If you have urgent medical concerns please call [phone] at any time of the day.    Karin Ellis,

## 2023-08-30 NOTE — PROGRESS NOTES
Assessment & Plan     Chronic, continuous use of opioids  Chronic pain syndrome  Complex regional pain syndrome type 1 of left lower extremity  Left lower extremity pain well controlled on current, stable regimen of oxycodone 5 mg max 5/day.  PEG score updated.  Needs follow-up every 3 months per system protocol.  Discussed changing prescriptions to every 30 days and I can send 3 prescriptions at the time for her pharmacy to keep on file to try and simplify medication regimen.  - Due for updated controlled substance agreement in November  - Urine Drugs of Abuse Screen Panel 13  - Urine Drugs of Abuse Screen Panel 13  - oxyCODONE (ROXICODONE) 5 MG tablet  Dispense: 150 tablet; Refill: 0    Post-menopausal  History multiple UTIs   Has experienced 3 UTIs related to unclean water at pool therapy.  Offered vaginal estrogen as an option for prevention to allow her to better engage with supportive modalities for her chronic pain management.  - estradiol (ESTRACE) 0.1 MG/GM vaginal cream  Dispense: 42.5 g; Refill: 4    Skin lesion   Looks c/w AK. Will look with dermatoscope next visit and offer cryo vs excision     HCM  We will follow-up with [facility] for COVID booster, flu shot, and see if they have RSV in stock    Ordering of each unique test  Prescription drug management  32 minutes spent by me on the date of the encounter doing chart review, history and exam, documentation and further activities per the note       Return in about 3 months (around 11/30/2023).    Karin Ellis DO  [facility] JHONATAN Stephen is a 67 year old, presenting for the following health issues:  Recheck Medication        8/31/2023    10:26 AM   Additional Questions   Roomed by owen   Accompanied by self       HPI     Spot L forehead - there maybe months   Not itchy, just there. Usually doesn't notice it     +++++++++  Saturday - takes shoes off to go in daughter's house  [place] like a pine needle in her shoe - R  instep,   Feels it whens he puts her sheo on or pushes on it     Pain History:  Have you seen this provider for your pain in the past? Yes   Where in your body do you have pain? L leg   Are you seeing anyone else for your pain? No        4/13/2020    10:49 AM 1/3/2022    10:50 AM 12/26/2022    11:26 AM   PHQ-9 SCORE   PHQ-9 Total Score 3 0 2           7/5/2022    10:36 AM 6/29/2023    10:44 AM 8/31/2023    10:27 AM   EDUAR-7 SCORE   Total Score 1 0 0           7/5/2022    10:33 AM 10/31/2022    10:04 AM 12/26/2022    11:01 AM   PEG Score   PEG Total Score 4 3 2.67   PEG: A Three-Item Scale Assessing Pain Intensity and Interference    What number best describes your PAIN ON AVERAGE in the past week? 2    What number best describes how, during the past week, pain has interfered with your ENJOYMENT OF LIFE? 0 - Does not interfere    What number best describes how, during the past week, pain has interfered with your GENERAL ACTIVITY? 5    PEG Total Score: 2.33    Hanh CAMPOS, Joe KA, Amadeo MJ, Berta TA, Arreola J, Paris JM, Kedar SM, Andrez K. Development and initial validation of the PEG, a 3-item scale assessing pain intensity and interference. Journal of General Internal Medicine. 2009 Wally;24:733-738.     Chronic Pain Follow Up:    Location of pain: L leg   Analgesia/pain control:    - Recent changes:  none. Exactly the same   - Overall control: Tolerable with discomfort    - Current treatments: meds. Pool therapy - hasn't gone lately bc cloudy pool. Gets UTI - has happened 3x in the pool.   Adherence:     - Do you ever take more pain medicine than prescribed? No    - When did you take your last dose of pain medicine?  This morning    Adverse effects: No   PDMP Review         Value Time User    State PDMP site checked  Yes 8/31/2023 11:13 AM Karin Ellis DO          Last CSA Agreement:   CSA -- Patient Level:     [Media Unavailable] Controlled Substance Agreement - Opioid - Scan on 11/10/2022  8:48 AM   [Media  Unavailable] Controlled Substance Agreement - Opioid - Scan on 10/19/2022  2:55 PM   [Media Unavailable] Controlled Substance Agreement - Opioid - Scan on 6/4/2021 12:34 PM       Last UDS: 8/31/2023      Review of Systems   Pertinent positives and negatives per HPI.        Objective    /81   Pulse 78   Temp 98.3  F (36.8  C)   Resp 18   SpO2 100%   There is no height or weight on file to calculate BMI.  Physical Exam   GENERAL: alert, cooperative, in no acute distress  HEENT: sclera clear  PULM: normal respiratory effort   NEURO: alert and oriented, grossly intact, moves all extremities, normal gait   SKIN: raised scaly 6mm flat papule over L lateral forehead, mild erythema   PSYCH: euthymic affect

## 2023-08-31 ENCOUNTER — OFFICE VISIT (OUTPATIENT)
Dept: FAMILY MEDICINE | Facility: CLINIC | Age: 68
End: 2023-08-31
Payer: COMMERCIAL

## 2023-08-31 VITALS
SYSTOLIC BLOOD PRESSURE: 117 MMHG | TEMPERATURE: 98.3 F | OXYGEN SATURATION: 100 % | DIASTOLIC BLOOD PRESSURE: 81 MMHG | HEART RATE: 78 BPM | RESPIRATION RATE: 18 BRPM

## 2023-08-31 DIAGNOSIS — Z23 NEED FOR TDAP VACCINATION: ICD-10-CM

## 2023-08-31 DIAGNOSIS — Z78.0 POST-MENOPAUSAL: ICD-10-CM

## 2023-08-31 DIAGNOSIS — F11.90 CHRONIC, CONTINUOUS USE OF OPIOIDS: Primary | ICD-10-CM

## 2023-08-31 DIAGNOSIS — G89.4 CHRONIC PAIN SYNDROME: ICD-10-CM

## 2023-08-31 DIAGNOSIS — G90.522 COMPLEX REGIONAL PAIN SYNDROME TYPE 1 OF LEFT LOWER EXTREMITY: ICD-10-CM

## 2023-08-31 PROCEDURE — 99214 OFFICE O/P EST MOD 30 MIN: CPT | Mod: 25 | Performed by: STUDENT IN AN ORGANIZED HEALTH CARE EDUCATION/TRAINING PROGRAM

## 2023-08-31 PROCEDURE — 90715 TDAP VACCINE 7 YRS/> IM: CPT | Performed by: STUDENT IN AN ORGANIZED HEALTH CARE EDUCATION/TRAINING PROGRAM

## 2023-08-31 PROCEDURE — 90472 IMMUNIZATION ADMIN EACH ADD: CPT | Performed by: STUDENT IN AN ORGANIZED HEALTH CARE EDUCATION/TRAINING PROGRAM

## 2023-08-31 PROCEDURE — 80306 DRUG TEST PRSMV INSTRMNT: CPT | Performed by: STUDENT IN AN ORGANIZED HEALTH CARE EDUCATION/TRAINING PROGRAM

## 2023-08-31 PROCEDURE — G0009 ADMIN PNEUMOCOCCAL VACCINE: HCPCS | Performed by: STUDENT IN AN ORGANIZED HEALTH CARE EDUCATION/TRAINING PROGRAM

## 2023-08-31 PROCEDURE — 90677 PCV20 VACCINE IM: CPT | Performed by: STUDENT IN AN ORGANIZED HEALTH CARE EDUCATION/TRAINING PROGRAM

## 2023-08-31 RX ORDER — ESTRADIOL 0.1 MG/G
CREAM VAGINAL
Qty: 42.5 G | Refills: 4 | Status: SHIPPED | OUTPATIENT
Start: 2023-08-31 | End: 2024-04-25

## 2023-08-31 RX ORDER — OXYCODONE HYDROCHLORIDE 5 MG/1
5 TABLET ORAL EVERY 4 HOURS PRN
Qty: 150 TABLET | Refills: 0 | Status: SHIPPED | OUTPATIENT
Start: 2023-11-06 | End: 2024-02-06

## 2023-08-31 RX ORDER — MODERNA COVID-19 VACCINE, BIVALENT 25; 25 UG/.5ML; UG/.5ML
INJECTION, SUSPENSION INTRAMUSCULAR
COMMUNITY
Start: 2022-10-15 | End: 2023-11-16

## 2023-08-31 ASSESSMENT — ANXIETY QUESTIONNAIRES
IF YOU CHECKED OFF ANY PROBLEMS ON THIS QUESTIONNAIRE, HOW DIFFICULT HAVE THESE PROBLEMS MADE IT FOR YOU TO DO YOUR WORK, TAKE CARE OF THINGS AT HOME, OR GET ALONG WITH OTHER PEOPLE: NOT DIFFICULT AT ALL
GAD7 TOTAL SCORE: 0
4. TROUBLE RELAXING: NOT AT ALL
3. WORRYING TOO MUCH ABOUT DIFFERENT THINGS: NOT AT ALL
6. BECOMING EASILY ANNOYED OR IRRITABLE: NOT AT ALL
7. FEELING AFRAID AS IF SOMETHING AWFUL MIGHT HAPPEN: NOT AT ALL
GAD7 TOTAL SCORE: 0
2. NOT BEING ABLE TO STOP OR CONTROL WORRYING: NOT AT ALL
5. BEING SO RESTLESS THAT IT IS HARD TO SIT STILL: NOT AT ALL
1. FEELING NERVOUS, ANXIOUS, OR ON EDGE: NOT AT ALL

## 2023-08-31 ASSESSMENT — PAIN SCALES - PAIN ENJOYMENT GENERAL ACTIVITY SCALE (PEG)
INTERFERED_GENERAL_ACTIVITY: 5
PEG_TOTALSCORE: 2.33
AVG_PAIN_PASTWEEK: 2
INTERFERED_ENJOYMENT_LIFE: 0 - DOES NOT INTERFERE

## 2023-08-31 NOTE — PATIENT INSTRUCTIONS
Oxycodone -- we will do 30d scripts and I can send 3 at a time so we are seeing each other every 3 mos    Vaginal estrogen to prevent UTI     [facility] for COVID booster, flu shot, and RSV vaccine if they have it       Patient Education   Here is the plan from today's visit    1. Chronic, continuous use of opioids  - Urine Drugs of Abuse Screen Panel 13; Future  - Urine Drugs of Abuse Screen Panel 13    2. Post-menopausal  - estradiol (ESTRACE) 0.1 MG/GM vaginal cream; Place 0.5 g of cream intravaginally administered daily for 2 weeks, then reduce to 0.5mg of cream twice a week  Dispense: 42.5 g; Refill: 4    3. Need for Tdap vaccination    4. Chronic pain syndrome  5. Complex regional pain syndrome type 1 of left lower extremity            Please call or return to clinic if your symptoms don't go away.    Follow up plan  Return in about 3 months (around 11/30/2023).    Thank you for coming to [facility] today.  Lab Testing:  **If you had lab testing today and your results are reassuring or normal they will be mailed to you or sent through [employer] within 7 days.   **If the lab tests need quick action we will call you with the results.  **If you are having labs done on a different day, please call [phone] to schedule at [facility] or [phone] for other [facility] locations. Labs do not offer walk-in appointments.  The phone number we will call with results is # [phone] (home) . If this is not the best number please call our clinic and change the number.  Medication Refills:  If you need any refills please call your pharmacy and they will contact us.   If you need to  your refill at a new pharmacy, please contact the new pharmacy directly. The new pharmacy will help you get your medications transferred faster.   Scheduling:  If you have any concerns about today's visit or wish to schedule another appointment please call our office during normal business hours  [phone] (8-5:00 M-F). If you can no longer make a scheduled visit, please cancel via [employer] or call us to cancel.   If a referral was made to an [facility] specialty provider and you do not get a call from central scheduling, please refer to directions on your visit summary or call our office during normal business hours for assistance.   If a Mammogram was ordered for you at the [facility] call [phone] to schedule or change your appointment.  If you had an XRay/CT/Ultrasound/MRI ordered the number is [phone] to schedule or change your radiology appointment.   [facility] has limited ultrasound appointments available on Wednesdays, if you would like your ultrasound at [facility], please call [phone] to schedule.   Medical Concerns:  If you have urgent medical concerns please call [phone] at any time of the day.    Karin Ellis, DO

## 2023-10-10 NOTE — PROGRESS NOTES
"      HPI:       Hailee Mayo is a 61 year old who presents for the following  Patient presents with:  Refill Request: pain meds     60y/o F with h/o chronic regional pain syndrome presents for 3 month f/u visit with no complaints or exacerbation of symptoms. She mentioned recently recovering from a 1wk URI that disproportionately involved the left side of her throat as has been the case in similar transient illnesses in the past. Pt mentioned that her right leg is beginning to show some signs of neuropathy similar to what she experiences at baseline with the left leg, but the right leg symptoms are relatively mild. Pt reports most recent fall 3 weeks ago at home without notable trauma. Pt describes left leg paresthesias as burning, tender, and exquisitely sensitive to light touch (e.g. bed linen) and must sleep with left leg uncovered and extending off bed. Pt has received flu shot this season and has no other concerns.    PMHx:  - mortons neuroma 4th toe    FHx:  - grandfather  \"heart problems\"  - grandmother CHF  - mother aortic valve repair scheduled    SgHx:  - plantar fasciitis repair associated with current left leg symptoms (surg 6 years ago)  - bilateral lumpectomy  - bilat bunionectomy  - tonsilectomy  - tubes cauterized    ScHx:  - house w/   - Avoids stairs  - grandchildren 2x/wk  - enthusiastic about water aerobics    Chronic Pain Follow-Up Visit     Location of pain: left leg  Analgesia/pain control:         Recent changes:  same        Overall control:Comfortably manageable    Care Plan    Chronic Pain Care Plan completed Yes    Are you able to follow the care plan? Yes    Activity level/function:    What does the pain stop you from doing? Work, climb multiple flights  Functional Assessment  Questionnaire -5  FUNCTIONAL ASSESSMENT QUESTIONNAIRE SCORE 3/13/2017 2017   Total Score 55 60        Current morphine equivalents/day = 37.5 mg    Naloxone WILL NOT be prescribed.  Adverse " effects: No, BM well-managed w/ Bio-X4    Adherence    How often do you take extra pain medicine Never    Did you take your pain medication today? YES    Home Exercise? 4-5 days/week for an average of 45-60 minutes  Other treatments         Counseling ?  no -          Physical therapy? No but does pool therapy    Risk Factors:      Sleep:  Good      Mood/anxiety:  improved      Recent family or social stressors:  Currently feeling relieved from recent past stressors (e.g. Son drug rehab)      Other risks: in-home stairs limit access. minimal variety in social/active lifestyle     Database checked today? Yes. Details: fully compliant to agreements    Urine toxicology testing: positive for oxycodone as expected        Opioid Risk Tool Score:  OPIOID RISK TOOL TOTAL SCORE 8/14/2017   Total Score 3      Total Score Risk Category  0 - 3 = Low Risk  of future problems with Opioid           Adherence and Exercise  Medication side effects: no  How often is a medication missed? Never  Exercise: aerobic water 4-5 days/week for an average of 45-60 minutes          Problem, Medication and Allergy Lists were reviewed and are current.  Patient is an established patient of this clinic.         Review of Systems:   Review of Systems   Constitutional: Negative for chills and fever.   HENT: Negative for congestion, hearing loss, rhinorrhea, sinus pain and tinnitus.    Eyes: Negative for visual disturbance.   Respiratory: Positive for chest tightness (nonconcerning occasional).    Gastrointestinal: Positive for constipation (supplement bio X4 by [employer]). Negative for diarrhea, nausea and vomiting.   Endocrine: Positive for heat intolerance. Negative for cold intolerance.   Genitourinary: Negative for dysuria.   Musculoskeletal: Gait problem: occasional atraumatic falls, toes often cold.   Hematological: Bruises/bleeds easily.   Psychiatric/Behavioral: Positive for dysphoric mood (effect of social stressors improved w/ time. pt  endorses full recovery from depressed mood) and sleep disturbance.   FAQ5 score 55 stable          Physical Exam:   Patient Vitals for the past 24 hrs:   BP Temp Temp src Pulse Resp SpO2 Weight   11/06/17 1106 116/78 97.6  F (36.4  C) Oral 63 18 98 % -     There is no height or weight on file to calculate BMI.  Vitals were reviewed and were normal     Physical Exam  Gen: alert and oriented x3, appears well-perfused in no distress  HEENT: bilateral TM nonerythematous, nares patent, no oral/throat lesions or exudate  CV: RRR, normal S1/S2, no murmur  Resp: bilateral clear lung sounds, normal wall expansion, nonlabored  MSK: full strength and ROM, bilateral plantar surface calluses, half moon distribution of medial left foot paresthesia  Neuro: CN II-XII grossly intact, left leg/foot tenderness w/ burning and inconsistent numbness via monofilament      Results:     Results for orders placed or performed in visit on 11/06/17   Rapid Urine Drug Screen ([facility])   Result Value Ref Range    Amphetamines Qual NEGATIVE NEGATIVE    Barbiturates Qual Urine NEGATIVE NEGATIVE    Buprenorphine Qual Urine NEGATIVE NEGATIVE    Benzodiazepine Qual Urine NEGATIVE NEGATIVE    Cocaine Qual Urine NEGATIVE NEGATIVE    Cannabinoids Qual Urine NEGATIVE NEGATIVE    Methamphetamine Qual NEGATIVE NEGATIVE    Methadone Qual NEGATIVE NEGATIVE    Morphine Qual NEGATIVE NEGATIVE    Oxycodone Qual POSITIVE (A) NEGATIVE    Temperature of Urine was Between  Degrees F YES YES       Assessment and Plan   60y/o F with h/o chronic regional pain syndrome presents for 3month f/u appt with no exacerbation of symptoms.    1.Chronic pain syndrome  2. Complex regional pain syndrome type 1 of left lower extremity Chronic leg pain  - schedule refill oxycodone 5mg  - continue gabapentin 600mg  - encourage regular foot checks    - Rapid Urine Drug Screen ([facility])  - oxyCODONE IR (ROXICODONE) 5 MG tablet; Take 1 tablet (5 mg) by mouth every 4 hours as  needed for moderate to severe pain  Dispense: 150 tablet; Refill: 0      2. Sleep disturbance/anxiety  - improved symptoms of depression  - refill zolpidem 10mg  - continue sertraline 50mg      This note is scribed by Bryn Petty, MS3 , medical student on behalf of KALEB ESCAMILLA      Options for treatment and follow-up care were reviewed with the patient. Hailee Mayo  engaged in the decision making process and verbalized understanding of the options discussed and agreed with the final plan.    Kaleb Escamilla MD    The medical student acted as a scribe and the encounter documented above was performed completely by me and the documentation accurately reflects the work I have performed today. Kaleb Escamilla MD       Never

## 2023-11-07 ENCOUNTER — TELEPHONE (OUTPATIENT)
Dept: FAMILY MEDICINE | Facility: CLINIC | Age: 68
End: 2023-11-07
Payer: COMMERCIAL

## 2023-11-07 NOTE — TELEPHONE ENCOUNTER
[facility] phone call message- medication clarification/question:    Full Medication Name:     -oxyCODONE (ROXICODONE) 5 MG tablet      -zolpidem (AMBIEN) 10 MG tablet   -gabapentin (NEURONTIN) 600 MG tablet       Question: The pharmacist called to report a possible drug interaction while taking these medications at the same time. Some of the interactions are:    -Depression  -Heavy sedation  -Possible coma     The pharmacist would like a call back to discuss next steps.    Pharmacy confirmed as      [facility] - [place], [place] - [address] [place] [address]: Yes    OK to leave a message on voice mail? Yes    Primary language: English      needed? No    Call taken on November 7, 2023 at 3:33 PM by Coni Kerr

## 2023-11-08 NOTE — TELEPHONE ENCOUNTER
"RN attempted to call [facility] back. Did not get through kept getting message \"We're sorry, your call cannot be completed at this time, please hang up and try your call again later\"     Effie House RN  "

## 2023-11-08 NOTE — TELEPHONE ENCOUNTER
RN called number from pt chart and talked with pharmacist. RN relayed provider message and pharmacist stated nothing has changed they were just notifying of potential side effects of the 3 medications. Pharmacist noted the account and will send scripts.    Effie House RN

## 2023-11-15 NOTE — PROGRESS NOTES
Assessment & Plan     Chronic, continuous use of opioids  Chronic pain syndrome  Complex regional pain syndrome type 1 of left lower extremity  Good functional status and well controlled on current regimen which has been stable for years. Annual controlled subtance agreement updated today. PDMP reviewed. Again reviewed risks and comorbidities of her multiple sedating medications. She is aware and currently experiencing no adverse affects.   - should have oxycodone script on file dated 11/6  - Urine Drugs of Abuse Screen Panel 13  - gabapentin (NEURONTIN) 600 MG tablet  Dispense: 540 tablet; Refill: 3    Need for vaccination against respiratory syncytial virus  Reviewed, will get at the pharmacy     Visit for screening mammogram  - MA SCREENING DIGITAL BILAT - Future  (s+30)    Hyperlipidemia LDL goal <130  Refill   - simvastatin (ZOCOR) 20 MG tablet  Dispense: 90 tablet; Refill: 3    Insomnia, unspecified type  Longstanding insomnia and sleep cycle problems since career as a . Ambien effective for her symptoms.   - zolpidem (AMBIEN) 10 MG tablet  Dispense: 90 tablet; Refill: 1    Post-menopausal  Discussed DEXA scan recs, will order at next visit post-holidays.       Ordering of each unique test  Prescription drug management  20 minutes spent by me on the date of the encounter doing chart review, history and exam, documentation and further activities per the note       Return in about 2 months (around 1/16/2024) for Follow up, with me.    Karin Ellis, [facility] JHONATAN Stephen is a 67 year old, presenting for the following health issues:  Follow Up      HPI       Pain History:  Have you seen this provider for your pain in the past? Yes   Where in your body do you have pain? LLE   Are you seeing anyone else for your pain? No        4/13/2020    10:49 AM 1/3/2022    10:50 AM 12/26/2022    11:26 AM   PHQ-9 SCORE   PHQ-9 Total Score 3 0 2           7/5/2022     CHRONIC DISEASE MANAGEMENT  [facility] [address] Sims is a 69 year old male, patient of Dr. Aaron Carpenter MD, who is presenting for chronic disease management.  He was last seen here on 10/25/22 by Dr. Jordan for follow up on high blood pressures. I last saw the patient for a MWV on 8/11/22.    Chronic Problems Reviewed:    #HTN  - taking metoprolol and amlodipine as prescribed  - recently stopped taking hydrochlorothiazide, lisinopril, spironolactone due to DC and hyperkalemia, which have since resolved  - states he drinks about 4 bottles of water per day  - denies headaches, cp, sob, palpitations, vision changes  - At home blood pressure readings from 10/26-11/7:  10/26 @ 8AM 135/88 P55, sugar 100  10/27 @ 8:30AM 150/88 P56, sugar 114; @6PM 135/85 P57  10/28 @ 9:30AM 139/87 P 70; @6PM 142/85 P60  10/29 @ 9AM 123/85 P59, sugar 107; @6PM 125/81 P 62  10/30 @ 8:30AM 139/87 P58, sugar 101; @6PM 1136/82 P 65  10/31 @ 8:30 /86 P59, sugar 90  11/1 @ 8 /92 P61, sugar 107, @ 5PM 139/83 P58  11/2 @ 9AM 145/87 P57, sugar 108  11/3 @ 8:30AM 129/86 P62, sugar 105, @6PM 140/89 P62  11/4 @ 7:30AM 129/87 P58, sugar 103; @6PM 144/87 P57  11/7 @ 8:30AM 142/88 P56, sugar 106    #HLD  - still taking atorvastatin 40 mg daily    #NIDDM  - still taking glipizide, empagliflozin, and metformin as prescribed  - sugars well-controlled as above    #BPH  #Urinary retention  - visited ED twice in 9/2022 for urinary retention and had tijerina catheter placed  - first seen by urology NP, Eve Matta, for tijerina catheter removal and PVR; was started on tamsulosin at that time  - since started seeing urologist, Dr Dunham with [facility]  - patient reports MIGUEL concerning for prostate nodule so MRI prostate ordered  - notes he is no longer having urinary retention      Review of Systems:    10 systems were reviewed and were negative except where noted above.        Histories:    Problem List:  2022-10:  10:36 AM 6/29/2023    10:44 AM 8/31/2023    10:27 AM   EDUAR-7 SCORE   Total Score 1 0 0       Chronic Pain Follow Up:    Location of pain: LLE  Analgesia/pain control:    - Recent changes:  no    - Overall control: Tolerable with discomfort    - Current treatments: oxycodone, gabapentin, supportive modalities like pool home PT exercises   Adherence:     - Do you ever take more pain medicine than prescribed? No -- if she has a day where she needs a 10mg dose she will compensate a subsequent day to not run out early. Rarely this happens. Never takes more than 10mg in a dose and that's quite infrequent.     Adverse effects: No. Current bowel regimen working well.   PDMP Review         Value Time User    State PDMP site checked  Yes 8/31/2023 11:13 AM Karin Ellis DO          Last CSA Agreement:   CSA -- Patient Level:     [Media Unavailable] Controlled Substance Agreement - Opioid - Scan on 11/17/2023 10:05 AM   [Media Unavailable] Controlled Substance Agreement - Opioid - Scan on 11/10/2022  8:48 AM   [Media Unavailable] Controlled Substance Agreement - Opioid - Scan on 10/19/2022  2:55 PM   [Media Unavailable] Controlled Substance Agreement - Opioid - Scan on 6/4/2021 12:34 PM       Last UDS: 8/31/2023      Review of Systems   Pertinent positives and negatives per HPI.        Objective    /83   Pulse 66   Resp 24   Wt 86.6 kg (191 lb)   SpO2 93%   BMI 32.79 kg/m    Body mass index is 32.79 kg/m .  Physical Exam   GENERAL: alert, cooperative, in no acute distress  HEENT: sclera clear  PULM: normal respiratory effort   NEURO: alert and oriented, grossly intact, moves all extremities, normal gait   SKIN: no rashes or lesions visualized   PSYCH: euthymic affect           DC (acute kidney injury) (CMS/[facility])  2022-09: Stage 3a chronic kidney disease (CMS/[facility])  2021-11: Arthritis  2020-10: Elevated serum creatinine  2020-01: Chronic pain of left knee  2020-01: Health maintenance examination  2020-01: Medicare annual wellness visit, initial  2019-03: Sinus bradycardia  2019-03: Nonrheumatic aortic valve insufficiency  2018-12: Health care maintenance  2009-12: Hyperlipidemia  2007-09: Type 2 diabetes mellitus (CMS/[facility])  2007-09: Hypertension       Past Medical History:   Diagnosis Date   • Diabetes (CMS/[facility])    • Elevated serum creatinine 10/05/2020   • Health care maintenance 12/27/2018   • Health maintenance examination 01/20/2020   • Hyperlipidemia    • Hypertension    • Medicare annual wellness visit, initial 01/20/2020   • Type 2 diabetes mellitus (CMS/[facility]) 09/13/2007   • Urinary retention        No past surgical history on file.    Patient Care Team:  Aaron Carpenter MD as PCP - General (Student)  Paulo Cordero MD as Gastroenterologist (Gastroenterology)  Maxine Alvarado MD as Cardiologist (Cardiovascular Disease)  Joey Dunham MD (Urology)    [facility] pharmacy[facility]    [id number] [address] [address]  Phone: [phone] Fax: [phone]      Current Outpatient Medications   Medication Sig   • lisinopril (ZESTRIL) 10 MG tablet Take 1 tablet by mouth daily.   • tamsulosin (FLOMAX) 0.4 MG Cap Take 0.4 mg by mouth daily.   • metoPROLOL tartrate (LOPRESSOR) 100 MG tablet TAKE 1 TABLET BY MOUTH EVERY 12 HOURS   • empagliflozin (JARDIANCE) 25 MG tablet Take 1 tablet by mouth daily (before breakfast).   • glipiZIDE (GLUCOTROL) 10 MG tablet TAKE 1 TABLET BY MOUTH TWICE DAILY   • amLODIPine (NORVASC) 10 MG tablet Take 1 tablet by mouth daily.   • atorvastatin (LIPITOR) 40 MG tablet Take 1 tablet by mouth daily. As directed.   • metformin (GLUCOPHAGE) 1000 MG tablet Take 1 tablet  by mouth 2 times daily (with meals).   • Blood Glucose Monitoring Suppl (OneTouch Verio Reflect) w/Device Kit 1 Units daily.   • blood glucose test strip Test blood sugar one time daily as directed. Diagnosis: DM E11.9. Meter: One touch verio reflect   • Lancets (OneTouch Delica Plus [id number]) Misc 1 Device daily.   • aspirin (ECOTRIN) 81 MG EC tablet Take 81 mg by mouth daily.     No current facility-administered medications for this visit.     No Known Allergies    Social History     Socioeconomic History   • Marital status: /Civil Union     Spouse name: Not on file   • Number of children: Not on file   • Years of education: Not on file   • Highest education level: Not on file   Occupational History   • Not on file   Tobacco Use   • Smoking status: Never Smoker   • Smokeless tobacco: Never Used   Vaping Use   • Vaping Use: never used   Substance and Sexual Activity   • Alcohol use: Not Currently     Comment: stopped drinking regularly about 10 years ago; will have a beer rarely at social gatherings   • Drug use: No   • Sexual activity: Yes     Partners: Female   Other Topics Concern   • Not on file   Social History Narrative   • Not on file     Social Determinants of Health     Financial Resource Strain: Not on file   Food Insecurity: No Food Insecurity   • Social Determinants: Food Insecurity: Never   Transportation Needs: Not on file   Physical Activity: Not on file   Stress: Not on file   Social Connections: Not on file   Intimate Partner Violence: Not on file     Social History     Social History Narrative   • Not on file         OBJECTIVE     Vital Signs:    Temp: 96.9 °F (36.1 °C), Heart Rate: 61, BP: 132/78,  ,         Physical Examination:    Constitutional:  Awake and alert, NAD  Skin:  Warm and dry, no pallor, no jaundice  H/E/N/T:  AT/NC, no discharge from nares  Eyes:  Open, no scleral icterus, no conjunctival injection  Neck:  Supple, no tracheal tug  Cardiovascular:  RRR, no audible  M/R/G  Respiratory:  No distress, aerated throughout, no stridor, CTAB  Abdomen:  Non-distended, non-tender, no guarding or rebound  Extremities:  No pedal edema, no deformities  Neurologic:  Alert, speech clear & sensible, face symmetric, moves all extremities.  No obvious memory impairment.  Psychologic:  Pleasant and cooperative.      Relevant Results:     Latest Reference Range & Units 10/19/22 13:20   Sodium 135 - 145 mmol/L 137   Potassium 3.4 - 5.1 mmol/L 4.4   Chloride 97 - 110 mmol/L 104   CO2 21 - 32 mmol/L 27   ANION GAP 7 - 19 mmol/L 10   Glucose 70 - 99 mg/dL 75   BUN 6 - 20 mg/dL 13   Creatinine 0.67 - 1.17 mg/dL 0.89   BUN/CREATININE RATIO 7 - 25  15   Glomerular Filtration Rate >=60  >90   CALCIUM 8.4 - 10.2 mg/dL 9.4   MAGNESIUM 1.7 - 2.4 mg/dL 1.7   PHOSPHORUS 2.4 - 4.7 mg/dL 2.9         ASSESSMENT  &  PLAN     #BPH  #Urinary retention  #DC, likely secondary to urinary retention  #Hyperkalemia  - previously held ASA, hydrochlorothiazide, lisinopril, and spironolactone due to DC and hyperkalemia  - most recent BMP unremarkable  Plan:  - OK to restart ASA given resolution of DC  - repeat BMP in 2-3 weeks, prior to follow up appointment in 4 weeks  - continue tamsulosin as prescribed    #HTN  - blood pressure log as in HPI; mostly in the 130-140s/80-90s  - recently stopped blood pressure meds as above  Plan:  - restart lisinopril at lower dose of 10 mg daily  - daily blood pressure log  - continue metoprolol and amlodipine as prescribed    #HLD  - continue atorvastatin as prescribed    #NIDDM  - continue taking metformin, glipizide, and empagliflozin as prescribed  - repeat A1c today    Preferred pharmacy:   [id number] [address]  [address] [facility] [address]  Phone: [phone] Fax: [phone]        Follow-up: 4 weeks  At next appointment follow up blood pressure after restarting lisinopril and follow up repeat BMP and  A1c          Signed,  Aaron Carpenter MD  Family Medicine

## 2023-11-16 ENCOUNTER — OFFICE VISIT (OUTPATIENT)
Dept: FAMILY MEDICINE | Facility: CLINIC | Age: 68
End: 2023-11-16
Payer: COMMERCIAL

## 2023-11-16 VITALS
DIASTOLIC BLOOD PRESSURE: 83 MMHG | SYSTOLIC BLOOD PRESSURE: 121 MMHG | WEIGHT: 191 LBS | BODY MASS INDEX: 32.79 KG/M2 | OXYGEN SATURATION: 93 % | RESPIRATION RATE: 24 BRPM | HEART RATE: 66 BPM

## 2023-11-16 DIAGNOSIS — F11.90 CHRONIC, CONTINUOUS USE OF OPIOIDS: Primary | ICD-10-CM

## 2023-11-16 DIAGNOSIS — G47.00 INSOMNIA, UNSPECIFIED TYPE: ICD-10-CM

## 2023-11-16 DIAGNOSIS — Z12.31 VISIT FOR SCREENING MAMMOGRAM: ICD-10-CM

## 2023-11-16 DIAGNOSIS — Z29.11 NEED FOR VACCINATION AGAINST RESPIRATORY SYNCYTIAL VIRUS: ICD-10-CM

## 2023-11-16 DIAGNOSIS — G89.4 CHRONIC PAIN SYNDROME: ICD-10-CM

## 2023-11-16 DIAGNOSIS — E78.5 HYPERLIPIDEMIA LDL GOAL <130: ICD-10-CM

## 2023-11-16 DIAGNOSIS — Z78.0 POST-MENOPAUSAL: ICD-10-CM

## 2023-11-16 DIAGNOSIS — G90.522 COMPLEX REGIONAL PAIN SYNDROME TYPE 1 OF LEFT LOWER EXTREMITY: ICD-10-CM

## 2023-11-16 PROCEDURE — 99214 OFFICE O/P EST MOD 30 MIN: CPT | Performed by: STUDENT IN AN ORGANIZED HEALTH CARE EDUCATION/TRAINING PROGRAM

## 2023-11-16 RX ORDER — RESPIRATORY SYNCYTIAL VIRUS VACCINE 120MCG/0.5
0.5 KIT INTRAMUSCULAR ONCE
Qty: 1 EACH | Refills: 0 | Status: CANCELLED | OUTPATIENT
Start: 2023-11-16 | End: 2023-11-16

## 2023-11-16 RX ORDER — OXYCODONE HYDROCHLORIDE 5 MG/1
5 TABLET ORAL EVERY 4 HOURS PRN
Qty: 150 TABLET | Refills: 0 | Status: CANCELLED | OUTPATIENT
Start: 2023-11-16

## 2023-11-16 NOTE — LETTER
Opioid / Opioid Plus Controlled Substance Agreement    This is an agreement between you and your provider about the safe and appropriate use of controlled substance/opioids prescribed by your care team. Controlled substances are medicines that can cause physical and mental dependence (abuse).    There are strict laws about having and using these medicines. We here at [facility] are committing to working with you in your efforts to get better. To support you in this work, we ll help you schedule regular office appointments for medicine refills. If we must cancel or change your appointment for any reason, we ll make sure you have enough medicine to last until your next appointment.     As a Provider, I will:  Listen carefully to your concerns and treat you with respect.   Recommend a treatment plan that I believe is in your best interest. This plan may involve therapies other than opioid pain medication.   Talk with you often about the possible benefits, and the risk of harm of any medicine that we prescribe for you.   Provide a plan on how to taper (discontinue or go off) using this medicine if the decision is made to stop its use.    As a Patient, I understand that opioid(s):   Are a controlled substance prescribed by my care team to help me function or work and manage my condition(s).   Are strong medicines and can cause serious side effects such as:  Drowsiness, which can seriously affect my driving ability  A lower breathing rate, enough to cause death  Harm to my thinking ability   Depression   Abuse of and addiction to this medicine  Need to be taken exactly as prescribed. Combining opioids with certain medicines or chemicals (such as illegal drugs, sedatives, sleeping pills, and benzodiazepines) can be dangerous or even fatal. If I stop opioids suddenly, I may have severe withdrawal symptoms.  Do not work for all types of pain nor for all patients. If they re not helpful, I may be asked to stop  them.        The risks, benefits and side effects of these medicine(s) were explained to me. I agree that:  I will take part in other treatments as advised by my care team. This may be psychiatry or counseling, physical therapy, behavioral therapy, group treatment or a referral to a specialist.     I will keep all my appointments. I understand that this is part of the monitoring of opioids. My care team may require an office visit for EVERY opioid/controlled substance refill. If I miss appointments or don t follow instructions, my care team may stop my medicine.    I will take my medicines as prescribed. I will not change the dose or schedule unless my care team tells me to. There will be no refills if I run out early.     I may be asked to come to the clinic and complete a urine drug test or complete a pill count at any time. If I don t give a urine sample or participate in a pill count, the care team may stop my medicine.    I will only receive prescriptions from this clinic for chronic pain. If I am treated by another provider for acute pain issues, I will tell them that I am taking opioid pain medication for chronic pain and that I have a treatment agreement with this provider. I will inform my [facility] care team within one business day if I am given a prescription for any pain medication by another healthcare provider. My [facility] care team can contact other providers and pharmacists about my use of any medicines.    It is up to me to make sure that I don t run out of my medicines on weekends or holidays. If my care team is willing to refill my opioid prescription without a visit, I must request refills only during office hours. Refills may take up to 3 business days to process. I will use one pharmacy to fill all my opioid and other controlled substance prescriptions. I will notify the clinic about any changes to my insurance or medication availability.    I am responsible for my  prescriptions. If the medicine/prescription is lost, stolen or destroyed, it will not be replaced. I also agree not to share controlled substance medicines with anyone.    I am aware I should not use any illegal or recreational drugs. I agree not to drink alcohol unless my care team says I can.       If I enroll in the [place] [facility] Cannabis program, I will tell my care team prior to my next refill.     I will tell my care team right away if I become pregnant, have a new medical problem treated outside of my regular clinic, or have a change in my medications.    I understand that this medicine can affect my thinking, judgment and reaction time. Alcohol and drugs affect the brain and body, which can affect the safety of my driving. Being under the influence of alcohol or drugs can affect my decision-making, behaviors, personal safety, and the safety of others. Driving while impaired (DWI) can occur if a person is driving, operating, or in physical control of a car, motorcycle, boat, snowmobile, ATV, motorbike, off-road vehicle, or any other motor vehicle ([place] Statute 169A.20). I understand the risk if I choose to drive or operate any vehicle or machinery.    I understand that if I do not follow any of the conditions above, my prescriptions or treatment may be stopped or changed.          Opioids  What You Need to Know    What are opioids?   Opioids are pain medicines that must be prescribed by a doctor. They are also known as narcotics.     Examples are:   morphine (MS Contin, Jennyfer)  oxycodone (Oxycontin)  oxycodone and acetaminophen (Percocet)  hydrocodone and acetaminophen (Vicodin, Norco)   fentanyl patch (Duragesic)   hydromorphone (Dilaudid)   methadone  codeine (Tylenol #3)     What do opioids do well?   Opioids are best for severe short-term pain such as after a surgery or injury. They may work well for cancer pain. They may help some people with long-lasting (chronic) pain.     What do opioids NOT do  well?   Opioids never get rid of pain entirely, and they don t work well for most patients with chronic pain. Opioids don t reduce swelling, one of the causes of pain.                                    Other ways to manage chronic pain and improve function include:     Treat the health problem that may be causing pain  Anti-inflammation medicines, which reduce swelling and tenderness, such as ibuprofen (Advil, Motrin) or naproxen (Aleve)  Acetaminophen (Tylenol)  Antidepressants and anti-seizure medicines, especially for nerve pain  Topical treatments such as patches or creams  Injections or nerve blocks  Chiropractic or osteopathic treatment  Acupuncture, massage, deep breathing, meditation, visual imagery, aromatherapy  Use heat or ice at the pain site  Physical therapy   Exercise  Stop smoking  Take part in therapy       Risks and side effects     Talk to your doctor before you start or decide to keep taking opioids. Possible side effects include:    Lowering your breathing rate enough to cause death  Overdose, including death, especially if taking higher than prescribed doses  Worse depression symptoms; less pleasure in things you usually enjoy  Feeling tired or sluggish  Slower thoughts or cloudy thinking  Being more sensitive to pain over time; pain is harder to control  Trouble sleeping or restless sleep  Changes in hormone levels (for example, less testosterone)  Changes in sex drive or ability to have sex  Constipation  Unsafe driving  Itching and sweating  Dizziness  Nausea, throwing up and dry mouth    What else should I know about opioids?    Opioids may lead to dependence, tolerance, or addiction.    Dependence means that if you stop or reduce the medicine too quickly, you will have withdrawal symptoms. These include loose poop (diarrhea), jitters, flu-like symptoms, nervousness and tremors. Dependence is not the same as addiction.                     Tolerance means needing higher doses over time to  get the same effect. This may increase the chance of serious side effects.    Addiction is when people improperly use a substance that harms their body, their mind or their relations with others. Use of opiates can cause a relapse of addiction if you have a history of drug or alcohol abuse.    People who have used opioids for a long time may have a lower quality of life, worse depression, higher levels of pain and more visits to doctors.    You can overdose on opioids. Take these steps to lower your risk of overdose:    Recognize the signs:  Signs of overdose include decrease or loss of consciousness (blackout), slowed breathing, trouble waking up and blue lips. If someone is worried about overdose, they should call 911.    Talk to your doctor about Narcan (naloxone).   If you are at risk for overdose, you may be given a prescription for Narcan. This medicine very quickly reverses the effects of opioids.   If you overdose, a friend or family member can give you Narcan while waiting for the ambulance. They need to know the signs of overdose and how to give Narcan.     Don't use alcohol or street drugs.   Taking them with opioids can cause death.    Do not take any of these medicines unless your doctor says it s OK. Taking these with opioids can cause death:  Benzodiazepines, such as lorazepam (Ativan), alprazolam (Xanax) or diazepam (Valium)  Muscle relaxers, such as cyclobenzaprine (Flexeril)  Sleeping pills like zolpidem (Ambien)   Other opioids      How to keep you and other people safe while taking opioids:    Never share your opioids with others.  Opioid medicines are regulated by the Drug Enforcement Agency (NUVIA). Selling or sharing medications is a criminal act.    2. Be sure to store opioids in a secure place, locked up if possible. Young children can easily swallow them and overdose.    3. When you are traveling with your medicines, keep them in the original bottles. If you use a pill box, be sure you also  carry a copy of your medicine list from your clinic or pharmacy.    4. Safe disposal of opioids    Most pharmacies have places to get rid of medicine, called disposal kiosks. Medicine disposal options are also available in every [facility]. Search your county and  medication disposal  to find more options. You can find more details at:  [email or web address]     I agree that my provider, clinic care team, and pharmacy may work with any city, state or federal law enforcement agency that investigates the misuse, sale, or other diversion of my controlled medicine. I will allow my provider to discuss my care with, or share a copy of, this agreement with any other treating provider, pharmacy or emergency room where I receive care.    I have read this agreement and have asked questions about anything I did not understand.    _______________________________________________________  Patient Signature - Hailee Mayo _____________________                   Date     _______________________________________________________  Provider Signature - Karin Ellis DO   _____________________                   Date     _______________________________________________________  Witness Signature (required if provider not present while patient signing)   _____________________                   Date

## 2023-11-22 RX ORDER — ZOLPIDEM TARTRATE 10 MG/1
10 TABLET ORAL
Qty: 90 TABLET | Refills: 1 | Status: SHIPPED | OUTPATIENT
Start: 2023-11-22 | End: 2024-04-25

## 2023-11-22 RX ORDER — GABAPENTIN 600 MG/1
1200 TABLET ORAL 3 TIMES DAILY
Qty: 540 TABLET | Refills: 3 | Status: SHIPPED | OUTPATIENT
Start: 2023-11-22 | End: 2024-07-09

## 2023-11-22 RX ORDER — SIMVASTATIN 20 MG
20 TABLET ORAL AT BEDTIME
Qty: 90 TABLET | Refills: 3 | Status: SHIPPED | OUTPATIENT
Start: 2023-11-22 | End: 2024-07-09

## 2023-11-22 NOTE — PATIENT INSTRUCTIONS
Patient Education   Here is the plan from today's visit    1. Chronic, continuous use of opioids  You should have an oxycodone script on file w/ the pharmacy -- let me know if not!   - Urine Drugs of Abuse Screen Panel 13; Future    2. Complex regional pain syndrome type 1 of left lower extremity  - gabapentin (NEURONTIN) 600 MG tablet; Take 2 tablets (1,200 mg) by mouth 3 times daily  Dispense: 540 tablet; Refill: 3    3. Chronic pain syndrome    4. Need for vaccination against respiratory syncytial virus  Should be able to get this through Medicare at any pharmacy!     5. Visit for screening mammogram  - MA SCREENING DIGITAL BILAT - Future  (s+30); Future    6. Hyperlipidemia LDL goal <130  - simvastatin (ZOCOR) 20 MG tablet; Take 1 tablet (20 mg) by mouth at bedtime  Dispense: 90 tablet; Refill: 3    7. Insomnia, unspecified type  - zolpidem (AMBIEN) 10 MG tablet; Take 1 tablet (10 mg) by mouth nightly as needed for sleep  Dispense: 90 tablet; Refill: 1    8. Post-menopausal  Plan on ordering DEXA scan at next visit      Please call or return to clinic if your symptoms don't go away.    Follow up plan  Return in about 2 months (around 1/16/2024) for Follow up, with me.    Thank you for coming to [facility] today.  Lab Testing:  **If you had lab testing today and your results are reassuring or normal they will be mailed to you or sent through [facility] within 7 days.   **If the lab tests need quick action we will call you with the results.  **If you are having labs done on a different day, please call [phone] to schedule at [facility] or [phone] for other [facility] locations. Labs do not offer walk-in appointments.  The phone number we will call with results is # [phone] (home) . If this is not the best number please call our clinic and change the number.  Medication Refills:  If you need any refills please call your pharmacy and they will contact us.   If you need to pick  up your refill at a new pharmacy, please contact the new pharmacy directly. The new pharmacy will help you get your medications transferred faster.   Scheduling:  If you have any concerns about today's visit or wish to schedule another appointment please call our office during normal business hours [phone] (8-5:00 M-F). If you can no longer make a scheduled visit, please cancel via [employer] or call us to cancel.   If a referral was made to an [facility] specialty provider and you do not get a call from central scheduling, please refer to directions on your visit summary or call our office during normal business hours for assistance.   If a Mammogram was ordered for you at the [facility] call [phone] to schedule or change your appointment.  If you had an XRay/CT/Ultrasound/MRI ordered the number is [phone] to schedule or change your radiology appointment.   [facility] has limited ultrasound appointments available on Wednesdays, if you would like your ultrasound at [facility], please call [phone] to schedule.   Medical Concerns:  If you have urgent medical concerns please call [phone] at any time of the day.    Karin Ellis,

## 2023-12-11 ENCOUNTER — TRANSFERRED RECORDS (OUTPATIENT)
Dept: HEALTH INFORMATION MANAGEMENT | Facility: CLINIC | Age: 68
End: 2023-12-11
Payer: COMMERCIAL

## 2024-02-05 NOTE — PROGRESS NOTES
"  Assessment & Plan     Chronic, continuous use of opioids  Chronic pain syndrome  Complex regional pain syndrome type 1 of left lower extremity   On chronic opioids, longstanding stable dose. Continuing to work well for multimodal pain mgmt to improve her functional status. No new or bothersome SE. Has naloxone spray at home.   - Urine Drugs of Abuse Screen Panel 13  - Refill oxycodone prescription: 5mg tablet q4hr PRN for severe pain  - Follow up in 3 months    Post-menopausal status  - DEXA scan ordered as previously discussed        Review of the result(s) of each unique test - UDS  Ordering of each unique test  Prescription drug management      See Patient Instructions        Subjective   Hailee is a 68 year old, presenting for the following health issues:  Medication Follow-up    HPI   Hailee is here today for a medication follow-up concerning her CRPS and chronic opiod use. Her pain first began 12 years ago following a plantar fasciitis surgery, after which she experienced constant and severe left lower extremity pain. She was diagnosed with CRPS type 1 of the LLE. Ultimately, her pain is best controlled with her current regimen of 5mg oxycodone q4hr PRN. She has been stable on this regimen for the past 10 years. Today, she has no new complaints. Her pain is comfortably and tolerably managed with her current regimen. She has not noticed any adverse effects.     Hailee does feel forgetful on occasion, she states she has trouble with word finding over the past few years. She describes that her short term memory is slightly impaired, she has trouble recalling the details of the day prior. She is unsure if this is related to her medication or just \"getting old,\" as she has noticed this gradually occur over the last few years. She does not have trouble recalling events/details from \"many years ago.\" She doesn't think that her forgetfulness/trouble with word finding impacts her daily life.    She stays active with " water aerobics 3x per week, where she exercises and socializes. She is  and lives in [place] with her . Her mother is 91, traveling around [place].     Answers submitted by the patient for this visit:  Patient Health Questionnaire (Submitted on 2/6/2024)  If you checked off any problems, how difficult have these problems made it for you to do your work, take care of things at home, or get along with other people?: Not difficult at all  PHQ9 TOTAL SCORE: 0      Pain History:  When did you first notice your pain? ~10 years ago  Have you seen this provider for your pain in the past? Yes   Where in your body do you have pain? L leg  Are you seeing anyone else for your pain? No          7/5/2022    10:36 AM 6/29/2023    10:44 AM 8/31/2023    10:27 AM   EDUAR-7 SCORE   Total Score 1 0 0           1/3/2022    10:50 AM 12/26/2022    11:26 AM 2/6/2024    10:08 AM   PHQ-9 SCORE   PHQ-9 Total Score MyChart   0   PHQ-9 Total Score 0 2 0           7/5/2022    10:36 AM 6/29/2023    10:44 AM 8/31/2023    10:27 AM   EDUAR-7 SCORE   Total Score 1 0 0           12/26/2022    11:01 AM 11/16/2023    10:20 AM 2/6/2024    10:19 AM   PEG Score   PEG Total Score 2.67 5 2       Chronic Pain Follow Up:    Location of pain: Left leg, ankle, and foot  Analgesia/pain control:    - Recent changes:  None    - Overall control: Comfortably manageable    - Current treatments: Oxycodone 5mg q4hr PRN for severe pain    Pool 3 days a week instead of 5     Adherence:     - Do you ever take more pain medicine than prescribed? Yes: She occasionally takes one extra pill at a time, but no more than one extra. She will then account for this and take one less pill on a different day.   - When did you take your last dose of pain medicine? 4am this morning (2/6/24)     Adverse effects: None      PDMP Review         Value Time User    State PDMP site checked  Yes 8/31/2023 11:13 AM Karin Ellis DO Last CSA Agreement:   CSA -- Patient  "Level:     [Media Unavailable] Controlled Substance Agreement - Opioid - Scan on 11/17/2023 10:05 AM   [Media Unavailable] Controlled Substance Agreement - Opioid - Scan on 11/10/2022  8:48 AM   [Media Unavailable] Controlled Substance Agreement - Opioid - Scan on 10/19/2022  2:55 PM   [Media Unavailable] Controlled Substance Agreement - Opioid - Scan on 6/4/2021 12:34 PM       Last UDS: 8/31/2023 8/14/2017     2:00 PM   OPIOID RISK TOOL TOTAL SCORE   Total Score 3     45 MME/Day     Low Risk (0-3)  Moderate Risk (4-7)  High Risk (>8)      2/6/2024    11:05 AM   RIOSORD Total Score   RIOSORD Total Score 9     Chose \"yes\" for benzodiazepines though technically pt is taking z-drug not a true benzo -- feel this more accurately reflects her polypharmacy risk     Average probability of overdose or serious opioid-induced respiratory depression in the next six months:   Points    Risk   0-4    2%   5-7    5%   8-9    7%   10-17    15%   18-25    30%   26-41    55%   42    83%              Objective    /88   Pulse 90   Temp 97.9  F (36.6  C) (Oral)   Resp 18   Ht 1.626 m (5' 4\")   SpO2 95%   BMI 32.79 kg/m    Body mass index is 32.79 kg/m .  Physical Exam  Constitutional:       General: She is not in acute distress.     Appearance: She is not ill-appearing.   HENT:      Head: Normocephalic and atraumatic.   Cardiovascular:      Rate and Rhythm: Normal rate and regular rhythm.      Heart sounds: Normal heart sounds.   Pulmonary:      Effort: Pulmonary effort is normal.      Breath sounds: Normal breath sounds.   Musculoskeletal:         General: No swelling or deformity.   Skin:     General: Skin is warm and dry.   Neurological:      General: No focal deficit present.      Mental Status: She is alert and oriented to person, place, and time.   Psychiatric:         Mood and Affect: Mood normal.         Behavior: Behavior normal.         Thought Content: Thought content normal.         Judgment: Judgment " normal.            Results for orders placed or performed in visit on 02/06/24 (from the past 24 hour(s))   Urine Drugs of Abuse Screen Panel 13   Result Value Ref Range    Cannabinoids (79-maz-2-carboxy-9-THC) Not Detected Not Detected, Indeterminate    Phencyclidine Not Detected Not Detected, Indeterminate    Cocaine (Benzoylecgonine) Not Detected Not Detected, Indeterminate    Methamphetamine (d-Methamphetamine) Not Detected Not Detected, Indeterminate    Opiates (Morphine) Not Detected Not Detected, Indeterminate    Amphetamine (d-Amphetamine) Not Detected Not Detected, Indeterminate    Benzodiazepines (Nordiazepam) Not Detected Not Detected, Indeterminate    Tricyclic Antidepressants (Desipramine) Not Detected Not Detected, Indeterminate    Methadone Not Detected Not Detected, Indeterminate    Barbiturates (Butalbital) Not Detected Not Detected, Indeterminate    Oxycodone Detected (A) Not Detected, Indeterminate    Buprenorphine Not Detected Not Detected, Indeterminate         Dunia Magallon, MS3    I was present with the medical student who participated in the service and in the documentation of this note. I have verified the history and personally performed the physical exam and medical decision making, and have verified the content of the note, which accurately reflects my assessment of the patient and the plan of care.   Karin Ellis DO   Signed Electronically by: Karin Ellis DO

## 2024-02-06 ENCOUNTER — OFFICE VISIT (OUTPATIENT)
Dept: FAMILY MEDICINE | Facility: CLINIC | Age: 69
End: 2024-02-06
Payer: COMMERCIAL

## 2024-02-06 VITALS
BODY MASS INDEX: 32.79 KG/M2 | HEART RATE: 90 BPM | SYSTOLIC BLOOD PRESSURE: 127 MMHG | DIASTOLIC BLOOD PRESSURE: 88 MMHG | RESPIRATION RATE: 18 BRPM | HEIGHT: 64 IN | OXYGEN SATURATION: 95 % | TEMPERATURE: 97.9 F

## 2024-02-06 DIAGNOSIS — G89.4 CHRONIC PAIN SYNDROME: ICD-10-CM

## 2024-02-06 DIAGNOSIS — G90.522 COMPLEX REGIONAL PAIN SYNDROME TYPE 1 OF LEFT LOWER EXTREMITY: ICD-10-CM

## 2024-02-06 DIAGNOSIS — F11.90 CHRONIC, CONTINUOUS USE OF OPIOIDS: Primary | ICD-10-CM

## 2024-02-06 DIAGNOSIS — Z78.0 POST-MENOPAUSAL: ICD-10-CM

## 2024-02-06 LAB
AMPHETAMINES UR QL: NOT DETECTED
BARBITURATES UR QL SCN: NOT DETECTED
BENZODIAZ UR QL SCN: NOT DETECTED
BUPRENORPHINE UR QL: NOT DETECTED
CANNABINOIDS UR QL: NOT DETECTED
COCAINE UR QL SCN: NOT DETECTED
D-METHAMPHET UR QL: NOT DETECTED
METHADONE UR QL SCN: NOT DETECTED
OPIATES UR QL SCN: NOT DETECTED
OXYCODONE UR QL SCN: DETECTED
PCP UR QL SCN: NOT DETECTED
TRICYCLICS UR QL SCN: NOT DETECTED

## 2024-02-06 PROCEDURE — 80306 DRUG TEST PRSMV INSTRMNT: CPT | Mod: GA | Performed by: STUDENT IN AN ORGANIZED HEALTH CARE EDUCATION/TRAINING PROGRAM

## 2024-02-06 PROCEDURE — 99214 OFFICE O/P EST MOD 30 MIN: CPT | Performed by: STUDENT IN AN ORGANIZED HEALTH CARE EDUCATION/TRAINING PROGRAM

## 2024-02-06 RX ORDER — OXYCODONE HYDROCHLORIDE 5 MG/1
5 TABLET ORAL EVERY 4 HOURS PRN
Qty: 150 TABLET | Refills: 0 | Status: SHIPPED | OUTPATIENT
Start: 2024-04-06 | End: 2024-04-25

## 2024-02-06 RX ORDER — OXYCODONE HYDROCHLORIDE 5 MG/1
5 TABLET ORAL EVERY 4 HOURS PRN
Qty: 150 TABLET | Refills: 0 | Status: SHIPPED | OUTPATIENT
Start: 2024-02-06 | End: 2024-07-10

## 2024-02-06 RX ORDER — OXYCODONE HYDROCHLORIDE 5 MG/1
5 TABLET ORAL EVERY 4 HOURS PRN
Qty: 150 TABLET | Refills: 0 | Status: SHIPPED | OUTPATIENT
Start: 2024-03-06 | End: 2024-07-10

## 2024-02-06 ASSESSMENT — PATIENT HEALTH QUESTIONNAIRE - PHQ9
SUM OF ALL RESPONSES TO PHQ QUESTIONS 1-9: 0
10. IF YOU CHECKED OFF ANY PROBLEMS, HOW DIFFICULT HAVE THESE PROBLEMS MADE IT FOR YOU TO DO YOUR WORK, TAKE CARE OF THINGS AT HOME, OR GET ALONG WITH OTHER PEOPLE: NOT DIFFICULT AT ALL
SUM OF ALL RESPONSES TO PHQ QUESTIONS 1-9: 0

## 2024-02-08 ENCOUNTER — TELEPHONE (OUTPATIENT)
Dept: FAMILY MEDICINE | Facility: CLINIC | Age: 69
End: 2024-02-08
Payer: COMMERCIAL

## 2024-02-08 NOTE — TELEPHONE ENCOUNTER
[facility] phone call message - order or referral request for patient:     Order or referral being requested:   Pt has a referral for bone density imaging and would liek it sent to [facility]. Their number is [phone]      Additional Comments: Proc type: DEXA HIP/PELVIS/SPINE - Future     OK to leave a message on voice mail? Yes    Primary language: English      needed? No    Call taken on February 8, 2024 at 9:33 AM by Mack Burton

## 2024-02-13 ENCOUNTER — TRANSFERRED RECORDS (OUTPATIENT)
Dept: HEALTH INFORMATION MANAGEMENT | Facility: CLINIC | Age: 69
End: 2024-02-13
Payer: COMMERCIAL

## 2024-02-21 ENCOUNTER — TELEPHONE (OUTPATIENT)
Dept: FAMILY MEDICINE | Facility: CLINIC | Age: 69
End: 2024-02-21
Payer: COMMERCIAL

## 2024-02-21 NOTE — TELEPHONE ENCOUNTER
----- Message from Karin Ellis DO sent at 2/20/2024  4:32 PM CST -----  Please call patient with the following message:     Calling with results off Hailee's DEXA scan. The scan did show signs of bone thinning, and when the radiologist measured the bone density, it did meet fall within the threshold of osteoporosis. There is no emergency but I would love to make sure we discuss these findings and recommended labs and treatment options. Would be happy to see her sooner than her regular medication follow up if it works with her schedule.     Thanks,  Karin Ellis DO

## 2024-04-25 ENCOUNTER — OFFICE VISIT (OUTPATIENT)
Dept: FAMILY MEDICINE | Facility: CLINIC | Age: 69
End: 2024-04-25
Payer: COMMERCIAL

## 2024-04-25 VITALS
DIASTOLIC BLOOD PRESSURE: 86 MMHG | SYSTOLIC BLOOD PRESSURE: 124 MMHG | BODY MASS INDEX: 32.79 KG/M2 | OXYGEN SATURATION: 95 % | WEIGHT: 191 LBS | HEART RATE: 82 BPM | TEMPERATURE: 98.4 F

## 2024-04-25 DIAGNOSIS — G89.4 CHRONIC PAIN SYNDROME: ICD-10-CM

## 2024-04-25 DIAGNOSIS — G90.522 COMPLEX REGIONAL PAIN SYNDROME TYPE 1 OF LEFT LOWER EXTREMITY: ICD-10-CM

## 2024-04-25 DIAGNOSIS — B00.9 HERPES SIMPLEX VIRUS INFECTION: ICD-10-CM

## 2024-04-25 DIAGNOSIS — G47.00 INSOMNIA, UNSPECIFIED TYPE: ICD-10-CM

## 2024-04-25 DIAGNOSIS — F11.90 CHRONIC, CONTINUOUS USE OF OPIOIDS: ICD-10-CM

## 2024-04-25 DIAGNOSIS — Z13.1 DIABETES MELLITUS SCREENING: ICD-10-CM

## 2024-04-25 DIAGNOSIS — M85.80 OSTEOPENIA, UNSPECIFIED LOCATION: Primary | ICD-10-CM

## 2024-04-25 DIAGNOSIS — Z13.220 LIPID SCREENING: ICD-10-CM

## 2024-04-25 LAB
ANION GAP SERPL CALCULATED.3IONS-SCNC: 9 MMOL/L (ref 7–15)
BUN SERPL-MCNC: 13.7 MG/DL (ref 8–23)
CALCIUM SERPL-MCNC: 9.4 MG/DL (ref 8.8–10.2)
CHLORIDE SERPL-SCNC: 107 MMOL/L (ref 98–107)
CHOLEST SERPL-MCNC: 190 MG/DL
CREAT SERPL-MCNC: 0.87 MG/DL (ref 0.51–0.95)
DEPRECATED HCO3 PLAS-SCNC: 27 MMOL/L (ref 22–29)
EGFRCR SERPLBLD CKD-EPI 2021: 72 ML/MIN/1.73M2
FASTING STATUS PATIENT QL REPORTED: ABNORMAL
GLUCOSE SERPL-MCNC: 98 MG/DL (ref 70–99)
HBA1C MFR BLD: 5.9 % (ref 0–5.6)
HDLC SERPL-MCNC: 48 MG/DL
LDLC SERPL CALC-MCNC: 109 MG/DL
NONHDLC SERPL-MCNC: 142 MG/DL
POTASSIUM SERPL-SCNC: 5.2 MMOL/L (ref 3.4–5.3)
SODIUM SERPL-SCNC: 143 MMOL/L (ref 135–145)
TRIGL SERPL-MCNC: 163 MG/DL

## 2024-04-25 PROCEDURE — 80048 BASIC METABOLIC PNL TOTAL CA: CPT | Performed by: STUDENT IN AN ORGANIZED HEALTH CARE EDUCATION/TRAINING PROGRAM

## 2024-04-25 PROCEDURE — 99214 OFFICE O/P EST MOD 30 MIN: CPT | Performed by: STUDENT IN AN ORGANIZED HEALTH CARE EDUCATION/TRAINING PROGRAM

## 2024-04-25 PROCEDURE — 80061 LIPID PANEL: CPT | Performed by: STUDENT IN AN ORGANIZED HEALTH CARE EDUCATION/TRAINING PROGRAM

## 2024-04-25 PROCEDURE — 83036 HEMOGLOBIN GLYCOSYLATED A1C: CPT | Performed by: STUDENT IN AN ORGANIZED HEALTH CARE EDUCATION/TRAINING PROGRAM

## 2024-04-25 PROCEDURE — 36415 COLL VENOUS BLD VENIPUNCTURE: CPT | Performed by: STUDENT IN AN ORGANIZED HEALTH CARE EDUCATION/TRAINING PROGRAM

## 2024-04-25 RX ORDER — VALACYCLOVIR HYDROCHLORIDE 500 MG/1
500 TABLET, FILM COATED ORAL DAILY
Qty: 90 TABLET | Refills: 3 | Status: SHIPPED | OUTPATIENT
Start: 2024-04-25 | End: 2024-07-09

## 2024-04-25 RX ORDER — GABAPENTIN 600 MG/1
1200 TABLET ORAL 3 TIMES DAILY
Qty: 540 TABLET | Refills: 3 | Status: CANCELLED | OUTPATIENT
Start: 2024-04-25

## 2024-04-25 NOTE — LETTER
April 26, 2024      Hailee Maria Esther  [id number] [address] [place] [id number]        Hugo Stephen,    We are writing to inform you of your test results.    All results are stable. Normal kidneys and electrolytes. Cholesterol is mildly elevated but no concerning values.     Blood sugars (Hemoglobin A1c) are still in the prediabetes range but very stable - no increase. We can continue to monitor yearly.     Resulted Orders   Basic metabolic panel   Result Value Ref Range    Sodium 143 135 - 145 mmol/L      Comment:      Reference intervals for this test were updated on 09/26/2023 to more accurately reflect our healthy population. There may be differences in the flagging of prior results with similar values performed with this method. Interpretation of those prior results can be made in the context of the updated reference intervals.     Potassium 5.2 3.4 - 5.3 mmol/L    Chloride 107 98 - 107 mmol/L    Carbon Dioxide (CO2) 27 22 - 29 mmol/L    Anion Gap 9 7 - 15 mmol/L    Urea Nitrogen 13.7 8.0 - 23.0 mg/dL    Creatinine 0.87 0.51 - 0.95 mg/dL    GFR Estimate 72 >60 mL/min/1.73m2    Calcium 9.4 8.8 - 10.2 mg/dL    Glucose 98 70 - 99 mg/dL   Hemoglobin A1c   Result Value Ref Range    Hemoglobin A1C 5.9 (H) 0.0 - 5.6 %      Comment:      Normal <5.7%   Prediabetes 5.7-6.4%    Diabetes 6.5% or higher     Note: Adopted from ADA consensus guidelines.   Lipid panel reflex to direct LDL Non-fasting   Result Value Ref Range    Cholesterol 190 <200 mg/dL    Triglycerides 163 (H) <150 mg/dL    Direct Measure HDL 48 (L) >=50 mg/dL    LDL Cholesterol Calculated 109 (H) <=100 mg/dL    Non HDL Cholesterol 142 (H) <130 mg/dL    Patient Fasting > 8hrs? Unknown     Narrative    Cholesterol  Desirable:  <200 mg/dL    Triglycerides  Normal:  Less than 150 mg/dL  Borderline High:  150-199 mg/dL  High:  200-499 mg/dL  Very High:  Greater than or equal to 500 mg/dL    Direct Measure HDL  Female:  Greater than or equal to 50 mg/dL   Male:   Greater than or equal to 40 mg/dL    LDL Cholesterol  Desirable:  <100mg/dL  Above Desirable:  100-129 mg/dL   Borderline High:  130-159 mg/dL   High:  160-189 mg/dL   Very High:  >= 190 mg/dL    Non HDL Cholesterol  Desirable:  130 mg/dL  Above Desirable:  130-159 mg/dL  Borderline High:  160-189 mg/dL  High:  190-219 mg/dL  Very High:  Greater than or equal to 220 mg/dL       If you have any questions or concerns, please call the clinic at the number listed above.       Sincerely,      Karin Ellis DO

## 2024-04-25 NOTE — PROGRESS NOTES
Assessment & Plan     Chronic, continuous use of opioids  Chronic pain syndrome  Complex regional pain syndrome type 1 of left lower extremity  Longstanding stable oxycodone dose. Pain tolerable and allows improved functionality and enjoyment of activities. No changes. Q3mo visits.   - oxyCODONE (ROXICODONE) 5 MG tablet  Dispense: 150 tablet; Refill: 0    Insomnia, unspecified type  Longstanding stable dose. Has been extensively counseled on r/b/I. Will continue to explore options to decrease dose as able.  - zolpidem (AMBIEN) 10 MG tablet  Dispense: 90 tablet; Refill: 1    Osteopenia, unspecified location  Reviewed dx, recommended andrews + vitD supplementation, wt bearing exercise as tolerated    Diabetes mellitus screening  - Hemoglobin A1c  - Hemoglobin A1c    Lipid screening  - Lipid panel reflex to direct LDL Non-fasting  - Lipid panel reflex to direct LDL Non-fasting    Herpes simplex virus infection  Suppressive therapy  - valACYclovir (VALTREX) 500 MG tablet  Dispense: 90 tablet; Refill: 3      Ordering of each unique test  Prescription drug management  20 minutes spent by me on the date of the encounter doing chart review, history and exam, documentation and further activities per the note      Return in about 3 months (around 7/25/2024) for Follow up, with me.    Ashley Stephen is a 68 year old, presenting for the following health issues:  Medication Request (Pt wants to get a med refill )    HPI   [facility]   Medications of Risk:   Patient has Opioid in Med List  Patient has no Benzodiazepines on Med List Patient has no Antipsychotics on Med List  Number of medications on med list:    Med List Contains 10 or More Medications - Consider Medication Review and Reconciliation    Mentation  Patient has cognitive impairment diagnosis on Problem List  Patient does not have depression on Problem List    PHQ-2 Score:       2/6/2024    10:19 AM 2/22/2023    10:33 AM   PHQ-2 ( 1999 [employer])   Q1: Little  interest or pleasure in doing things 0 0   Q2: Feeling down, depressed or hopeless 0 0   PHQ-2 Score 0 0      PHQ9x3       1/3/2022    10:50 AM 12/26/2022    11:26 AM 2/6/2024    10:08 AM   PHQ-9 SCORE   PHQ-9 Total Score MyChart   0   PHQ-9 Total Score 0 2 0     Mini Cog:        No data to display                   Pain History:   Have you seen this provider for your pain in the past? Yes   Where in your body do you have pain? leg  Are you seeing anyone else for your pain? No        1/3/2022    10:50 AM 12/26/2022    11:26 AM 2/6/2024    10:08 AM   PHQ-9 SCORE   PHQ-9 Total Score MyChart   0   PHQ-9 Total Score 0 2 0           7/5/2022    10:36 AM 6/29/2023    10:44 AM 8/31/2023    10:27 AM   EDUAR-7 SCORE   Total Score 1 0 0               12/26/2022    11:01 AM 11/16/2023    10:20 AM 2/6/2024    10:19 AM   PEG Score   PEG Total Score 2.67 5 2       Chronic Pain Follow Up:    Still went to the pool without the heater. No further UTIs - just avoids if water cloudy. Isn't using vag estrogen.     Location of pain: lower extremity  Analgesia/pain control:    - Recent changes:  none    - Overall control: Tolerable with discomfort    - Current treatments: pool for activity regularly, oxycodone, gabapentin   Adherence:     - Do you ever take more pain medicine than prescribed? No    - When did you take your last dose of pain medicine?     Adverse effects: No   PDMP Review         Value Time User    State PDMP site checked  Yes 4/25/2024 11:03 AM Karin Ellis DO          Last CSA Agreement:   CSA -- Patient Level:     [Media Unavailable] Controlled Substance Agreement - Opioid - Scan on 11/17/2023 10:05 AM   [Media Unavailable] Controlled Substance Agreement - Opioid - Scan on 11/10/2022  8:48 AM   [Media Unavailable] Controlled Substance Agreement - Opioid - Scan on 10/19/2022  2:55 PM   [Media Unavailable] Controlled Substance Agreement - Opioid - Scan on 6/4/2021 12:34 PM       Last UDS: 2/6/2024 8/14/2017      2:00 PM   OPIOID RISK TOOL TOTAL SCORE   Total Score 3     Low Risk (0-3)  Moderate Risk (4-7)  High Risk (>8)        2/6/2024    10:54 AM 2/6/2024    11:05 AM   RIOSORD Total Score   RIOSORD Total Score 0 9     Average probability of overdose or serious opioid-induced respiratory depression in the next six months:   Points    Risk   0-4    2%   5-7    5%   8-9    7%   10-17    15%   18-25    30%   26-41    55%   42    83% and       2/6/2024    10:54 AM 2/6/2024    11:05 AM   Opioid Overdose Calculator (RIOSORD)   Substance use disorder or any kind, including alcohol or cannabis 0 0   Bipolar Disorder or Schizophrenia?  0 0   Stroke or other cerebrovascular disease?  0 0   Significant Chronic Kidney Disease? 0 0   Heart Failure?  0 0   Nonmalignant pancreatic disease?  0 0   Chronic Pulmonary Disease? 0 0   Chronic headache?  0 0   Does your patient take Fentanyl (transdermal or transmucosal)? 0 0   Does your patient take Morphone?  0 0   Does your patient take Methadone? 0 0   Does your patient take Hydromorphone? 0 0   Does your patient take extended release (ER) or long acting (LA) opioid? 0 0   Does your patient take a Benzodiazepine?  0 9   Does your patient take an antidepressant?  0 0   Is the patient's MME >100mg/day? 0 0   RIOSORD Total Score 0 9     Average probability of overdose or serious opioid-induced respiratory depression in the next six months:   Points    Risk   0-4    2%   5-7    5%   8-9    7%   10-17    15%   18-25    30%   26-41    55%   42    83%      Objective    /86 (BP Location: Left arm, Patient Position: Sitting, Cuff Size: Adult Regular)   Pulse 82   Temp 98.4  F (36.9  C) (Oral)   Wt 86.6 kg (191 lb)   SpO2 95%   BMI 32.79 kg/m    Body mass index is 32.79 kg/m .  Physical Exam   GENERAL: alert, cooperative, in no acute distress  HEENT: sclera clear  PULM: normal respiratory effort   NEURO: alert and oriented, grossly intact, moves all extremities, normal gait   SKIN: no  rashes or lesions visualized   PSYCH: euthymic affect      Signed Electronically by: Karin Ellis DO

## 2024-05-05 RX ORDER — OXYCODONE HYDROCHLORIDE 5 MG/1
5 TABLET ORAL EVERY 4 HOURS PRN
Qty: 150 TABLET | Refills: 0 | Status: SHIPPED | OUTPATIENT
Start: 2024-05-05 | End: 2024-07-10

## 2024-05-05 RX ORDER — ZOLPIDEM TARTRATE 10 MG/1
10 TABLET ORAL
Qty: 90 TABLET | Refills: 1 | Status: SHIPPED | OUTPATIENT
Start: 2024-05-05 | End: 2024-10-01

## 2024-07-08 NOTE — PROGRESS NOTES
"  Assessment & Plan     Complex regional pain syndrome type 1 of left lower extremity  Chronic pain syndrome  Chronic, continuous use of opioids  On oxycodone 5mg max 5 tabs per day, longstadning dose. On multiple sedating medications, well aware of risks nd currently no adverse SE. Meds continnue to help w/ function and enjoyment of life. Will continue at current dose no changes. Q3mo visits.   - oxyCODONE (ROXICODONE) 5 MG tablet  Dispense: 150 tablet; Refill: 0  - oxyCODONE (ROXICODONE) 5 MG tablet  Dispense: 150 tablet; Refill: 0  - gabapentin (NEURONTIN) 600 MG tablet  Dispense: 540 tablet; Refill: 3  - oxyCODONE (ROXICODONE) 5 MG tablet  Dispense: 150 tablet; Refill: 0  - oxyCODONE (ROXICODONE) 5 MG tablet  Dispense: 150 tablet; Refill: 0    Hyperlipidemia LDL goal <130  Refill   - simvastatin (ZOCOR) 20 MG tablet  Dispense: 90 tablet; Refill: 3    Insomnia, unspecified type  Hx shift work sleep disorder, on zolpidem    Herpes simplex virus infection  Suppressive  - valACYclovir (VALTREX) 500 MG tablet  Dispense: 90 tablet; Refill: 3        BMI  Estimated body mass index is 32.61 kg/m  as calculated from the following:    Height as of this encounter: 1.626 m (5' 4\").    Weight as of this encounter: 86.2 kg (190 lb).         Return in about 3 months (around 10/9/2024).    Ashley Stephen is a 68 year old, presenting for the following health issues:  Recheck Medication (Medical refill - patient goes to [facility] 3 times a week)    [facility]   [facility]      Matters  Code Status: No Order      Should address ACP next visit     Medications  Medications of Risk:   Patient has Opioid in Med List  Patient has no Benzodiazepines on Med List Patient has no Antipsychotics on Med List  Number of medications on med list:    Med List Contains 10 or More Medications - Consider Medication Review and Reconciliation    Mentation  Patient has cognitive impairment diagnosis on Problem List  Patient does not have " depression on Problem List    PHQ-2 Score:       7/9/2024    10:05 AM 2/6/2024    10:19 AM   PHQ-2 ( 1999 Pfizer)   Q1: Little interest or pleasure in doing things 0 0   Q2: Feeling down, depressed or hopeless 0 0   PHQ-2 Score 0 0   Q1: Little interest or pleasure in doing things Not at all    Q2: Feeling down, depressed or hopeless Not at all    PHQ-2 Score 0       PHQ9x3       1/3/2022    10:50 AM 12/26/2022    11:26 AM 2/6/2024    10:08 AM   PHQ-9 SCORE   PHQ-9 Total Score MyChart   0   PHQ-9 Total Score 0 2 0     Mini Cog:        No data to display                Mobility  Refreshable SmartLink to last Falls Risk assessment score and date:   No data recorded     Does Patient or Caregiver have mobility concerns for the patient?: no  Adaptive Equipment/DME that patient uses: No DME - used to use cane/walker but now more careful about shoes and watching     General Risk Score: 1   Values used to calculate this score:    Points  Metrics       1        Age: 68       0        Hospital Admissions: 0       0        ED Visits: 0       0        Has Chronic Obstructive Pulmonary Disease: No       0        Has Diabetes: No       0        Has Congestive Heart Failure: No       0        Has Liver Disease: No       0        Has Depression: No       0        Current PCP: Karin Ellis,        0        Has Medicaid: No     Patient Health status: Chronically ill: functionally independent, greater or equal than three chronic conditions    Pain History:         1/3/2022    10:50 AM 12/26/2022    11:26 AM 2/6/2024    10:08 AM   PHQ-9 SCORE   PHQ-9 Total Score MyChart   0   PHQ-9 Total Score 0 2 0           7/5/2022    10:36 AM 6/29/2023    10:44 AM 8/31/2023    10:27 AM   EDUAR-7 SCORE   Total Score 1 0 0              11/16/2023    10:20 AM 2/6/2024    10:19 AM 7/9/2024    10:23 AM   PEG Score   PEG Total Score 5 2 4.67       Chronic Pain Follow Up:    Location of pain: L leg   Analgesia/pain control:    - Recent changes:  none  "   - Overall control: Tolerable with discomfort    - Current treatments: oxycodone, gabapentin, Tylenol, pool therapy    Adherence:     - Do you ever take more pain medicine than prescribed? Yes - takes 2 when she gets home after she gets home after a long day     - When did you take your last dose of pain medicine?  yes   Adverse effects: No    Same word loss thing. In a conversation, ready to say the word, and right at that moment it goes away     PDMP Review         Value Time User    State PDMP site checked  Yes 7/10/2024  8:59 AM Karin Ellis DO          Last CSA Agreement:   CSA -- Patient Level:     [Media Unavailable] Controlled Substance Agreement - Opioid - Scan on 11/17/2023 10:05 AM   [Media Unavailable] Controlled Substance Agreement - Opioid - Scan on 11/10/2022  8:48 AM   [Media Unavailable] Controlled Substance Agreement - Opioid - Scan on 10/19/2022  2:55 PM   [Media Unavailable] Controlled Substance Agreement - Opioid - Scan on 6/4/2021 12:34 PM       Last UDS: 2/6/2024 8/14/2017     2:00 PM   OPIOID RISK TOOL TOTAL SCORE   Total Score 3     Low Risk (0-3)  Moderate Risk (4-7)  High Risk (>8)  Patient declined to complete RIOSORD today        Sleep:   Wakes up once a night, sometimes twice. Remembers when she wakes up. No nighttime amnesia, eating, etc          Objective    /82   Pulse 80   Temp 98.4  F (36.9  C) (Oral)   Resp 15   Ht 1.626 m (5' 4\")   Wt 86.2 kg (190 lb)   SpO2 95%   BMI 32.61 kg/m    Body mass index is 32.61 kg/m .  Physical Exam   GENERAL: alert, cooperative, in no acute distress  HEENT: sclera clear  PULM: normal respiratory effort   NEURO: alert and oriented, grossly intact, moves all extremities, normal gait   SKIN: no rashes or lesions visualized   PSYCH: euthymic affect      Signed Electronically by: Karin Ellis DO    "

## 2024-07-09 ENCOUNTER — OFFICE VISIT (OUTPATIENT)
Dept: FAMILY MEDICINE | Facility: CLINIC | Age: 69
End: 2024-07-09
Payer: COMMERCIAL

## 2024-07-09 VITALS
DIASTOLIC BLOOD PRESSURE: 82 MMHG | OXYGEN SATURATION: 95 % | BODY MASS INDEX: 32.44 KG/M2 | HEART RATE: 80 BPM | TEMPERATURE: 98.4 F | RESPIRATION RATE: 15 BRPM | HEIGHT: 64 IN | WEIGHT: 190 LBS | SYSTOLIC BLOOD PRESSURE: 115 MMHG

## 2024-07-09 DIAGNOSIS — G47.00 INSOMNIA, UNSPECIFIED TYPE: ICD-10-CM

## 2024-07-09 DIAGNOSIS — G90.522 COMPLEX REGIONAL PAIN SYNDROME TYPE 1 OF LEFT LOWER EXTREMITY: ICD-10-CM

## 2024-07-09 DIAGNOSIS — F11.90 CHRONIC, CONTINUOUS USE OF OPIOIDS: ICD-10-CM

## 2024-07-09 DIAGNOSIS — B00.9 HERPES SIMPLEX VIRUS INFECTION: ICD-10-CM

## 2024-07-09 DIAGNOSIS — E78.5 HYPERLIPIDEMIA LDL GOAL <130: ICD-10-CM

## 2024-07-09 DIAGNOSIS — G89.4 CHRONIC PAIN SYNDROME: ICD-10-CM

## 2024-07-09 PROCEDURE — 91320 SARSCV2 VAC 30MCG TRS-SUC IM: CPT | Performed by: STUDENT IN AN ORGANIZED HEALTH CARE EDUCATION/TRAINING PROGRAM

## 2024-07-09 PROCEDURE — 99214 OFFICE O/P EST MOD 30 MIN: CPT | Mod: 25 | Performed by: STUDENT IN AN ORGANIZED HEALTH CARE EDUCATION/TRAINING PROGRAM

## 2024-07-09 PROCEDURE — 90480 ADMN SARSCOV2 VAC 1/ONLY CMP: CPT | Performed by: STUDENT IN AN ORGANIZED HEALTH CARE EDUCATION/TRAINING PROGRAM

## 2024-07-09 RX ORDER — ZOLPIDEM TARTRATE 10 MG/1
10 TABLET ORAL
Qty: 90 TABLET | Refills: 1 | Status: CANCELLED | OUTPATIENT
Start: 2024-07-09

## 2024-07-09 ASSESSMENT — PAIN SCALES - GENERAL: PAINLEVEL: MILD PAIN (2)

## 2024-07-10 RX ORDER — OXYCODONE HYDROCHLORIDE 5 MG/1
5 TABLET ORAL EVERY 4 HOURS PRN
Qty: 150 TABLET | Refills: 0 | Status: SHIPPED | OUTPATIENT
Start: 2024-07-10

## 2024-07-10 RX ORDER — GABAPENTIN 600 MG/1
1200 TABLET ORAL 3 TIMES DAILY
Qty: 540 TABLET | Refills: 3 | Status: SHIPPED | OUTPATIENT
Start: 2024-07-10

## 2024-07-10 RX ORDER — VALACYCLOVIR HYDROCHLORIDE 500 MG/1
500 TABLET, FILM COATED ORAL DAILY
Qty: 90 TABLET | Refills: 3 | Status: SHIPPED | OUTPATIENT
Start: 2024-07-10

## 2024-07-10 RX ORDER — SIMVASTATIN 20 MG
20 TABLET ORAL AT BEDTIME
Qty: 90 TABLET | Refills: 3 | Status: SHIPPED | OUTPATIENT
Start: 2024-07-10

## 2024-07-10 RX ORDER — OXYCODONE HYDROCHLORIDE 5 MG/1
5 TABLET ORAL EVERY 4 HOURS PRN
Qty: 150 TABLET | Refills: 0 | Status: SHIPPED | OUTPATIENT
Start: 2024-08-10 | End: 2024-10-01

## 2024-09-12 ENCOUNTER — TELEPHONE (OUTPATIENT)
Dept: FAMILY MEDICINE | Facility: CLINIC | Age: 69
End: 2024-09-12
Payer: COMMERCIAL

## 2024-09-12 NOTE — TELEPHONE ENCOUNTER
[facility] phone call message- general phone call:    Reason for call: Patient states that she would need an appointment with  the first week of October to get this medication refilled. Offered an appointment with a different provider on team but patient wants to see  because she is not sure if another provider would give her a hard time. She wants to know if she can be seen that week by  or what are her suggestions.    Return call needed: Yes    OK to leave a message on voice mail? Yes    Primary language: English      needed? No    Call taken on September 12, 2024 at 10:29 AM by Ghazala Garcia

## 2024-09-13 NOTE — TELEPHONE ENCOUNTER
Attempted to call patient back with no answer. Unable to leave a voicemail. If patient calls back please schedule with any available provider for med refill. Per Dr. Ellis     It is okay to see another provider. You can reassure her we all work together on teams and once I know who she's scheduled with I can reach out to make sure the provider is aware of her treatment plan and how me (and Dr Mcelroy before me) have been managing to ensure continuity   Marla Arroyo RN

## 2024-09-13 NOTE — TELEPHONE ENCOUNTER
Second attempt to reach patient with no answer. Will ask  to reach out and schedule with any available provider for med refill.   Marla Arroyo RN

## 2024-10-01 ENCOUNTER — OFFICE VISIT (OUTPATIENT)
Dept: FAMILY MEDICINE | Facility: CLINIC | Age: 69
End: 2024-10-01
Payer: COMMERCIAL

## 2024-10-01 VITALS
TEMPERATURE: 98.1 F | OXYGEN SATURATION: 94 % | HEIGHT: 64 IN | RESPIRATION RATE: 16 BRPM | DIASTOLIC BLOOD PRESSURE: 84 MMHG | HEART RATE: 86 BPM | SYSTOLIC BLOOD PRESSURE: 117 MMHG | BODY MASS INDEX: 32.61 KG/M2

## 2024-10-01 DIAGNOSIS — G90.522 COMPLEX REGIONAL PAIN SYNDROME TYPE 1 OF LEFT LOWER EXTREMITY: ICD-10-CM

## 2024-10-01 DIAGNOSIS — G47.00 INSOMNIA, UNSPECIFIED TYPE: ICD-10-CM

## 2024-10-01 DIAGNOSIS — G89.4 CHRONIC PAIN SYNDROME: ICD-10-CM

## 2024-10-01 DIAGNOSIS — F11.90 CHRONIC, CONTINUOUS USE OF OPIOIDS: Primary | ICD-10-CM

## 2024-10-01 PROCEDURE — 91320 SARSCV2 VAC 30MCG TRS-SUC IM: CPT

## 2024-10-01 PROCEDURE — 90480 ADMN SARSCOV2 VAC 1/ONLY CMP: CPT

## 2024-10-01 PROCEDURE — 80306 DRUG TEST PRSMV INSTRMNT: CPT

## 2024-10-01 PROCEDURE — 99214 OFFICE O/P EST MOD 30 MIN: CPT | Mod: 25

## 2024-10-01 PROCEDURE — G0008 ADMIN INFLUENZA VIRUS VAC: HCPCS

## 2024-10-01 PROCEDURE — 90662 IIV NO PRSV INCREASED AG IM: CPT

## 2024-10-01 RX ORDER — OXYCODONE HYDROCHLORIDE 5 MG/1
5 TABLET ORAL EVERY 4 HOURS PRN
Qty: 150 TABLET | Refills: 0 | Status: SHIPPED | OUTPATIENT
Start: 2024-10-01

## 2024-10-01 RX ORDER — ZOLPIDEM TARTRATE 10 MG/1
10 TABLET ORAL
Qty: 90 TABLET | Refills: 1 | Status: SHIPPED | OUTPATIENT
Start: 2024-10-01

## 2024-10-01 ASSESSMENT — ANXIETY QUESTIONNAIRES
2. NOT BEING ABLE TO STOP OR CONTROL WORRYING: NOT AT ALL
5. BEING SO RESTLESS THAT IT IS HARD TO SIT STILL: NOT AT ALL
3. WORRYING TOO MUCH ABOUT DIFFERENT THINGS: NOT AT ALL
GAD7 TOTAL SCORE: 0
6. BECOMING EASILY ANNOYED OR IRRITABLE: NOT AT ALL
4. TROUBLE RELAXING: NOT AT ALL
GAD7 TOTAL SCORE: 0
GAD7 TOTAL SCORE: INCOMPLETE
1. FEELING NERVOUS, ANXIOUS, OR ON EDGE: NOT AT ALL
7. FEELING AFRAID AS IF SOMETHING AWFUL MIGHT HAPPEN: NOT AT ALL

## 2024-10-01 ASSESSMENT — PATIENT HEALTH QUESTIONNAIRE - PHQ9: SUM OF ALL RESPONSES TO PHQ QUESTIONS 1-9: 0

## 2024-10-01 NOTE — PROGRESS NOTES
"  Assessment & Plan     Complex regional pain syndrome type 1 of left lower extremity  Chronic pain syndrome  Chronic, continuous use of opioids  For years has been stable on oxy 5 mg, takes up to 5 tabs daily. No noted side effects. No dose changes.  and UDS appropriate. Patient follows with PCP for this q3mo. Will need updated controlled substance agreement signed at next visit.  - Urine Drug Screen Clinic; Future  - U[facility] Drug Screen Clinic  - oxyCODONE (ROXICODONE) 5 MG tablet; Take 1 tablet (5 mg) by mouth every 4 hours as needed for pain or moderate to severe pain. . Must last 30 days.  - oxyCODONE (ROXICODONE) 5 MG tablet; Take 1 tablet (5 mg) by mouth every 4 hours as needed for pain.  - oxyCODONE (ROXICODONE) 5 MG tablet; Take 1 tablet (5 mg) by mouth every 4 hours as needed for pain.      Insomnia, unspecified type  Refill.  - zolpidem (AMBIEN) 10 MG tablet; Take 1 tablet (10 mg) by mouth nightly as needed for sleep.    Return in about 3 months (around 1/1/2025) for Follow up.    Ashley Stephen is a 68 year old, presenting for the following health issues:  Clinic Care Coordination - Follow-up (Oxycodone refill. Flu and covid vaccines )        10/1/2024    11:14 AM   Additional Questions   Roomed by Viktoria   Accompanied by self         10/1/2024   Declines Weight   Did patient decline having their weight taken? Yes          10/1/2024    Information    services provided? No        HPI   Left leg chronic pain - CRPS  No recent changes  Pain control: tolerable with oxycodone  5 tablets of 5 mg oxycodone daily  No notable adverse effects    Had really bad plantar fasciitis when she was a   Ended up with muscle that was so tight in calf, and a doctor did a procedure to \"cut her calf muscle\". After this surgery, her foot felt like it was burning and she has had pain ever since  Had been going to pain clinic in [place] but then eventually switched to Dr. Mcelroy.    Water " "aerobics MWF which helps with pain        12/26/2022    11:26 AM 2/6/2024    10:08 AM 10/1/2024    12:56 PM   PHQ-9 SCORE   PHQ-9 Total Score MyChart  0    PHQ-9 Total Score 2 0 0         2/6/2024    10:19 AM 7/9/2024    10:23 AM 10/1/2024    11:19 AM   PEG Score   PEG Total Score 2 4.67 5       Chronic Pain Follow Up:  Adherence:     - Do you ever take more pain medicine than prescribed? No    - When did you take your last dose of pain medicine?  This morning   Adverse effects: No   PDMP Review         Value Time User    State PDMP site checked  Yes 10/1/2024 11:58 AM Kamron Tapia MD          Last CSA Agreement:   CSA -- Patient Level:     [Media Unavailable] Controlled Substance Agreement - Opioid - Scan on 11/17/2023 10:05 AM   [Media Unavailable] Controlled Substance Agreement - Opioid - Scan on 11/10/2022  8:48 AM   [Media Unavailable] Controlled Substance Agreement - Opioid - Scan on 10/19/2022  2:55 PM   [Media Unavailable] Controlled Substance Agreement - Opioid - Scan on 6/4/2021 12:34 PM       Last UDS: 10/1/2024      8/14/2017     2:00 PM   OPIOID RISK TOOL TOTAL SCORE   Total Score 3     Low Risk (0-3)  Moderate Risk (4-7)  High Risk (>8)      Objective    /84   Pulse 86   Temp 98.1  F (36.7  C) (Oral)   Resp 16   Ht 1.626 m (5' 4\")   SpO2 94%   BMI 32.61 kg/m    Body mass index is 32.61 kg/m .  Physical Exam  Vitals reviewed.   Constitutional:       Appearance: Normal appearance.   HENT:      Head: Normocephalic and atraumatic.   Eyes:      General: No scleral icterus.     Conjunctiva/sclera: Conjunctivae normal.   Cardiovascular:      Rate and Rhythm: Normal rate.   Pulmonary:      Effort: Pulmonary effort is normal.   Skin:     General: Skin is warm and dry.   Neurological:      General: No focal deficit present.      Mental Status: She is alert.            "

## 2024-10-01 NOTE — PROGRESS NOTES
"  {PROVIDER CHARTING PREFERENCE:539691}    Ashley Stephen is a 68 year old, presenting for the following health issues:  Clinic Care Coordination - Follow-up (Oxycodone refill. Flu and covid vaccines )      10/1/2024    11:14 AM   Additional Questions   Roomed by Viktoria   Accompanied by self         10/1/2024   Declines Weight   Did patient decline having their weight taken? Yes          10/1/2024    Information    services provided? No        HPI   {Patient is 65+ yrs, please consider 4Ms documentation (Optional):115286}  {MA/LPN/RN Pre-Provider Visit Orders- hCG/UA/Strep (Optional):771825}  {SUPERLIST (Optional):077263}  {additonal problems for provider to add (Optional):771743}    {ROS Picklists (Optional):248143}      Objective    /84   Pulse 86   Temp 98.1  F (36.7  C) (Oral)   Resp 16   Ht 1.626 m (5' 4\")   SpO2 94%   BMI 32.61 kg/m    Body mass index is 32.61 kg/m .  Physical Exam   {Exam List (Optional):591978}    {Diagnostic Test Results (Optional):292724}        Signed Electronically by: Kamron Tapia MD  {Email feedback regarding this note to [email or web address]   :267444}  "

## 2024-10-01 NOTE — Clinical Note
Hey, sorry for such a late chart - working on teaching service. Just as an FYI, I talked with Rhonda and she sends oxycodone 3 month supply (similar to ADHD refills) which I did not do on the day that I saw this patient, but I did send you the other two oxy fills for approval this week, if you are able to sign those :) thank you so much!

## 2024-10-07 RX ORDER — OXYCODONE HYDROCHLORIDE 5 MG/1
5 TABLET ORAL EVERY 4 HOURS PRN
Qty: 150 TABLET | Refills: 0 | Status: SHIPPED | OUTPATIENT
Start: 2024-11-01

## 2024-10-07 RX ORDER — OXYCODONE HYDROCHLORIDE 5 MG/1
5 TABLET ORAL EVERY 4 HOURS PRN
Qty: 150 TABLET | Refills: 0 | Status: SHIPPED | OUTPATIENT
Start: 2024-12-01

## 2024-11-11 ENCOUNTER — PATIENT OUTREACH (OUTPATIENT)
Dept: CARE COORDINATION | Facility: CLINIC | Age: 69
End: 2024-11-11
Payer: COMMERCIAL

## 2024-11-25 ENCOUNTER — PATIENT OUTREACH (OUTPATIENT)
Dept: CARE COORDINATION | Facility: CLINIC | Age: 69
End: 2024-11-25
Payer: COMMERCIAL

## 2025-02-24 DIAGNOSIS — G89.4 CHRONIC PAIN SYNDROME: ICD-10-CM

## 2025-02-24 DIAGNOSIS — G90.522 COMPLEX REGIONAL PAIN SYNDROME TYPE 1 OF LEFT LOWER EXTREMITY: ICD-10-CM

## 2025-02-24 RX ORDER — OXYCODONE HYDROCHLORIDE 5 MG/1
5 TABLET ORAL EVERY 4 HOURS PRN
Qty: 150 TABLET | Refills: 0 | Status: SHIPPED | OUTPATIENT
Start: 2025-03-01

## 2025-02-24 NOTE — TELEPHONE ENCOUNTER
Pt had future script for 3/1/25 in chart. RN queued up medication and pharmacy and sending to provider.     Effie House RN  ---  PDMP reviewed, appropriate. Discontinued scripts on file and sent new script.  Hunter Marquez MD

## 2025-02-24 NOTE — TELEPHONE ENCOUNTER
[facility] phone call message- medication clarification/question:    Full Medication Name: oxyCODONE (ROXICODONE) 5 MG tablet       Question: Pt has been having issues with the online pharmacy, [facility], she has been using to get her medication for oxyCodone. The instructions stating PRN was causing the pharmacy to not send her the medication at all, and now they are saying that she does not have a fill for the month of March anymore. Pt wants to switch her pharmacy for this medication only to the [facility] in [place]. Pt has an appointment scheduled with her PCP on 04/01.    Pharmacy confirmed as    [id number] [address] AT [facility]   Yes    OK to leave a message on voice mail? Yes    Primary language: English      needed? No    Call taken on February 24, 2025 at 9:49 AM by Kelton Bazan

## 2025-03-28 ENCOUNTER — TELEPHONE (OUTPATIENT)
Dept: FAMILY MEDICINE | Facility: CLINIC | Age: 70
End: 2025-03-28

## 2025-03-28 NOTE — TELEPHONE ENCOUNTER
[facility] phone call message- general phone call:    Reason for call: The patient's granddaughter who's staying with the patient just tested positive for Covid. The patient is wondering if there's any medication she can take to prevent getting the virus. The patient is not experiencing an symptoms as of yet.    Return call needed: Yes    OK to leave a message on voice mail? Yes    Primary language: English      needed? No    Call taken on March 28, 2025 at 12:45 PM by Coni Kerr

## 2025-03-29 ENCOUNTER — TELEPHONE (OUTPATIENT)
Dept: FAMILY MEDICINE | Facility: CLINIC | Age: 70
End: 2025-03-29
Payer: COMMERCIAL

## 2025-03-29 DIAGNOSIS — U07.1 INFECTION DUE TO 2019 NOVEL CORONAVIRUS: Primary | ICD-10-CM

## 2025-03-29 NOTE — TELEPHONE ENCOUNTER
Telephone Message   3/29/2025  10:50 AM    Call returned by Carmen Gabriel MD    Patient: Hailee Mayo   Phone number-  [phone] (home)       Phone conversation with: Patient    Situation: Hailee Mayo Is a 69 year old female. This call is regarding positive home COVID-19 test 3/29/25.  Background :   Reports her daughter visited over past few days and developed COVID-19, tested positive at home. Patient then tested positive home COVID-19 antigen test. She has since developed symptoms of congestion, sore throat, fatigue. No subjective fever and has not check temperature at home. No cough. No dyspnea. Still able to take PO.    She is wondering about starting paxlovid. She also would like to make her appointment at [facility] a telephone rather than in-person visit 4/1/25 with Dr. Ellis.    Assessment:   Discussed pros and cons of starting paxlovid, including medication dose changes required while on paxlovid. After discussion of necessary 75% reduction in oxycodone dosing, patient prefers to continue oxycodone as prescribed and declines starting paxlovid.    She would prefer a telemedicine visit 4/1 but states her video has not worked on her iPad and requests telephone visit instead.    Recommendation/Plan:  -continue conservative management with rest, increased fluid intake, OTC decongestants as needed  -will NOT prescribe Paxlovid per discussion with patient as above  -message sent to  at [facility] to change appointment to telephone visit with Dr. Ellis and also requested that patient call [facility] clinic Monday 3/31 to ensure visit is telephone visit only    Advised caller to call clinic 3/31 to ensure telephone visit scheduled for 4/1    Carmen Garbiel MD

## 2025-04-01 ENCOUNTER — VIRTUAL VISIT (OUTPATIENT)
Dept: FAMILY MEDICINE | Facility: CLINIC | Age: 70
End: 2025-04-01
Payer: COMMERCIAL

## 2025-04-01 DIAGNOSIS — U07.1 INFECTION DUE TO 2019 NOVEL CORONAVIRUS: ICD-10-CM

## 2025-04-01 DIAGNOSIS — G89.4 CHRONIC PAIN SYNDROME: Primary | ICD-10-CM

## 2025-04-01 DIAGNOSIS — G90.522 COMPLEX REGIONAL PAIN SYNDROME TYPE 1 OF LEFT LOWER EXTREMITY: ICD-10-CM

## 2025-04-01 DIAGNOSIS — F11.90 CHRONIC, CONTINUOUS USE OF OPIOIDS: ICD-10-CM

## 2025-04-01 DIAGNOSIS — G47.00 INSOMNIA, UNSPECIFIED TYPE: ICD-10-CM

## 2025-04-01 RX ORDER — OXYCODONE HYDROCHLORIDE 5 MG/1
5 TABLET ORAL EVERY 4 HOURS PRN
Qty: 150 TABLET | Refills: 0 | Status: SHIPPED | OUTPATIENT
Start: 2025-04-01

## 2025-04-01 RX ORDER — OXYCODONE HYDROCHLORIDE 5 MG/1
5 TABLET ORAL EVERY 4 HOURS PRN
Qty: 150 TABLET | Refills: 0 | Status: SHIPPED | OUTPATIENT
Start: 2025-06-01

## 2025-04-01 RX ORDER — OXYCODONE HYDROCHLORIDE 5 MG/1
5 TABLET ORAL EVERY 4 HOURS PRN
Qty: 150 TABLET | Refills: 0 | Status: SHIPPED | OUTPATIENT
Start: 2025-05-01

## 2025-04-01 NOTE — PROGRESS NOTES
"Hailee is a 69 year old who is being evaluated via a billable telephone visit.      Originating Location (pt. Location): Home    Distant Location (provider location):  On-site  Telephone visit completed due to the patient did not have access to video, while the distant provider did.    Assessment & Plan     Chronic pain syndrome  Chronic, continuous use of opioids  Complex regional pain syndrome type 1 of left lower extremity  On chronic opioids for CRPS of LLE. Dose is longstanding without increases and continues to be effective as part of multimodal therapy to allow enough pain control for pt to enjoy life and function. We discussed that it is appropriate for PharmD to review her meds given high comorbidity of her controlled substances, especially as she ages. No adverse SE to prompt change in plan.   - oxyCODONE (ROXICODONE) 5 MG tablet; Take 1 tablet (5 mg) by mouth every 4 hours as needed for pain. Must last 30 days.  - oxyCODONE (ROXICODONE) 5 MG tablet; Take 1 tablet (5 mg) by mouth every 4 hours as needed for pain. Must last 30 days.  - oxyCODONE (ROXICODONE) 5 MG tablet; Take 1 tablet (5 mg) by mouth every 4 hours as needed for pain. Must last 30 days.  - continue gabapentin 1200mg TID     Insomnia, unspecified type  Continue zolpidem 10mg at bedtime for insomnia. Has not tolerated medication holidays or decreases in the past.     Infection due to 2019 novel coronavirus  Declined Paxlovid d/t need to decrease oxycodone -- see telephone encounter. Mild-moderate sx, continue supportive cares           BMI  Estimated body mass index is 32.61 kg/m  as calculated from the following:    Height as of 10/1/24: 1.626 m (5' 4\").    Weight as of 7/9/24: 86.2 kg (190 lb).         Return in about 3 months (around 7/1/2025) for Follow up, with me. Annual labs next visit.     Subjective   Hailee is a 69 year old, presenting for the following health issues:  RECHECK (3 mo follow /up - )    HPI        COVID+  Paxlovid  " "  ++++++++++++++++++++++++++++++++++++++++++  [facility]   Since last fall, has screwed up refills every time. This last time, told her that they had cancelled it. Said that she was filling a prescription from October.   Changing oxycodone to [facility] on [address]     Zolpidem effective for sleep. Has tried med holidays in the past -- will wake up within the first hour and that's it.     Pain History:  Have you seen this provider for your pain in the past? Yes   Where in your body do you have pain? L leg is worst - CRPS   Are you seeing anyone else for your pain? No        12/26/2022    11:26 AM 2/6/2024    10:08 AM 10/1/2024    12:56 PM   PHQ-9 SCORE   PHQ-9 Total Score MyChart  0    PHQ-9 Total Score 2 0 0           6/29/2023    10:44 AM 8/31/2023    10:27 AM 10/1/2024    12:56 PM   EDUAR-7 SCORE   Total Score   Incomplete   Total Score 0 0 0             10/1/2024    11:19 AM 1/2/2025    11:18 AM 4/1/2025     1:42 PM   PEG Score   PEG Total Score 5 3.33 8       Chronic Pain Follow Up:    Location of pain: L leg   Analgesia/pain control:    - Recent changes:  no.     - Overall control: Tolerable with discomfort    - Current treatments: oxycodone, gabapentin    Adherence:     - Do you ever take more pain medicine than prescribed? No     Adverse effects: No - specifically no new issues w/ balance, confusion.  \"I lose words all the time. It seems kind of normal among my friend group.\"   Not confused, doesn't forget why she walked into a room     PDMP Review         Value Time User    State PDMP site checked  Yes 4/1/2025  1:49 PM Karin Ellis DO          Last CSA Agreement:   CSA -- Patient Level:     [Media Unavailable] Controlled Substance Agreement - Opioid - Scan on 1/3/2025 10:17 AM   [Media Unavailable] Controlled Substance Agreement - Opioid - Scan on 11/17/2023 10:05 AM   [Media Unavailable] Controlled Substance Agreement - Opioid - Scan on 11/10/2022  8:48 AM   [Media " Unavailable] Controlled Substance Agreement - Opioid - Scan on 10/19/2022  2:55 PM   [Media Unavailable] Controlled Substance Agreement - Opioid - Scan on 6/4/2021 12:34 PM       Last UDS: 1/2/2025 8/14/2017     2:00 PM   OPIOID RISK TOOL TOTAL SCORE   Total Score 3     Low Risk (0-3)  Moderate Risk (4-7)  High Risk (>8)        2/6/2024    10:54 AM 2/6/2024    11:05 AM 1/2/2025    11:36 AM 1/2/2025    11:42 AM   Opioid Overdose Calculator (RIOSORD)   Substance use disorder or any kind, including alcohol or cannabis 0 0  0   Bipolar Disorder or Schizophrenia?  0 0 0 0   Stroke or other cerebrovascular disease?  0 0  0   Significant Chronic Kidney Disease? 0 0 0 0   Heart Failure?  0 0  0   Nonmalignant pancreatic disease?  0 0 0 0   Chronic Pulmonary Disease? 0 0  0   Chronic headache?  0 0  0   Does your patient take Fentanyl (transdermal or transmucosal)? 0 0  0   Does your patient take Morphone?  0 0  0   Does your patient take Methadone? 0 0  0   Does your patient take Hydromorphone? 0 0  0   Does your patient take extended release (ER) or long acting (LA) opioid? 0 0  0   Does your patient take a Benzodiazepine?  0 9  0   Does your patient take an antidepressant?  0 0  0   Is the patient's MME >100mg/day? 0 0  0   RIOSORD Total Score 0 9  0     Average probability of overdose or serious opioid-induced respiratory depression in the next six months:   Points    Risk   0-4    2%   5-7    5%   8-9    7%   10-17    15%   18-25    30%   26-41    55%   42    83%          Objective         Vitals:  No vitals were obtained today due to virtual visit.    Physical Exam   General: Alert and no distress //Respiratory: horse voice. Occasional cough. No audible wheeze or shortness of breath // Psychiatric:  Appropriate affect, tone, and pace of words            Phone call duration: 20 minutes  Signed Electronically by: Karin Ellis DO

## 2025-06-02 DIAGNOSIS — G47.00 INSOMNIA, UNSPECIFIED TYPE: ICD-10-CM

## 2025-06-02 RX ORDER — ZOLPIDEM TARTRATE 10 MG/1
10 TABLET ORAL
Qty: 90 TABLET | Refills: 1 | Status: SHIPPED | OUTPATIENT
Start: 2025-06-02

## 2025-06-02 NOTE — TELEPHONE ENCOUNTER
PDMP reviewed, provider aware of multiple controlleds and all managed thru me, has upcoming appt.

## 2025-06-02 NOTE — TELEPHONE ENCOUNTER
[facility] phone call message- patient requesting a refill:    Full Medication Name: zolpidem (AMBIEN) 10 MG tablet         Pharmacy confirmed as     [id number] - [place], [place] - [address] [place] [address] [address] AT [place] [address] [employer] [facility] - [place], [place] - [address] [place], [place] [address] [facility] [address]  Phone: [phone] Fax: [phone]    : Yes    Additional Comments: The patient states she will run out of this medication prior to her appt date.     OK to leave a message on voice mail? Yes    Primary language: English      needed? No    Call taken on June 2, 2025 at 10:35 AM by Coni Kerr

## 2025-06-25 ENCOUNTER — VIRTUAL VISIT (OUTPATIENT)
Dept: FAMILY MEDICINE | Facility: CLINIC | Age: 70
End: 2025-06-25
Payer: COMMERCIAL

## 2025-06-25 DIAGNOSIS — G89.4 CHRONIC PAIN SYNDROME: Primary | ICD-10-CM

## 2025-06-25 DIAGNOSIS — G90.522 COMPLEX REGIONAL PAIN SYNDROME TYPE 1 OF LEFT LOWER EXTREMITY: ICD-10-CM

## 2025-06-25 RX ORDER — OXYCODONE HYDROCHLORIDE 5 MG/1
5 TABLET ORAL EVERY 4 HOURS PRN
Qty: 150 TABLET | Refills: 0 | Status: SHIPPED | OUTPATIENT
Start: 2025-07-03

## 2025-06-25 NOTE — PATIENT INSTRUCTIONS
Based on our discussion, I have outlined the following instructions for you:      - Appointment with Dr. Hickey on July 30, 2025, at 10:40 AM. This is important for getting your medication refilled and for lab testing  - Keep taking your current medications, gabapentin and oxycodone, as you have been.

## 2025-06-25 NOTE — PROGRESS NOTES
"Hailee is a 69 year old who is being evaluated via a billable telephone visit.      Originating Location (pt. Location): Home    Distant Location (provider location):  On-site  Telephone visit completed due to the patient did not have access to video, while the distant provider did.    Assessment & Plan     Chronic pain syndrome:  - Filled for one month starting 7/3.  Schedule follow-up appointment with Dr. Hickey on July 30, 2025, at 10:40 AM for medication refill and further management. Did not feel comfortable refilling for 3 months since did miss the last appointment and likely needs utox as well.   -     oxyCODONE (ROXICODONE) 5 MG tablet; Take 1 tablet (5 mg) by mouth every 4 hours as needed for pain. Must last 30 days.    Complex regional pain syndrome type 1 of left lower extremity:  - Confirmed diagnosis of complex regional pain syndrome type 1 in the left lower extremity.  - Continue current medication regimen of gabapentin and oxycodone. Refilled for 7/3. PDMP reviewed today and consistent.     Consent was obtained from the patient to use an AI documentation tool in the creation of this note.          BMI  Estimated body mass index is 32.61 kg/m  as calculated from the following:    Height as of 10/1/24: 1.626 m (5' 4\").    Weight as of 7/9/24: 86.2 kg (190 lb).             Subjective   Hailee is a 69 year old, presenting for the following health issues:  Refill Request (oxycodone)        6/25/2025     5:01 PM   Additional Questions   Roomed by ohn   Accompanied by self         6/25/2025    Information    services provided? No     HPI Hailee Mayo, 69 years old, female  - Missed appointment on April 1, 2025, due to COVID-19 infection, which lasted for 2 weeks. Aware that she missed an appointment the other day with Dr. Marinelli. Got her days mixed up.    - Diagnosed with complex regional pain syndrome, experiencing left leg pain  - Currently taking gabapentin (600 mg, two pills " three times a day) and oxycodone (5 mg, five pills daily). Has a few days left and will run out on 7/3.    - Concerned about timing of medication refills and potential pharmacy closures around holidays  - Expressed fear of driving during rush hour due to effects of medication  - Participates in water aerobics on Monday, Wednesday, and Friday                  Objective           Vitals:  No vitals were obtained today due to virtual visit.    Physical Exam   General: Alert and no distress //Respiratory: No audible wheeze, cough, or shortness of breath // Psychiatric:  Appropriate affect, tone, and pace of words            Phone call duration: 10 minutes  Signed Electronically by: Elin Moreira MD

## 2025-06-30 DIAGNOSIS — B00.9 HERPES SIMPLEX VIRUS INFECTION: ICD-10-CM

## 2025-07-01 RX ORDER — VALACYCLOVIR HYDROCHLORIDE 500 MG/1
500 TABLET, FILM COATED ORAL DAILY
Qty: 90 TABLET | Refills: 3 | Status: SHIPPED | OUTPATIENT
Start: 2025-07-01

## 2025-07-01 NOTE — TELEPHONE ENCOUNTER
"Request for medication refill:    Medication Name:     valACYclovir (VALTREX) 500 MG tablet     Providers if patient needs an appointment and you are willing to give a one month supply please refill for one month and  send a MyChart using \".SMILLIMITEDREFILL\" .Or route chart to \"P SMI \" . And use the phrase \" SMIRXFOLLOWUP\"To call patient and inform of limited refill and providers request to make an appointment. (Giving one month refill in non controlled medications is strongly recommended before denial)    If refill has been denied, meaning absolutely no refills without visit, please complete the smart phrase \".SMIRXREFUSE\" and route it to the \"P SMI MED REFILLS\"  pool to inform the patient and the pharmacy.    Matthew Molina MA     "

## 2025-07-03 DIAGNOSIS — G90.522 COMPLEX REGIONAL PAIN SYNDROME TYPE 1 OF LEFT LOWER EXTREMITY: ICD-10-CM

## 2025-07-03 DIAGNOSIS — G89.4 CHRONIC PAIN SYNDROME: ICD-10-CM

## 2025-07-03 RX ORDER — OXYCODONE HYDROCHLORIDE 5 MG/1
5 TABLET ORAL EVERY 4 HOURS PRN
Qty: 150 TABLET | Refills: 0 | Status: SHIPPED | OUTPATIENT
Start: 2025-07-03

## 2025-07-03 NOTE — TELEPHONE ENCOUNTER
Patient called clinic to inform us that their pharmacist called out so they unexpectedly had to close the pharmacy. The pharmacy is closed tomorrow and patient needs to  her medication today. Requesting transfer of Oxycodone to a pharmacy that is currently open.     Marla Arroyo RN

## 2025-07-03 NOTE — TELEPHONE ENCOUNTER
Called patient to notify that medication has been transferred to the requested pharmacy .  Marla Arroyo RN

## 2025-07-20 DIAGNOSIS — E78.5 HYPERLIPIDEMIA LDL GOAL <130: ICD-10-CM

## 2025-07-21 RX ORDER — SIMVASTATIN 20 MG
20 TABLET ORAL AT BEDTIME
Qty: 90 TABLET | Refills: 3 | Status: SHIPPED | OUTPATIENT
Start: 2025-07-21

## 2025-07-21 NOTE — TELEPHONE ENCOUNTER
"Request for medication refill:    Medication Name: simvastatin (ZOCOR) 20 MG tablet     Providers if patient needs an appointment and you are willing to give a one month supply please refill for one month and  send a MyChart using \".SMILLIMITEDREFILL\" .Or route chart to \"P SMI \" . And use the phrase \" SMIRXFOLLOWUP\"To call patient and inform of limited refill and providers request to make an appointment. (Giving one month refill in non controlled medications is strongly recommended before denial)    If refill has been denied, meaning absolutely no refills without visit, please complete the smart phrase \".SMIRXREFUSE\" and route it to the \"P [facility] MED REFILLS\"  pool to inform the patient and the pharmacy.    Aneesh Francois, CMA     "

## 2025-07-30 ENCOUNTER — OFFICE VISIT (OUTPATIENT)
Dept: FAMILY MEDICINE | Facility: CLINIC | Age: 70
End: 2025-07-30
Payer: COMMERCIAL

## 2025-07-30 VITALS
HEART RATE: 64 BPM | DIASTOLIC BLOOD PRESSURE: 85 MMHG | OXYGEN SATURATION: 96 % | HEIGHT: 64 IN | RESPIRATION RATE: 18 BRPM | TEMPERATURE: 97.5 F | SYSTOLIC BLOOD PRESSURE: 126 MMHG | BODY MASS INDEX: 32.61 KG/M2

## 2025-07-30 DIAGNOSIS — G90.522 COMPLEX REGIONAL PAIN SYNDROME TYPE 1 OF LEFT LOWER EXTREMITY: ICD-10-CM

## 2025-07-30 DIAGNOSIS — Z13.6 SCREENING FOR CARDIOVASCULAR CONDITION: Primary | ICD-10-CM

## 2025-07-30 DIAGNOSIS — Z51.81 ENCOUNTER FOR THERAPEUTIC DRUG MONITORING: ICD-10-CM

## 2025-07-30 DIAGNOSIS — G89.4 CHRONIC PAIN SYNDROME: ICD-10-CM

## 2025-07-30 DIAGNOSIS — M25.429 SOFT TISSUE SWELLING OF ELBOW JOINT: ICD-10-CM

## 2025-07-30 DIAGNOSIS — Z12.31 ENCOUNTER FOR SCREENING MAMMOGRAM FOR MALIGNANT NEOPLASM OF BREAST: ICD-10-CM

## 2025-07-30 DIAGNOSIS — Z13.1 DIABETES MELLITUS SCREENING: ICD-10-CM

## 2025-07-30 DIAGNOSIS — G47.00 INSOMNIA, UNSPECIFIED TYPE: ICD-10-CM

## 2025-07-30 DIAGNOSIS — E78.5 HYPERLIPIDEMIA WITH TARGET LDL LESS THAN 130: ICD-10-CM

## 2025-07-30 LAB
EST. AVERAGE GLUCOSE BLD GHB EST-MCNC: 123 MG/DL
HBA1C MFR BLD: 5.9 % (ref 0–5.6)

## 2025-07-30 PROCEDURE — 36415 COLL VENOUS BLD VENIPUNCTURE: CPT | Performed by: STUDENT IN AN ORGANIZED HEALTH CARE EDUCATION/TRAINING PROGRAM

## 2025-07-30 PROCEDURE — 80048 BASIC METABOLIC PNL TOTAL CA: CPT | Performed by: STUDENT IN AN ORGANIZED HEALTH CARE EDUCATION/TRAINING PROGRAM

## 2025-07-30 PROCEDURE — 83036 HEMOGLOBIN GLYCOSYLATED A1C: CPT | Performed by: STUDENT IN AN ORGANIZED HEALTH CARE EDUCATION/TRAINING PROGRAM

## 2025-07-30 PROCEDURE — 80061 LIPID PANEL: CPT | Performed by: STUDENT IN AN ORGANIZED HEALTH CARE EDUCATION/TRAINING PROGRAM

## 2025-07-31 LAB
ANION GAP SERPL CALCULATED.3IONS-SCNC: 11 MMOL/L (ref 7–15)
BUN SERPL-MCNC: 17.3 MG/DL (ref 8–23)
CALCIUM SERPL-MCNC: 9.9 MG/DL (ref 8.8–10.4)
CHLORIDE SERPL-SCNC: 104 MMOL/L (ref 98–107)
CHOLEST SERPL-MCNC: 164 MG/DL
CREAT SERPL-MCNC: 0.85 MG/DL (ref 0.51–0.95)
EGFRCR SERPLBLD CKD-EPI 2021: 74 ML/MIN/1.73M2
GLUCOSE SERPL-MCNC: 102 MG/DL (ref 70–99)
HCO3 SERPL-SCNC: 28 MMOL/L (ref 22–29)
HDLC SERPL-MCNC: 44 MG/DL
LDLC SERPL CALC-MCNC: 90 MG/DL
NONHDLC SERPL-MCNC: 120 MG/DL
POTASSIUM SERPL-SCNC: 4.9 MMOL/L (ref 3.4–5.3)
SODIUM SERPL-SCNC: 143 MMOL/L (ref 135–145)
TRIGL SERPL-MCNC: 150 MG/DL

## 2025-07-31 RX ORDER — OXYCODONE HYDROCHLORIDE 5 MG/1
5 TABLET ORAL EVERY 4 HOURS PRN
Qty: 150 TABLET | Refills: 0 | Status: SHIPPED | OUTPATIENT
Start: 2025-07-31

## 2025-07-31 RX ORDER — GABAPENTIN 600 MG/1
1200 TABLET ORAL 3 TIMES DAILY
Qty: 540 TABLET | Refills: 3 | Status: SHIPPED | OUTPATIENT
Start: 2025-07-31

## 2025-07-31 RX ORDER — OXYCODONE HYDROCHLORIDE 5 MG/1
5 TABLET ORAL EVERY 4 HOURS PRN
Qty: 150 TABLET | Refills: 0 | Status: SHIPPED | OUTPATIENT
Start: 2025-08-31

## 2025-08-25 DIAGNOSIS — G47.00 INSOMNIA, UNSPECIFIED TYPE: ICD-10-CM

## 2025-08-25 RX ORDER — ZOLPIDEM TARTRATE 10 MG/1
10 TABLET ORAL
Qty: 90 TABLET | Refills: 1 | Status: SHIPPED | OUTPATIENT
Start: 2025-08-25

## (undated) DEVICE — PREP CHLORAPREP 26ML TINTED ORANGE  260815

## (undated) DEVICE — PAD CHUX UNDERPAD 23X24" 7136

## (undated) DEVICE — KIT ENDO FIRST STEP DISINFECTANT 200ML W/POUCH EP-4

## (undated) RX ORDER — SIMETHICONE 40MG/0.6ML
SUSPENSION, DROPS(FINAL DOSAGE FORM)(ML) ORAL
Status: DISPENSED
Start: 2021-06-24

## (undated) RX ORDER — FENTANYL CITRATE 50 UG/ML
INJECTION, SOLUTION INTRAMUSCULAR; INTRAVENOUS
Status: DISPENSED
Start: 2021-06-24